# Patient Record
Sex: MALE | Race: WHITE | NOT HISPANIC OR LATINO
[De-identification: names, ages, dates, MRNs, and addresses within clinical notes are randomized per-mention and may not be internally consistent; named-entity substitution may affect disease eponyms.]

---

## 2020-04-06 ENCOUNTER — TRANSCRIPTION ENCOUNTER (OUTPATIENT)
Age: 75
End: 2020-04-06

## 2020-04-10 ENCOUNTER — INPATIENT (INPATIENT)
Facility: HOSPITAL | Age: 75
LOS: 10 days | Discharge: HOME | DRG: 177 | End: 2020-04-21
Attending: INTERNAL MEDICINE | Admitting: INTERNAL MEDICINE
Payer: MEDICARE

## 2020-04-10 VITALS
HEART RATE: 100 BPM | DIASTOLIC BLOOD PRESSURE: 66 MMHG | SYSTOLIC BLOOD PRESSURE: 95 MMHG | WEIGHT: 199.96 LBS | OXYGEN SATURATION: 96 % | RESPIRATION RATE: 20 BRPM

## 2020-04-10 DIAGNOSIS — R68.89 OTHER GENERAL SYMPTOMS AND SIGNS: ICD-10-CM

## 2020-04-10 LAB
ALBUMIN SERPL ELPH-MCNC: 3.6 G/DL — SIGNIFICANT CHANGE UP (ref 3.3–5)
ALP SERPL-CCNC: 40 U/L — SIGNIFICANT CHANGE UP (ref 40–120)
ALT FLD-CCNC: 22 U/L — SIGNIFICANT CHANGE UP (ref 10–45)
ANION GAP SERPL CALC-SCNC: 17 MMOL/L — SIGNIFICANT CHANGE UP (ref 5–17)
APTT BLD: 26.7 SEC — LOW (ref 27.5–36.3)
AST SERPL-CCNC: 46 U/L — HIGH (ref 10–40)
BASE EXCESS BLDV CALC-SCNC: 0.3 MMOL/L — SIGNIFICANT CHANGE UP (ref -2–2)
BASOPHILS # BLD AUTO: 0 K/UL — SIGNIFICANT CHANGE UP (ref 0–0.2)
BASOPHILS NFR BLD AUTO: 0 % — SIGNIFICANT CHANGE UP (ref 0–2)
BILIRUB SERPL-MCNC: 0.9 MG/DL — SIGNIFICANT CHANGE UP (ref 0.2–1.2)
BUN SERPL-MCNC: 26 MG/DL — HIGH (ref 7–23)
CA-I SERPL-SCNC: 0.92 MMOL/L — LOW (ref 1.12–1.3)
CALCIUM SERPL-MCNC: 9 MG/DL — SIGNIFICANT CHANGE UP (ref 8.4–10.5)
CHLORIDE BLDV-SCNC: 97 MMOL/L — SIGNIFICANT CHANGE UP (ref 96–108)
CHLORIDE SERPL-SCNC: 93 MMOL/L — LOW (ref 96–108)
CO2 BLDV-SCNC: 25 MMOL/L — SIGNIFICANT CHANGE UP (ref 22–30)
CO2 SERPL-SCNC: 21 MMOL/L — LOW (ref 22–31)
CREAT SERPL-MCNC: 1.29 MG/DL — SIGNIFICANT CHANGE UP (ref 0.5–1.3)
CRP SERPL-MCNC: 3.95 MG/DL — HIGH (ref 0–0.4)
D DIMER BLD IA.RAPID-MCNC: 500 NG/ML DDU — HIGH
EOSINOPHIL # BLD AUTO: 0 K/UL — SIGNIFICANT CHANGE UP (ref 0–0.5)
EOSINOPHIL NFR BLD AUTO: 0 % — SIGNIFICANT CHANGE UP (ref 0–6)
FERRITIN SERPL-MCNC: 1888 NG/ML — HIGH (ref 30–400)
FIBRINOGEN PPP-MCNC: 750 MG/DL — HIGH (ref 350–510)
GAS PNL BLDV: 123 MMOL/L — LOW (ref 135–145)
GAS PNL BLDV: SIGNIFICANT CHANGE UP
GAS PNL BLDV: SIGNIFICANT CHANGE UP
GLUCOSE BLDV-MCNC: 163 MG/DL — HIGH (ref 70–99)
GLUCOSE SERPL-MCNC: 163 MG/DL — HIGH (ref 70–99)
HCO3 BLDV-SCNC: 24 MMOL/L — SIGNIFICANT CHANGE UP (ref 21–29)
HCT VFR BLD CALC: 48 % — SIGNIFICANT CHANGE UP (ref 39–50)
HCT VFR BLDA CALC: 51 % — HIGH (ref 39–50)
HGB BLD CALC-MCNC: 16.6 G/DL — SIGNIFICANT CHANGE UP (ref 13–17)
HGB BLD-MCNC: 15.8 G/DL — SIGNIFICANT CHANGE UP (ref 13–17)
HOROWITZ INDEX BLDV+IHG-RTO: SIGNIFICANT CHANGE UP
INR BLD: 1.23 RATIO — HIGH (ref 0.88–1.16)
LACTATE BLDV-MCNC: 3.1 MMOL/L — HIGH (ref 0.7–2)
LDH SERPL L TO P-CCNC: 492 U/L — HIGH (ref 50–242)
LYMPHOCYTES # BLD AUTO: 0.36 K/UL — LOW (ref 1–3.3)
LYMPHOCYTES # BLD AUTO: 6.9 % — LOW (ref 13–44)
MANUAL SMEAR VERIFICATION: SIGNIFICANT CHANGE UP
MCHC RBC-ENTMCNC: 28.8 PG — SIGNIFICANT CHANGE UP (ref 27–34)
MCHC RBC-ENTMCNC: 32.9 GM/DL — SIGNIFICANT CHANGE UP (ref 32–36)
MCV RBC AUTO: 87.4 FL — SIGNIFICANT CHANGE UP (ref 80–100)
METAMYELOCYTES # FLD: 0.9 % — HIGH (ref 0–0)
MONOCYTES # BLD AUTO: 0.27 K/UL — SIGNIFICANT CHANGE UP (ref 0–0.9)
MONOCYTES NFR BLD AUTO: 5.2 % — SIGNIFICANT CHANGE UP (ref 2–14)
NEUTROPHILS # BLD AUTO: 4.52 K/UL — SIGNIFICANT CHANGE UP (ref 1.8–7.4)
NEUTROPHILS NFR BLD AUTO: 83.6 % — HIGH (ref 43–77)
NEUTS BAND # BLD: 2.6 % — SIGNIFICANT CHANGE UP (ref 0–8)
PCO2 BLDV: 38 MMHG — SIGNIFICANT CHANGE UP (ref 35–50)
PH BLDV: 7.42 — SIGNIFICANT CHANGE UP (ref 7.35–7.45)
PLAT MORPH BLD: NORMAL — SIGNIFICANT CHANGE UP
PLATELET # BLD AUTO: 84 K/UL — LOW (ref 150–400)
PO2 BLDV: <20 MMHG — LOW (ref 25–45)
POTASSIUM BLDV-SCNC: 10.1 MMOL/L — CRITICAL HIGH (ref 3.5–5.3)
POTASSIUM SERPL-MCNC: 4.4 MMOL/L — SIGNIFICANT CHANGE UP (ref 3.5–5.3)
POTASSIUM SERPL-SCNC: 4.4 MMOL/L — SIGNIFICANT CHANGE UP (ref 3.5–5.3)
PROCALCITONIN SERPL-MCNC: 0.1 NG/ML — SIGNIFICANT CHANGE UP (ref 0.02–0.1)
PROT SERPL-MCNC: 7.2 G/DL — SIGNIFICANT CHANGE UP (ref 6–8.3)
PROTHROM AB SERPL-ACNC: 14.2 SEC — HIGH (ref 10–12.9)
RBC # BLD: 5.49 M/UL — SIGNIFICANT CHANGE UP (ref 4.2–5.8)
RBC # FLD: 14 % — SIGNIFICANT CHANGE UP (ref 10.3–14.5)
RBC BLD AUTO: SIGNIFICANT CHANGE UP
SAO2 % BLDV: 19 % — LOW (ref 67–88)
SARS-COV-2 RNA SPEC QL NAA+PROBE: DETECTED
SODIUM SERPL-SCNC: 131 MMOL/L — LOW (ref 135–145)
VARIANT LYMPHS # BLD: 0.8 % — SIGNIFICANT CHANGE UP (ref 0–6)
WBC # BLD: 5.24 K/UL — SIGNIFICANT CHANGE UP (ref 3.8–10.5)
WBC # FLD AUTO: 5.24 K/UL — SIGNIFICANT CHANGE UP (ref 3.8–10.5)

## 2020-04-10 PROCEDURE — 99221 1ST HOSP IP/OBS SF/LOW 40: CPT | Mod: CS,AI

## 2020-04-10 PROCEDURE — 71045 X-RAY EXAM CHEST 1 VIEW: CPT | Mod: 26

## 2020-04-10 PROCEDURE — 99285 EMERGENCY DEPT VISIT HI MDM: CPT

## 2020-04-10 PROCEDURE — 93010 ELECTROCARDIOGRAM REPORT: CPT

## 2020-04-10 RX ORDER — ACETAMINOPHEN 500 MG
650 TABLET ORAL ONCE
Refills: 0 | Status: COMPLETED | OUTPATIENT
Start: 2020-04-10 | End: 2020-04-10

## 2020-04-10 RX ORDER — SODIUM CHLORIDE 9 MG/ML
1000 INJECTION INTRAMUSCULAR; INTRAVENOUS; SUBCUTANEOUS
Refills: 0 | Status: DISCONTINUED | OUTPATIENT
Start: 2020-04-10 | End: 2020-04-11

## 2020-04-10 RX ORDER — ENOXAPARIN SODIUM 100 MG/ML
40 INJECTION SUBCUTANEOUS
Refills: 0 | Status: DISCONTINUED | OUTPATIENT
Start: 2020-04-10 | End: 2020-04-21

## 2020-04-10 RX ORDER — SIMVASTATIN 20 MG/1
20 TABLET, FILM COATED ORAL AT BEDTIME
Refills: 0 | Status: DISCONTINUED | OUTPATIENT
Start: 2020-04-10 | End: 2020-04-21

## 2020-04-10 RX ORDER — LISINOPRIL 2.5 MG/1
10 TABLET ORAL DAILY
Refills: 0 | Status: DISCONTINUED | OUTPATIENT
Start: 2020-04-10 | End: 2020-04-10

## 2020-04-10 RX ADMIN — SIMVASTATIN 20 MILLIGRAM(S): 20 TABLET, FILM COATED ORAL at 21:02

## 2020-04-10 RX ADMIN — ENOXAPARIN SODIUM 40 MILLIGRAM(S): 100 INJECTION SUBCUTANEOUS at 15:55

## 2020-04-10 RX ADMIN — Medication 40 MILLIGRAM(S): at 15:55

## 2020-04-10 RX ADMIN — Medication 650 MILLIGRAM(S): at 10:50

## 2020-04-10 RX ADMIN — SODIUM CHLORIDE 70 MILLILITER(S): 9 INJECTION INTRAMUSCULAR; INTRAVENOUS; SUBCUTANEOUS at 15:54

## 2020-04-10 NOTE — ED PROVIDER NOTE - PROGRESS NOTE DETAILS
Pt with +SOB at rest. +tachypnea. 02 sat 90% on room air, 96% on 3L 02.  Pt states he feels more comfortable with the oxygen. discussed with Dr. Juraes, will admit pt. -Lizz Vizcarra PA-C

## 2020-04-10 NOTE — H&P ADULT - NSHPPHYSICALEXAM_GEN_ALL_CORE
PHYSICAL EXAMINATION:  Vital Signs Last 24 Hrs  T(C): 37.3 (10 Apr 2020 10:49), Max: 37.3 (10 Apr 2020 10:49)  T(F): 99.1 (10 Apr 2020 10:49), Max: 99.1 (10 Apr 2020 10:49)  HR: 100 (10 Apr 2020 10:49) (100 - 100)  BP: 125/81 (10 Apr 2020 10:49) (95/66 - 125/81)  BP(mean): --  RR: 34 (10 Apr 2020 10:49) (20 - 34)  SpO2: 90% (10 Apr 2020 10:49) (90% - 96%)  CAPILLARY BLOOD GLUCOSE            GENERAL: NAD, well-groomed,  HEAD:  atraumatic, normocephalic  EYES: sclera anicteric  ENMT: mucous membranes moist  NECK: supple, No JVD  CHEST/LUNG: clear to auscultation bilaterally;    no      rales   ,   no rhonchi,   HEART: normal S1, S2  ABDOMEN: BS+, soft, ND, NT   EXTREMITIES:    no    edema    b/l LEs  NEURO: awake, ,     moves all extremities  SKIN: no     rash

## 2020-04-10 NOTE — H&P ADULT - NSHPREVIEWOFSYSTEMS_GEN_ALL_CORE
REVIEW OF SYSTEMS:  GEN: no fever,    no chills  RESP:   SOB,   no cough  CVS: no chest pain,   no palpitations  GI: no abdominal pain,   no nausea,   no vomiting,   no constipation,   no diarrhea  : no dysuria,   no frequency  NEURO: no headache,   no dizziness  PSYCH: no depression,   not anxious  Derm : no rash

## 2020-04-10 NOTE — ED PROVIDER NOTE - ATTENDING CONTRIBUTION TO CARE
73 yo male with a pmh of HLD, HTN, c/o sob worse with exertion, fevers, chills, for the past 4-5 days or now with sats 91% on RA, tachypneic, rales at bases, cxr, labs, ambulatory sat likely admitted. covid order set used. supportive o2 started.

## 2020-04-10 NOTE — H&P ADULT - HISTORY OF PRESENT ILLNESS
73 yo male      with a pmh of   HLD,  HTN,        c/o sob worse with exertion, fevers, chills for 5 days.     denies chest pain.     Pt states he is unable to do minimal activity without getting sob. denies any sick contacts.     quit smoking 15 years ago.

## 2020-04-10 NOTE — ED ADULT NURSE NOTE - OBJECTIVE STATEMENT
Pt is an ambulatory 74 yr old male a/o X 3 c/o shortness of breath for 4-5 days.  Pt lives alone and EMS states as per family he has been disoriented and SOB with fevers.  Pt is A/oX2, perrl wnl, almazan with equal strength.  LUNGS CTA no chest pain or sob.  NSR on cm at 99 bpm.  No chest pain or sob at rest.  No fevers, c/o chills and N/V.  Abdomen NT ND.  No urinary symptoms.  Peripheral pulses +2bl no edema

## 2020-04-10 NOTE — H&P ADULT - ASSESSMENT
75 yo male      with a pmh of   HLD,  HTN,  ex   smoker,  quit  15  yrs          c/o sob worse with exertion, fevers, chills for 5 days. /  IN  eR ,mild  tachycardia.  hypoxia  to 90, on oxygen    no  cp     Pt states he is unable to do minimal activity without getting sob. denies any sick contacts.   pt with normal  wbc/ mild  hyponatremia.  suspect pt has  COVID  Covid, pending   pt  has  elevated  D dimer/ PTT/  LDH/ fibrinogen  ID eval   cxr/ with opacities.  b/l   on lovenox , bid  keep  O2  sat above  93       < from: Xray Chest 1 View-PORTABLE IMMEDIATE (04.10.20 @ 11:17) >  Impression: Vague patchy airspace opacities in the peripheral aspects of the bilateral lungs, concerning for viral pneumonia such as COVID.   < end of copied text > 73 yo male      with a pmh of   HLD,  HTN,  ex   smoker,  quit  15  yrs          c/o sob worse with exertion, fevers, chills for 5 days. /  IN  eR ,mild  tachycardia.  hypoxia  to 90, on oxygen    no  cp     Pt states he is unable to do minimal activity without getting sob. denies any sick contacts.   pt with normal  wbc/ mild  hyponatremia.  suspect pt has  COVID  Covid, pending   pt  has  elevated  D dimer/ PTT/  LDH/ fibrinogen  ID eval   cxr/ with opacities.  b/l   on lovenox , bid/  Wt is  90 kg  keep  O2  sat above  93       < from: Xray Chest 1 View-PORTABLE IMMEDIATE (04.10.20 @ 11:17) >  Impression: Vague patchy airspace opacities in the peripheral aspects of the bilateral lungs, concerning for viral pneumonia such as COVID.   < end of copied text > 73 yo male      with a pmh of   HLD,  HTN,  ex   smoker,  quit  15  yrs          c/o sob worse with exertion, fevers, chills for 5 days. /  IN  eR ,mild  tachycardia.,   hypoxia  to 90, on oxygen   /  sb[p   of 120 no  cp     Pt states he is unable to do minimal activity without getting sob. denies any sick contacts.   pt with normal  wbc/ mild  hyponatremia.  suspect pt has  COVID  Covid, pending   pt  has  elevated  D dimer/ PTT/  LDH/ fibrinogen  ID eval dr Casey   cxr/ with opacities.  b/l   on lovenox , bid/  Wt is  90 kg  keep  O2  sat above  93      sbp  now  in 88  to  90/  stop  lisinopril/  ov fluids       < from: Xray Chest 1 View-PORTABLE IMMEDIATE (04.10.20 @ 11:17) >  Impression: Vague patchy airspace opacities in the peripheral aspects of the bilateral lungs, concerning for viral pneumonia such as COVID.   < end of copied text >

## 2020-04-10 NOTE — H&P ADULT - NSHPLABSRESULTS_GEN_ALL_CORE
LABS:                        15.8   5.24  )-----------( 84       ( 10 Apr 2020 10:57 )             48.0     04-10    131<L>  |  93<L>  |  26<H>  ----------------------------<  163<H>  4.4   |  21<L>  |  1.29    Ca    9.0      10 Apr 2020 10:57    TPro  7.2  /  Alb  3.6  /  TBili  0.9  /  DBili  x   /  AST  46<H>  /  ALT  22  /  AlkPhos  40  04-10    PT/INR - ( 10 Apr 2020 10:57 )   PT: 14.2 sec;   INR: 1.23 ratio         PTT - ( 10 Apr 2020 10:57 )  PTT:26.7 sec            04-10 @ 10:57  10.1  <20

## 2020-04-10 NOTE — ED PROVIDER NOTE - OBJECTIVE STATEMENT
75 yo male with a pmh of HLD, HTN, c/o sob worse with exertion, fevers, chills,     quit smoking 15 years ago 75 yo male with a pmh of HLD, HTN, c/o sob worse with exertion, fevers, chills for 5 days.  denies chest pain. Pt states he is unable to do minimal activity without getting sob. denies any sick contacts.  quit smoking 15 years ago. 73 yo male with a pmh of HLD, HTN, c/o sob worse with exertion, fevers, chills for 5 days.  denies chest pain. Pt states he is unable to do minimal activity without getting sob. denies any sick contacts.  quit smoking 15 years ago.    PMD: Gonzalo Martin 73 yo male with a pmh of HLD, HTN, c/o sob worse with exertion, fevers, chills for 5 days.  denies chest pain. Pt states he is unable to do minimal activity without getting sob. denies any sick contacts.  quit smoking 15 years ago.     PMD: Gonzalo Martin

## 2020-04-10 NOTE — ED PROVIDER NOTE - DISPOSITION TYPE
NSVT   keep electrolytes in normal range  no evidence of MI  obtain echo  agree with BB    meningitis   anbx  fu with ID    HTN  cont current meds ADMIT

## 2020-04-10 NOTE — CONSULT NOTE ADULT - SUBJECTIVE AND OBJECTIVE BOX
HPI:   Patient is a 74y male who has been very weak, lost sense of taste and smell, unable to get out of bed, poor po intake and cough starting about 1 week ago. He does not know if he has fever. He has been very shakey on his feet. No n,v,d,ha,abdo pain, visual change, sore throat. No recent travel, lives alone, does not know of sick contacts. He has pain in the second left toe    REVIEW OF SYSTEMS:  All other review of systems negative (Comprehensive ROS)    PAST MEDICAL & SURGICAL HISTORY:  Hyperlipidemia  Hypertension  No significant past surgical history      Allergies    No Known Allergies    Intolerances        Antimicrobials Day #  :    Other Medications:  enoxaparin Injectable 40 milliGRAM(s) SubCutaneous two times a day  methylPREDNISolone sodium succinate Injectable 40 milliGRAM(s) IV Push two times a day  simvastatin 20 milliGRAM(s) Oral at bedtime  sodium chloride 0.9%. 1000 milliLiter(s) IV Continuous <Continuous>      FAMILY HISTORY:  No pertinent family history in first degree relatives      SOCIAL HISTORY:  Smoking: [ ]Yes [ x]No  ETOH: [ ]Yes x[ ]No  Drug Use: [ ]Yes [ x]No   [ ] Single[ ]     x    T(F): 98.1 (04-10-20 @ 15:40), Max: 99.1 (04-10-20 @ 10:49)  HR: 72 (04-10-20 @ 15:40)  BP: 109/72 (04-10-20 @ 15:40)  RR: 20 (04-10-20 @ 15:40)  SpO2: 93% (04-10-20 @ 15:40)  Wt(kg): --    PHYSICAL EXAM:  General: alert, no acute distress, looks weak  Eyes:  anicteric, no conjunctival injection, no discharge  Oropharynx: no lesions or injection 	  Neck: supple, without adenopathy  Lungs: rales  to auscultation  Heart: regular rate and rhythm; no murmur, rubs or gallops  Abdomen: soft, nondistended, nontender, without mass or organomegaly  Skin: no lesions  Extremities: no clubbing, cyanosis, or edema. left 2nd toe with fungal nail  Neurologic: alert, oriented, moves all extremities, stutters    LAB RESULTS:                        15.8   5.24  )-----------( 84       ( 10 Apr 2020 10:57 )             48.0     04-10    131<L>  |  93<L>  |  26<H>  ----------------------------<  163<H>  4.4   |  21<L>  |  1.29    Ca    9.0      10 Apr 2020 10:57    TPro  7.2  /  Alb  3.6  /  TBili  0.9  /  DBili  x   /  AST  46<H>  /  ALT  22  /  AlkPhos  40  04-10    LIVER FUNCTIONS - ( 10 Apr 2020 10:57 )  Alb: 3.6 g/dL / Pro: 7.2 g/dL / ALK PHOS: 40 U/L / ALT: 22 U/L / AST: 46 U/L / GGT: x           Ferritin, Serum: 1888 ng/mL (04.10.20 @ 13:12)  C-Reactive Protein, Serum: 3.95 mg/dL (04.10.20 @ 13:12)  Procalcitonin, Serum: 0.10:   Lactate Dehydrogenase, Serum: 492 U/L (04.10.20 @ 10:57)  D-Dimer Assay, Quantitative: 500:         MICROBIOLOGY:  RECENT CULTURES:        RADIOLOGY REVIEWED:    < from: Xray Chest 1 View-PORTABLE IMMEDIATE (04.10.20 @ 11:17) >  EXAM:  XR CHEST PORTABLE IMMED 1V                            PROCEDURE DATE:  04/10/2020            INTERPRETATION:  Indication: Shortness of breath, cough, fever.    Technique: Single portable view of the chest.    Comparison: None.    Findings: The cardiac silhouette is normal in size. There are vague patchy airspace opacities in the peripheral aspects of the bilateral lungs, more pronounced in the left lung. There are no pleural effusions. The hilar mediastinal structures appear unremarkable.    Impression: Vague patchy airspace opacities in the peripheral aspects of the bilateral lungs, concerning for viral pneumonia such as COVID.     < end of copied text >        Impression:  patient with sob, hypoxic, elevated inflammatory parameters, cough, infiltrates, rales, very weak and shakey , suspect he has covid but await results of the pcr    Recommendations:  await pcr for covid, if positive which I suspect it will be, start plaquenil 800mg once today then 400mg daily for 4 days. His qtc is less than .5 and will need to be watched  if covid is negative, would repeat and check rvp also and will add antibiotics ctx and doxy but I am very doubtful he has anything other than covid so will not order yet  defer use of steroids to dr. Duvall  dvt prophylaxis, monitor oxygenation

## 2020-04-11 LAB
ANION GAP SERPL CALC-SCNC: 14 MMOL/L — SIGNIFICANT CHANGE UP (ref 5–17)
BUN SERPL-MCNC: 30 MG/DL — HIGH (ref 7–23)
CALCIUM SERPL-MCNC: 8.8 MG/DL — SIGNIFICANT CHANGE UP (ref 8.4–10.5)
CHLORIDE SERPL-SCNC: 99 MMOL/L — SIGNIFICANT CHANGE UP (ref 96–108)
CO2 SERPL-SCNC: 20 MMOL/L — LOW (ref 22–31)
CREAT SERPL-MCNC: 0.99 MG/DL — SIGNIFICANT CHANGE UP (ref 0.5–1.3)
GLUCOSE SERPL-MCNC: 175 MG/DL — HIGH (ref 70–99)
HCT VFR BLD CALC: 44.7 % — SIGNIFICANT CHANGE UP (ref 39–50)
HGB BLD-MCNC: 14.6 G/DL — SIGNIFICANT CHANGE UP (ref 13–17)
MCHC RBC-ENTMCNC: 28.5 PG — SIGNIFICANT CHANGE UP (ref 27–34)
MCHC RBC-ENTMCNC: 32.7 GM/DL — SIGNIFICANT CHANGE UP (ref 32–36)
MCV RBC AUTO: 87.3 FL — SIGNIFICANT CHANGE UP (ref 80–100)
NRBC # BLD: 0 /100 WBCS — SIGNIFICANT CHANGE UP (ref 0–0)
PLATELET # BLD AUTO: 100 K/UL — LOW (ref 150–400)
POTASSIUM SERPL-MCNC: 4.4 MMOL/L — SIGNIFICANT CHANGE UP (ref 3.5–5.3)
POTASSIUM SERPL-SCNC: 4.4 MMOL/L — SIGNIFICANT CHANGE UP (ref 3.5–5.3)
RBC # BLD: 5.12 M/UL — SIGNIFICANT CHANGE UP (ref 4.2–5.8)
RBC # FLD: 14.1 % — SIGNIFICANT CHANGE UP (ref 10.3–14.5)
SODIUM SERPL-SCNC: 133 MMOL/L — LOW (ref 135–145)
WBC # BLD: 5.45 K/UL — SIGNIFICANT CHANGE UP (ref 3.8–10.5)
WBC # FLD AUTO: 5.45 K/UL — SIGNIFICANT CHANGE UP (ref 3.8–10.5)

## 2020-04-11 PROCEDURE — 99232 SBSQ HOSP IP/OBS MODERATE 35: CPT | Mod: CS

## 2020-04-11 RX ORDER — HYDROXYCHLOROQUINE SULFATE 200 MG
800 TABLET ORAL EVERY 24 HOURS
Refills: 0 | Status: COMPLETED | OUTPATIENT
Start: 2020-04-11 | End: 2020-04-11

## 2020-04-11 RX ORDER — ACETAMINOPHEN 500 MG
975 TABLET ORAL ONCE
Refills: 0 | Status: COMPLETED | OUTPATIENT
Start: 2020-04-11 | End: 2020-04-11

## 2020-04-11 RX ORDER — HYDROXYCHLOROQUINE SULFATE 200 MG
400 TABLET ORAL EVERY 24 HOURS
Refills: 0 | Status: COMPLETED | OUTPATIENT
Start: 2020-04-12 | End: 2020-04-15

## 2020-04-11 RX ORDER — HYDROXYCHLOROQUINE SULFATE 200 MG
TABLET ORAL
Refills: 0 | Status: COMPLETED | OUTPATIENT
Start: 2020-04-11 | End: 2020-04-15

## 2020-04-11 RX ADMIN — SIMVASTATIN 20 MILLIGRAM(S): 20 TABLET, FILM COATED ORAL at 22:35

## 2020-04-11 RX ADMIN — ENOXAPARIN SODIUM 40 MILLIGRAM(S): 100 INJECTION SUBCUTANEOUS at 04:29

## 2020-04-11 RX ADMIN — Medication 40 MILLIGRAM(S): at 17:10

## 2020-04-11 RX ADMIN — Medication 40 MILLIGRAM(S): at 04:29

## 2020-04-11 RX ADMIN — Medication 800 MILLIGRAM(S): at 12:00

## 2020-04-11 RX ADMIN — Medication 100 MILLIGRAM(S): at 22:35

## 2020-04-11 RX ADMIN — ENOXAPARIN SODIUM 40 MILLIGRAM(S): 100 INJECTION SUBCUTANEOUS at 17:11

## 2020-04-11 RX ADMIN — Medication 975 MILLIGRAM(S): at 22:35

## 2020-04-11 NOTE — CONSULT NOTE ADULT - SUBJECTIVE AND OBJECTIVE BOX
CHIEF COMPLAINT:Patient is a 74y old  Male who presents with a chief complaint of sob (11 Apr 2020 08:48)      HPI:  75 yo male with a pmh of HLD, HTN, c/o sob worse with exertion, fevers, chills for 5 days.  denies chest pain. Pt states he is unable to do minimal activity without getting sob. denies any sick contacts.  quit smoking 15 years ago.   pt also with hx of increase cholesterol , pt was dx with COVID and started on steroid and Hydroxychloroquine  no chest pain, +NEGRON.    PAST MEDICAL & SURGICAL HISTORY:  Hyperlipidemia  Hypertension  No significant past surgical history      MEDICATIONS  (STANDING):  enoxaparin Injectable 40 milliGRAM(s) SubCutaneous two times a day  hydroxychloroquine   Oral   methylPREDNISolone sodium succinate Injectable 40 milliGRAM(s) IV Push two times a day  simvastatin 20 milliGRAM(s) Oral at bedtime  sodium chloride 0.9%. 1000 milliLiter(s) (70 mL/Hr) IV Continuous <Continuous>    MEDICATIONS  (PRN):  benzonatate 100 milliGRAM(s) Oral three times a day PRN Cough      FAMILY HISTORY:  No pertinent family history in first degree relatives      SOCIAL HISTORY:    [ ] Non-smoker  [ ] Smoker  [ ] Alcohol    Allergies    No Known Allergies    Intolerances    	    REVIEW OF SYSTEMS:  CONSTITUTIONAL: No fever, weight loss, or fatigue  EYES: No eye pain, visual disturbances, or discharge  ENT:  No difficulty hearing, tinnitus, vertigo; No sinus or throat pain  NECK: No pain or stiffness  RESPIRATORY: No cough, wheezing, chills or hemoptysis; + Shortness of Breath  CARDIOVASCULAR: No chest pain, palpitations, passing out, dizziness, or leg swelling  GASTROINTESTINAL: No abdominal or epigastric pain. No nausea, vomiting, or hematemesis; No diarrhea or constipation. No melena or hematochezia.  GENITOURINARY: No dysuria, frequency, hematuria, or incontinence  NEUROLOGICAL: No headaches, memory loss, loss of strength, numbness, or tremors  SKIN: No itching, burning, rashes, or lesions   LYMPH Nodes: No enlarged glands  ENDOCRINE: No heat or cold intolerance; No hair loss  MUSCULOSKELETAL: No joint pain or swelling; No muscle, back, or extremity pain  PSYCHIATRIC: No depression, anxiety, mood swings, or difficulty sleeping  HEME/LYMPH: No easy bruising, or bleeding gums  ALLERGY AND IMMUNOLOGIC: No hives or eczema	    [ ] All others negative	  [ ] Unable to obtain    PHYSICAL EXAM:  T(C): 36.8 (04-11-20 @ 09:16), Max: 37.3 (04-11-20 @ 01:11)  HR: 81 (04-11-20 @ 09:16) (72 - 94)  BP: 117/71 (04-11-20 @ 09:16) (100/71 - 127/77)  RR: 20 (04-11-20 @ 09:16) (20 - 20)  SpO2: 90% (04-11-20 @ 09:16) (90% - 98%)  Wt(kg): --  I&O's Summary    10 Apr 2020 07:01  -  11 Apr 2020 07:00  --------------------------------------------------------  IN: 750 mL / OUT: 0 mL / NET: 750 mL        Appearance: Normal	  HEENT:   Normal oral mucosa, PERRL, EOMI	  Lymphatic: No lymphadenopathy  Cardiovascular: Normal S1 S2, No JVD, + murmurs, No edema  Respiratory: Lungs clear to auscultation	  Psychiatry: A & O x 3, Mood & affect appropriate  Gastrointestinal:  Soft, Non-tender, + BS	  Skin: No rashes, No ecchymoses, No cyanosis	  Neurologic: Non-focal  Extremities: Normal range of motion, No clubbing, cyanosis or edema  Vascular: Peripheral pulses palpable 2+ bilaterally    TELEMETRY: 	    ECG:  	  RADIOLOGY:  OTHER: 	  	  LABS:	 	    CARDIAC MARKERS:                              14.6   5.45  )-----------( 100      ( 11 Apr 2020 07:08 )             44.7     04-11    133<L>  |  99  |  30<H>  ----------------------------<  175<H>  4.4   |  20<L>  |  0.99    Ca    8.8      11 Apr 2020 07:08    TPro  7.2  /  Alb  3.6  /  TBili  0.9  /  DBili  x   /  AST  46<H>  /  ALT  22  /  AlkPhos  40  04-10    proBNP:   Lipid Profile:   HgA1c:   TSH:   PT/INR - ( 10 Apr 2020 10:57 )   PT: 14.2 sec;   INR: 1.23 ratio         PTT - ( 10 Apr 2020 10:57 )  PTT:26.7 sec    PREVIOUS DIAGNOSTIC TESTING:    < from: 12 Lead ECG (04.10.20 @ 11:58) >  Ventricular Rate 93 BPM    Atrial Rate 93 BPM    P-R Interval 164 ms    QRS Duration 80 ms    Q-T Interval 386 ms    QTC Calculation(Bezet) 479 ms    P Axis 59 degrees    R Axis -62 degrees    T Axis 55 degrees    Diagnosis Line SINUS RHYTHM WITH PREMATURE ATRIAL COMPLEXES  POSSIBLE LEFT ATRIAL ENLARGEMENT  LEFT AXIS DEVIATION  ABNORMAL ECG    < from: Xray Chest 1 View-PORTABLE IMMEDIATE (04.10.20 @ 11:17) >  Findings: The cardiac silhouette is normal in size. There are vague patchy airspace opacities in the peripheral aspects of the bilateral lungs, more pronounced in the left lung. There are no pleural effusions. The hilar mediastinal structures appear unremarkable.    Impression: Vague patchy airspace opacities in the peripheral aspects of the bilateral lungs, concerning for viral pneumonia such as COVID.         await pcr for covid, if positive which I suspect it will be, start plaquenil 800mg once today then 400mg daily for 4 days. His qtc is less than .5 and will need to be watched  if covid is negative, would repeat and check rvp also and will add antibiotics ctx and doxy but I am very doubtful he has anything other than covid so will not order yet  defer use of steroids to dr. Duvall  dvt prophylaxis, monitor oxygenation

## 2020-04-11 NOTE — PROGRESS NOTE ADULT - ASSESSMENT
73 yo male      with a pmh of   HLD,  HTN,  ex   smoker,  quit  15  yrs          c/o sob worse with exertion, fevers, chills for 5 days. /  IN  eR ,mild  tachycardia.,   hypoxia  to 90, on oxygen   /  sb[p   of 120 no  cp     Pt states he is unable to do minimal activity without getting sob. denies any sick contacts.   pt with normal  wbc/ mild  hyponatremia.  suspect pt has  COVID  Covid, Positive, on PLAQUENIL,  IV STEROID   pt  has  elevated  D dimer/ PTT/  LDH/ fibrinogen  ID eval dr Casey   cxr/ with opacities.  b/l   on lovenox , bid/  Wt is  90 kg  keep  O2  sat above  93  bp  meds  stopped/  sbp  has improved       < from: Xray Chest 1 View-PORTABLE IMMEDIATE (04.10.20 @ 11:17) >  Impression: Vague patchy airspace opacities in the peripheral aspects of the bilateral lungs, concerning for viral pneumonia such as COVID.   < end of copied text >

## 2020-04-11 NOTE — CONSULT NOTE ADULT - ASSESSMENT
75 yo male with a pmh of HLD, HTN, c/o sob worse with exertion, fevers, chills for 5 days.  denies chest pain. Pt states he is unable to do minimal activity without getting sob. denies any sick contacts.  quit smoking 15 years ago.   pt also with hx of increase cholesterol , pt was dx with COVID and started on steroid and Hydroxychloroquine  no chest pain, +NEGRON.  pt with covid + agree with steroid and Hydroxychloroquine  will repeat ecg today qtc 479 pruior to initiation  hold all bp meds  dc ivf  agree with change Lovenox to BID  will decide on anakinra if worsened  continue Lovenox 40 mg bid  check and fu markers

## 2020-04-11 NOTE — PROGRESS NOTE ADULT - SUBJECTIVE AND OBJECTIVE BOX
weak/  mils  tachypnea    REVIEW OF SYSTEMS:  GEN: no fever,    no chills  RESP: no SOB,   no cough  CVS: no chest pain,   no palpitations  GI: no abdominal pain,   no nausea,   no vomiting,   no constipation,   no diarrhea  : no dysuria,   no frequency  NEURO: no headache,   no dizziness  PSYCH: no depression,   not anxious  Derm : no rash    MEDICATIONS  (STANDING):  enoxaparin Injectable 40 milliGRAM(s) SubCutaneous two times a day  hydroxychloroquine 800 milliGRAM(s) Oral every 24 hours  hydroxychloroquine   Oral   methylPREDNISolone sodium succinate Injectable 40 milliGRAM(s) IV Push two times a day  simvastatin 20 milliGRAM(s) Oral at bedtime  sodium chloride 0.9%. 1000 milliLiter(s) (70 mL/Hr) IV Continuous <Continuous>    MEDICATIONS  (PRN):      Vital Signs Last 24 Hrs  T(C): 36.7 (11 Apr 2020 04:32), Max: 37.3 (10 Apr 2020 10:49)  T(F): 98 (11 Apr 2020 04:32), Max: 99.2 (11 Apr 2020 01:11)  HR: 80 (11 Apr 2020 04:32) (72 - 100)  BP: 120/76 (11 Apr 2020 04:32) (95/66 - 127/77)  BP(mean): --  RR: 20 (11 Apr 2020 04:32) (20 - 34)  SpO2: 92% (11 Apr 2020 04:32) (90% - 98%)  CAPILLARY BLOOD GLUCOSE        I&O's Summary    10 Apr 2020 07:01  -  11 Apr 2020 07:00  --------------------------------------------------------  IN: 750 mL / OUT: 0 mL / NET: 750 mL        PHYSICAL EXAM:  HEAD:  Atraumatic, Normocephalic  NECK: Supple, No   JVD  CHEST/LUNG:   no     rales,     no,    rhonchi  HEART: Regular rate and rhythm;         murmur  ABDOMEN: Soft, Nontender, ;   EXTREMITIES:    no    edema  NEUROLOGY:  alert    LABS:                        14.6   5.45  )-----------( 100      ( 11 Apr 2020 07:08 )             44.7     04-11    133<L>  |  99  |  30<H>  ----------------------------<  175<H>  4.4   |  20<L>  |  0.99    Ca    8.8      11 Apr 2020 07:08    TPro  7.2  /  Alb  3.6  /  TBili  0.9  /  DBili  x   /  AST  46<H>  /  ALT  22  /  AlkPhos  40  04-10    PT/INR - ( 10 Apr 2020 10:57 )   PT: 14.2 sec;   INR: 1.23 ratio         PTT - ( 10 Apr 2020 10:57 )  PTT:26.7 sec            04-10 @ 10:57  10.1  <20              Consultant(s) Notes Reviewed:      Care Discussed with Consultants/Other Providers:

## 2020-04-12 PROCEDURE — 99232 SBSQ HOSP IP/OBS MODERATE 35: CPT | Mod: CS

## 2020-04-12 RX ORDER — LANOLIN ALCOHOL/MO/W.PET/CERES
3 CREAM (GRAM) TOPICAL ONCE
Refills: 0 | Status: COMPLETED | OUTPATIENT
Start: 2020-04-12 | End: 2020-04-12

## 2020-04-12 RX ADMIN — Medication 40 MILLIGRAM(S): at 17:49

## 2020-04-12 RX ADMIN — Medication 400 MILLIGRAM(S): at 09:02

## 2020-04-12 RX ADMIN — Medication 100 MILLIGRAM(S): at 05:16

## 2020-04-12 RX ADMIN — ENOXAPARIN SODIUM 40 MILLIGRAM(S): 100 INJECTION SUBCUTANEOUS at 17:49

## 2020-04-12 RX ADMIN — ENOXAPARIN SODIUM 40 MILLIGRAM(S): 100 INJECTION SUBCUTANEOUS at 05:16

## 2020-04-12 RX ADMIN — SIMVASTATIN 20 MILLIGRAM(S): 20 TABLET, FILM COATED ORAL at 21:36

## 2020-04-12 RX ADMIN — Medication 100 MILLIGRAM(S): at 21:36

## 2020-04-12 RX ADMIN — Medication 3 MILLIGRAM(S): at 21:36

## 2020-04-12 RX ADMIN — Medication 40 MILLIGRAM(S): at 05:16

## 2020-04-12 NOTE — PROGRESS NOTE ADULT - SUBJECTIVE AND OBJECTIVE BOX
CC: f/u for covid    Patient reports feels better    REVIEW OF SYSTEMS:  All other review of systems negative (Comprehensive ROS)    Antimicrobials Day #  :3/5  hydroxychloroquine   Oral   hydroxychloroquine 400 milliGRAM(s) Oral every 24 hours    Other Medications Reviewed    T(F): 97.6 (04-12-20 @ 21:05), Max: 98 (04-12-20 @ 04:40)  HR: 76 (04-12-20 @ 21:05)  BP: 137/86 (04-12-20 @ 21:05)  RR: 20 (04-12-20 @ 21:05)  SpO2: 89% (04-12-20 @ 21:05)  Wt(kg): --    PHYSICAL EXAM:  General: alert, no acute distress  Eyes:  anicteric, no conjunctival injection, no discharge  Oropharynx: no lesions or injection 	  Neck: supple, without adenopathy  Lungs: rales to auscultation  Heart: regular rate and rhythm; no murmur, rubs or gallops  Abdomen: soft, nondistended, nontender, without mass or organomegaly  Skin: no lesions  Extremities: no clubbing, cyanosis, or edema  Neurologic: alert, stronger  moves all extremities    LAB RESULTS:                        14.6   5.45  )-----------( 100      ( 11 Apr 2020 07:08 )             44.7     04-11    133<L>  |  99  |  30<H>  ----------------------------<  175<H>  4.4   |  20<L>  |  0.99    Ca    8.8      11 Apr 2020 07:08      C-Reactive Protein, Serum: 3.95 mg/dL (04.10.20 @ 13:12)  Ferritin, Serum: 1888 ng/mL (04.10.20 @ 13:12)  Lactate Dehydrogenase, Serum: 492 U/L (04.10.20 @ 10:57)  D-Dimer Assay, Quantitative: 500: D-Dimer result less than 230 ng/mL DDU correlates with the absence  of thrombosis in a patient with a low and moderate       pre-test probability of thrombosis.  1 DDU is approximately equal to  2 ng/mL FEU (previous units). ng/mL DDU (04.10.20 @ 10:57)        MICROBIOLOGY:  RECENT CULTURES:      RADIOLOGY REVIEWED:        < from: Xray Chest 1 View-PORTABLE IMMEDIATE (04.10.20 @ 11:17) >    EXAM:  XR CHEST PORTABLE IMMED 1V                            PROCEDURE DATE:  04/10/2020            INTERPRETATION:  Indication: Shortness of breath, cough, fever.    Technique: Single portable view of the chest.    Comparison: None.    Findings: The cardiac silhouette is normal in size. There are vague patchy airspace opacities in the peripheral aspects of the bilateral lungs, more pronounced in the left lung. There are no pleural effusions. The hilar mediastinal structures appear unremarkable.    Impression: Vague patchy airspace opacities in the peripheral aspects of the bilateral lungs, concerning for viral pneumonia such as COVID.     < end of copied text >        Assessment:  patient t with sob, hypoxic, elevated inflammatory parameters, cough, infiltrates, rales, very weak and shakey , found to have covid, on plaquenil , feels better, requires nasal cannula  Plan:  2 more days of plaquenil  monitor inflammatory parameters  dvt prophylaxis CC: f/u for covid    Patient reports feels better    REVIEW OF SYSTEMS:  All other review of systems negative (Comprehensive ROS)    Antimicrobials Day #  :2/5  hydroxychloroquine   Oral   hydroxychloroquine 400 milliGRAM(s) Oral every 24 hours    Other Medications Reviewed    T(F): 97.6 (04-12-20 @ 21:05), Max: 98 (04-12-20 @ 04:40)  HR: 76 (04-12-20 @ 21:05)  BP: 137/86 (04-12-20 @ 21:05)  RR: 20 (04-12-20 @ 21:05)  SpO2: 89% (04-12-20 @ 21:05)  Wt(kg): --    PHYSICAL EXAM:  General: alert, no acute distress  Eyes:  anicteric, no conjunctival injection, no discharge  Oropharynx: no lesions or injection 	  Neck: supple, without adenopathy  Lungs: rales to auscultation  Heart: regular rate and rhythm; no murmur, rubs or gallops  Abdomen: soft, nondistended, nontender, without mass or organomegaly  Skin: no lesions  Extremities: no clubbing, cyanosis, or edema  Neurologic: alert, stronger  moves all extremities    LAB RESULTS:                        14.6   5.45  )-----------( 100      ( 11 Apr 2020 07:08 )             44.7     04-11    133<L>  |  99  |  30<H>  ----------------------------<  175<H>  4.4   |  20<L>  |  0.99    Ca    8.8      11 Apr 2020 07:08      C-Reactive Protein, Serum: 3.95 mg/dL (04.10.20 @ 13:12)  Ferritin, Serum: 1888 ng/mL (04.10.20 @ 13:12)  Lactate Dehydrogenase, Serum: 492 U/L (04.10.20 @ 10:57)  D-Dimer Assay, Quantitative: 500: D-Dimer result less than 230 ng/mL DDU correlates with the absence  of thrombosis in a patient with a low and moderate       pre-test probability of thrombosis.  1 DDU is approximately equal to  2 ng/mL FEU (previous units). ng/mL DDU (04.10.20 @ 10:57)        MICROBIOLOGY:  RECENT CULTURES:      RADIOLOGY REVIEWED:        < from: Xray Chest 1 View-PORTABLE IMMEDIATE (04.10.20 @ 11:17) >    EXAM:  XR CHEST PORTABLE IMMED 1V                            PROCEDURE DATE:  04/10/2020            INTERPRETATION:  Indication: Shortness of breath, cough, fever.    Technique: Single portable view of the chest.    Comparison: None.    Findings: The cardiac silhouette is normal in size. There are vague patchy airspace opacities in the peripheral aspects of the bilateral lungs, more pronounced in the left lung. There are no pleural effusions. The hilar mediastinal structures appear unremarkable.    Impression: Vague patchy airspace opacities in the peripheral aspects of the bilateral lungs, concerning for viral pneumonia such as COVID.     < end of copied text >        Assessment:  patient t with sob, hypoxic, elevated inflammatory parameters, cough, infiltrates, rales, very weak and shakey , found to have covid, on plaquenil , feels better, requires nasal cannula  Plan:  3 more days of plaquenil  monitor inflammatory parameters  dvt prophylaxis

## 2020-04-12 NOTE — PROGRESS NOTE ADULT - SUBJECTIVE AND OBJECTIVE BOX
still  sob    REVIEW OF SYSTEMS:  GEN: no fever,    no chills  RESP: SOB,   no cough  CVS: no chest pain,   no palpitations  GI: no abdominal pain,   no nausea,   no vomiting,   no constipation,   no diarrhea  : no dysuria,   no frequency  NEURO: no headache,   no dizziness  PSYCH: no depression,   not anxious  Derm : no rash    MEDICATIONS  (STANDING):  enoxaparin Injectable 40 milliGRAM(s) SubCutaneous two times a day  hydroxychloroquine   Oral   hydroxychloroquine 400 milliGRAM(s) Oral every 24 hours  methylPREDNISolone sodium succinate Injectable 40 milliGRAM(s) IV Push two times a day  simvastatin 20 milliGRAM(s) Oral at bedtime    MEDICATIONS  (PRN):  benzonatate 100 milliGRAM(s) Oral three times a day PRN Cough      Vital Signs Last 24 Hrs  T(C): 36.7 (12 Apr 2020 04:40), Max: 36.8 (11 Apr 2020 09:16)  T(F): 98 (12 Apr 2020 04:40), Max: 98.2 (11 Apr 2020 09:16)  HR: 67 (12 Apr 2020 04:40) (67 - 81)  BP: 131/80 (12 Apr 2020 04:40) (109/67 - 137/83)  BP(mean): --  RR: 20 (12 Apr 2020 04:40) (20 - 20)  SpO2: 90% (12 Apr 2020 04:40) (88% - 90%)  CAPILLARY BLOOD GLUCOSE        I&O's Summary    11 Apr 2020 07:01  -  12 Apr 2020 07:00  --------------------------------------------------------  IN: 900 mL / OUT: 2050 mL / NET: -1150 mL        PHYSICAL EXAM:  HEAD:  Atraumatic, Normocephalic  NECK: Supple, No   JVD  CHEST/LUNG:   no     rales,     no,    rhonchi  HEART: Regular rate and rhythm;         murmur  ABDOMEN: Soft, Nontender, ;   EXTREMITIES:        edema  NEUROLOGY:  alert    LABS:                        14.6   5.45  )-----------( 100      ( 11 Apr 2020 07:08 )             44.7     04-11    133<L>  |  99  |  30<H>  ----------------------------<  175<H>  4.4   |  20<L>  |  0.99    Ca    8.8      11 Apr 2020 07:08    TPro  7.2  /  Alb  3.6  /  TBili  0.9  /  DBili  x   /  AST  46<H>  /  ALT  22  /  AlkPhos  40  04-10    PT/INR - ( 10 Apr 2020 10:57 )   PT: 14.2 sec;   INR: 1.23 ratio         PTT - ( 10 Apr 2020 10:57 )  PTT:26.7 sec            04-10 @ 10:57  10.1  <20              Consultant(s) Notes Reviewed:      Care Discussed with Consultants/Other Providers:

## 2020-04-12 NOTE — PROGRESS NOTE ADULT - SUBJECTIVE AND OBJECTIVE BOX
CARDIOLOGY     PROGRESS  NOTE   ________________________________________________    CHIEF COMPLAINT:Patient is a 74y old  Male who presents with a chief complaint of sob (12 Apr 2020 07:02)  no complain.  	  REVIEW OF SYSTEMS:  CONSTITUTIONAL: No fever, weight loss, or fatigue  EYES: No eye pain, visual disturbances, or discharge  ENT:  No difficulty hearing, tinnitus, vertigo; No sinus or throat pain  NECK: No pain or stiffness  RESPIRATORY: No cough, wheezing, chills or hemoptysis; No Shortness of Breath  CARDIOVASCULAR: No chest pain, palpitations, passing out, dizziness, or leg swelling  GASTROINTESTINAL: No abdominal or epigastric pain. No nausea, vomiting, or hematemesis; No diarrhea or constipation. No melena or hematochezia.  GENITOURINARY: No dysuria, frequency, hematuria, or incontinence  NEUROLOGICAL: No headaches, memory loss, loss of strength, numbness, or tremors  SKIN: No itching, burning, rashes, or lesions   LYMPH Nodes: No enlarged glands  ENDOCRINE: No heat or cold intolerance; No hair loss  MUSCULOSKELETAL: No joint pain or swelling; No muscle, back, or extremity pain  PSYCHIATRIC: No depression, anxiety, mood swings, or difficulty sleeping  HEME/LYMPH: No easy bruising, or bleeding gums  ALLERGY AND IMMUNOLOGIC: No hives or eczema	    [ ] All others negative	  [ ] Unable to obtain    PHYSICAL EXAM:  T(C): 36 (04-12-20 @ 08:15), Max: 36.7 (04-12-20 @ 04:40)  HR: 72 (04-12-20 @ 08:15) (67 - 79)  BP: 129/78 (04-12-20 @ 08:15) (109/67 - 137/83)  RR: 22 (04-12-20 @ 08:15) (20 - 22)  SpO2: 94% (04-12-20 @ 08:15) (88% - 94%)  Wt(kg): --  I&O's Summary    11 Apr 2020 07:01  -  12 Apr 2020 07:00  --------------------------------------------------------  IN: 900 mL / OUT: 2050 mL / NET: -1150 mL    12 Apr 2020 07:01  -  12 Apr 2020 11:04  --------------------------------------------------------  IN: 360 mL / OUT: 0 mL / NET: 360 mL        Appearance: Normal	  HEENT:   Normal oral mucosa, PERRL, EOMI	  Lymphatic: No lymphadenopathy  Cardiovascular: Normal S1 S2, No JVD, + murmurs, No edema  Respiratory: clear  Psychiatry: A & O x 3, Mood & affect appropriate  Gastrointestinal:  Soft, Non-tender, + BS	  Skin: No rashes, No ecchymoses, No cyanosis	  Neurologic: Non-focal  Extremities: Normal range of motion, No clubbing, cyanosis or edema  Vascular: Peripheral pulses palpable 2+ bilaterally    MEDICATIONS  (STANDING):  enoxaparin Injectable 40 milliGRAM(s) SubCutaneous two times a day  hydroxychloroquine   Oral   hydroxychloroquine 400 milliGRAM(s) Oral every 24 hours  methylPREDNISolone sodium succinate Injectable 40 milliGRAM(s) IV Push two times a day  simvastatin 20 milliGRAM(s) Oral at bedtime      TELEMETRY: 	    ECG:  	  RADIOLOGY:  OTHER: 	  	  LABS:	 	    CARDIAC MARKERS:                                14.6   5.45  )-----------( 100      ( 11 Apr 2020 07:08 )             44.7     04-11    133<L>  |  99  |  30<H>  ----------------------------<  175<H>  4.4   |  20<L>  |  0.99    Ca    8.8      11 Apr 2020 07:08      proBNP:   Lipid Profile:   HgA1c:   TSH:         Assessment and plan  ---------------------------  73 yo male with a pmh of HLD, HTN, c/o sob worse with exertion, fevers, chills for 5 days.  denies chest pain. Pt states he is unable to do minimal activity without getting sob. denies any sick contacts.  quit smoking 15 years ago.   pt also with hx of increase cholesterol , pt was dx with COVID and started on steroid and Hydroxychloroquine  no chest pain, +NEGRON.  pt with covid + agree with steroid and Hydroxychloroquine, doing better on o2   will add Anakinra if worsened  check markers  will repeat ecg today qtc 479 pruior to initiation  hold all bp meds  dc ivf  agree with change Lovenox to BID  will decide on anakinra if worsened  continue Lovenox 40 mg bid  check and fu markers

## 2020-04-12 NOTE — PROGRESS NOTE ADULT - ASSESSMENT
73 yo male      with a pmh of   HLD,  HTN,  ex   smoker,  quit  15  yrs          c/o sob worse with exertion, fevers, chills for 5 days. /  IN  eR ,mild  tachycardia.,   hypoxia  to 90, on oxygen   /  sb[p   of 120 no  cp     Pt states he is unable to do minimal activity without getting sob. denies any sick contacts.   pt with normal  wbc/ mild  hyponatremia.    Covid, Positive, on PLAQUENIL,  IV STEROID   pt  has  elevated  D dimer/ PTT/  LDH/ fibrinogen  ID eval dr Casey   cxr/ with opacities.  b/l   on lovenox , bid/  Wt is  90 kg  keep  O2  sat above  93  bp  meds  stopped/  sbp  has improved  continue  supportive care       < from: Xray Chest 1 View-PORTABLE IMMEDIATE (04.10.20 @ 11:17) >  Impression: Vague patchy airspace opacities in the peripheral aspects of the bilateral lungs, concerning for viral pneumonia such as COVID.   < end of copied text >

## 2020-04-13 LAB
ALBUMIN SERPL ELPH-MCNC: 3.7 G/DL — SIGNIFICANT CHANGE UP (ref 3.3–5)
ALP SERPL-CCNC: 48 U/L — SIGNIFICANT CHANGE UP (ref 40–120)
ALT FLD-CCNC: 23 U/L — SIGNIFICANT CHANGE UP (ref 10–45)
ANION GAP SERPL CALC-SCNC: 17 MMOL/L — SIGNIFICANT CHANGE UP (ref 5–17)
AST SERPL-CCNC: 41 U/L — HIGH (ref 10–40)
BILIRUB SERPL-MCNC: 0.7 MG/DL — SIGNIFICANT CHANGE UP (ref 0.2–1.2)
BUN SERPL-MCNC: 35 MG/DL — HIGH (ref 7–23)
CALCIUM SERPL-MCNC: 9.3 MG/DL — SIGNIFICANT CHANGE UP (ref 8.4–10.5)
CHLORIDE SERPL-SCNC: 98 MMOL/L — SIGNIFICANT CHANGE UP (ref 96–108)
CO2 SERPL-SCNC: 21 MMOL/L — LOW (ref 22–31)
CREAT SERPL-MCNC: 1.06 MG/DL — SIGNIFICANT CHANGE UP (ref 0.5–1.3)
CRP SERPL-MCNC: 0.79 MG/DL — HIGH (ref 0–0.4)
D DIMER BLD IA.RAPID-MCNC: 255 NG/ML DDU — HIGH
FERRITIN SERPL-MCNC: 1268 NG/ML — HIGH (ref 30–400)
GLUCOSE SERPL-MCNC: 154 MG/DL — HIGH (ref 70–99)
HCT VFR BLD CALC: 48.5 % — SIGNIFICANT CHANGE UP (ref 39–50)
HGB BLD-MCNC: 15.3 G/DL — SIGNIFICANT CHANGE UP (ref 13–17)
LDH SERPL L TO P-CCNC: 498 U/L — HIGH (ref 50–242)
MCHC RBC-ENTMCNC: 28.1 PG — SIGNIFICANT CHANGE UP (ref 27–34)
MCHC RBC-ENTMCNC: 31.5 GM/DL — LOW (ref 32–36)
MCV RBC AUTO: 89.2 FL — SIGNIFICANT CHANGE UP (ref 80–100)
NRBC # BLD: 0 /100 WBCS — SIGNIFICANT CHANGE UP (ref 0–0)
PLATELET # BLD AUTO: 163 K/UL — SIGNIFICANT CHANGE UP (ref 150–400)
POTASSIUM SERPL-MCNC: 4.5 MMOL/L — SIGNIFICANT CHANGE UP (ref 3.5–5.3)
POTASSIUM SERPL-SCNC: 4.5 MMOL/L — SIGNIFICANT CHANGE UP (ref 3.5–5.3)
PROCALCITONIN SERPL-MCNC: 0.05 NG/ML — SIGNIFICANT CHANGE UP (ref 0.02–0.1)
PROT SERPL-MCNC: 7.4 G/DL — SIGNIFICANT CHANGE UP (ref 6–8.3)
RBC # BLD: 5.44 M/UL — SIGNIFICANT CHANGE UP (ref 4.2–5.8)
RBC # FLD: 14.1 % — SIGNIFICANT CHANGE UP (ref 10.3–14.5)
SODIUM SERPL-SCNC: 136 MMOL/L — SIGNIFICANT CHANGE UP (ref 135–145)
WBC # BLD: 8.76 K/UL — SIGNIFICANT CHANGE UP (ref 3.8–10.5)
WBC # FLD AUTO: 8.76 K/UL — SIGNIFICANT CHANGE UP (ref 3.8–10.5)

## 2020-04-13 PROCEDURE — 99232 SBSQ HOSP IP/OBS MODERATE 35: CPT | Mod: CS

## 2020-04-13 RX ADMIN — Medication 100 MILLIGRAM(S): at 04:03

## 2020-04-13 RX ADMIN — SIMVASTATIN 20 MILLIGRAM(S): 20 TABLET, FILM COATED ORAL at 22:52

## 2020-04-13 RX ADMIN — ENOXAPARIN SODIUM 40 MILLIGRAM(S): 100 INJECTION SUBCUTANEOUS at 16:57

## 2020-04-13 RX ADMIN — Medication 40 MILLIGRAM(S): at 04:03

## 2020-04-13 RX ADMIN — Medication 400 MILLIGRAM(S): at 08:05

## 2020-04-13 RX ADMIN — ENOXAPARIN SODIUM 40 MILLIGRAM(S): 100 INJECTION SUBCUTANEOUS at 04:02

## 2020-04-13 NOTE — PROGRESS NOTE ADULT - SUBJECTIVE AND OBJECTIVE BOX
CC: f/u for COVID-19     Patient reports that he continues to have a continues cough     REVIEW OF SYSTEMS:  All other review of systems negative (Comprehensive ROS)    Antimicrobials Day #      Other Medications Reviewed    Vital Signs Last 24 Hrs  T(C): 36.8 (13 Apr 2020 08:46), Max: 36.8 (13 Apr 2020 08:46)  T(F): 98.2 (13 Apr 2020 08:46), Max: 98.2 (13 Apr 2020 08:46)  HR: 79 (13 Apr 2020 08:46) (71 - 79)  BP: 148/89 (13 Apr 2020 08:46) (129/77 - 148/89)  BP(mean): --  RR: 20 (13 Apr 2020 08:46) (20 - 20)  SpO2: 90% (13 Apr 2020 08:46) (89% - 96%)    PHYSICAL EXAM:  General: alert, no acute distress  Eyes:  anicteric, no conjunctival injection, no discharge  Oropharynx: no lesions or injection 	  Lungs: rales to auscultation  Heart: regular rate and rhythm; no murmur, rubs or gallops  Abdomen: soft, nondistended, nontender  Skin: no lesions  Extremities: no clubbing, cyanosis, or edema  Neurologic: alert,moves all extremities    LAB RESULTS:                        15.3   8.76  )-----------( 163      ( 13 Apr 2020 06:20 )             48.5                           14.6   5.45  )-----------( 100      ( 11 Apr 2020 07:08 )             44.7     04-11    133<L>  |  99  |  30<H>  ----------------------------<  175<H>  4.4   |  20<L>  |  0.99    Ca    8.8      11 Apr 2020 07:08      C-Reactive Protein, Serum: 3.95 mg/dL (04.10.20 @ 13:12)  Ferritin, Serum: 1888 ng/mL (04.10.20 @ 13:12)  Lactate Dehydrogenase, Serum: 492 U/L (04.10.20 @ 10:57)  D-Dimer Assay, Quantitative: 500: D-Dimer result less than 230 ng/mL DDU correlates with the absence  of thrombosis in a patient with a low and moderate       pre-test probability of thrombosis.  1 DDU is approximately equal to  2 ng/mL FEU (previous units). ng/mL DDU (04.10.20 @ 10:57)        MICROBIOLOGY:  RECENT CULTURES:      RADIOLOGY REVIEWED:        < from: Xray Chest 1 View-PORTABLE IMMEDIATE (04.10.20 @ 11:17) >    EXAM:  XR CHEST PORTABLE IMMED 1V                            PROCEDURE DATE:  04/10/2020            INTERPRETATION:  Indication: Shortness of breath, cough, fever.    Technique: Single portable view of the chest.    Comparison: None.    Findings: The cardiac silhouette is normal in size. There are vague patchy airspace opacities in the peripheral aspects of the bilateral lungs, more pronounced in the left lung. There are no pleural effusions. The hilar mediastinal structures appear unremarkable.    Impression: Vague patchy airspace opacities in the peripheral aspects of the bilateral lungs, concerning for viral pneumonia such as COVID.     < end of copied text >

## 2020-04-13 NOTE — PROGRESS NOTE ADULT - ASSESSMENT
75 yo male      with a pmh of   HLD,  HTN,  ex   smoker,  quit  15  yrs          c/o sob worse with exertion, fevers, chills for 5 days. /  IN  eR ,mild  tachycardia.,   hypoxia  to 90, on oxygen   /  sb[p   of 120 no  cp     Pt states he is unable to do minimal activity without getting sob. denies any sick contacts.   pt with normal  wbc/ mild  hyponatremia.    Covid, Positive, on PLAQUENIL,   s/p   IV STEROID   pt  has  elevated  D dimer/ PTT/  LDH/ fibrinogen  ID eval dr Casey   cxr/ with opacities.  b/l   on lovenox , bid/  Wt is  90 kg  keep  O2  sat above  93  bp  meds  stopped/  sbp  has improved  continue  supportive care/  hypoxia. on oxygen       < from: Xray Chest 1 View-PORTABLE IMMEDIATE (04.10.20 @ 11:17) >  Impression: Vague patchy airspace opacities in the peripheral aspects of the bilateral lungs, concerning for viral pneumonia such as COVID.   < end of copied text >

## 2020-04-13 NOTE — PROGRESS NOTE ADULT - SUBJECTIVE AND OBJECTIVE BOX
hypoxic    REVIEW OF SYSTEMS:  GEN: no fever,    no chills  RESP: no SOB,   no cough  CVS: no chest pain,   no palpitations  GI: no abdominal pain,   no nausea,   no vomiting,   no constipation,   no diarrhea  : no dysuria,   no frequency  NEURO: no headache,   no dizziness  PSYCH: no depression,   not anxious  Derm : no rash    MEDICATIONS  (STANDING):  enoxaparin Injectable 40 milliGRAM(s) SubCutaneous two times a day  hydroxychloroquine   Oral   hydroxychloroquine 400 milliGRAM(s) Oral every 24 hours  simvastatin 20 milliGRAM(s) Oral at bedtime    MEDICATIONS  (PRN):  benzonatate 100 milliGRAM(s) Oral three times a day PRN Cough      Vital Signs Last 24 Hrs  T(C): 36.8 (13 Apr 2020 08:46), Max: 36.8 (13 Apr 2020 08:46)  T(F): 98.2 (13 Apr 2020 08:46), Max: 98.2 (13 Apr 2020 08:46)  HR: 79 (13 Apr 2020 08:46) (71 - 79)  BP: 148/89 (13 Apr 2020 08:46) (129/77 - 148/89)  BP(mean): --  RR: 20 (13 Apr 2020 08:46) (20 - 20)  SpO2: 90% (13 Apr 2020 08:46) (89% - 96%)  CAPILLARY BLOOD GLUCOSE        I&O's Summary    12 Apr 2020 07:01  -  13 Apr 2020 07:00  --------------------------------------------------------  IN: 1120 mL / OUT: 1100 mL / NET: 20 mL        PHYSICAL EXAM:  HEAD:  Atraumatic, Normocephalic  NECK: Supple, No   JVD  CHEST/LUNG:   no     rales,     no,    rhonchi  HEART: Regular rate and rhythm;         murmur  ABDOMEN: Soft, Nontender, ;   EXTREMITIES:    no    edema  NEUROLOGY:  alert    LABS:                        15.3   8.76  )-----------( 163      ( 13 Apr 2020 06:20 )             48.5     04-13    136  |  98  |  35<H>  ----------------------------<  154<H>  4.5   |  21<L>  |  1.06    Ca    9.3      13 Apr 2020 06:20    TPro  7.4  /  Alb  3.7  /  TBili  0.7  /  DBili  x   /  AST  41<H>  /  ALT  23  /  AlkPhos  48  04-13                            Consultant(s) Notes Reviewed:      Care Discussed with Consultants/Other Providers:

## 2020-04-13 NOTE — PROGRESS NOTE ADULT - SUBJECTIVE AND OBJECTIVE BOX
CARDIOLOGY     PROGRESS  NOTE   ________________________________________________    CHIEF COMPLAINT:Patient is a 74y old  Male who presents with a chief complaint of sob (13 Apr 2020 10:46)  no complain.  	  REVIEW OF SYSTEMS:  CONSTITUTIONAL: No fever, weight loss, or fatigue  EYES: No eye pain, visual disturbances, or discharge  ENT:  No difficulty hearing, tinnitus, vertigo; No sinus or throat pain  NECK: No pain or stiffness  RESPIRATORY: No cough, wheezing, chills or hemoptysis; No Shortness of Breath  CARDIOVASCULAR: No chest pain, palpitations, passing out, dizziness, or leg swelling  GASTROINTESTINAL: No abdominal or epigastric pain. No nausea, vomiting, or hematemesis; No diarrhea or constipation. No melena or hematochezia.  GENITOURINARY: No dysuria, frequency, hematuria, or incontinence  NEUROLOGICAL: No headaches, memory loss, loss of strength, numbness, or tremors  SKIN: No itching, burning, rashes, or lesions   LYMPH Nodes: No enlarged glands  ENDOCRINE: No heat or cold intolerance; No hair loss  MUSCULOSKELETAL: No joint pain or swelling; No muscle, back, or extremity pain  PSYCHIATRIC: No depression, anxiety, mood swings, or difficulty sleeping  HEME/LYMPH: No easy bruising, or bleeding gums  ALLERGY AND IMMUNOLOGIC: No hives or eczema	    [ ] All others negative	  [ ] Unable to obtain    PHYSICAL EXAM:  T(C): 36.8 (04-13-20 @ 08:46), Max: 36.8 (04-13-20 @ 08:46)  HR: 79 (04-13-20 @ 08:46) (71 - 79)  BP: 148/89 (04-13-20 @ 08:46) (129/77 - 148/89)  RR: 20 (04-13-20 @ 08:46) (20 - 20)  SpO2: 90% (04-13-20 @ 08:46) (89% - 96%)  Wt(kg): --  I&O's Summary    12 Apr 2020 07:01  -  13 Apr 2020 07:00  --------------------------------------------------------  IN: 1120 mL / OUT: 1100 mL / NET: 20 mL    13 Apr 2020 07:01  -  13 Apr 2020 12:03  --------------------------------------------------------  IN: 240 mL / OUT: 0 mL / NET: 240 mL        Appearance: Normal	  HEENT:   Normal oral mucosa, PERRL, EOMI	  Lymphatic: No lymphadenopathy  Cardiovascular: Normal S1 S2, No JVD, + murmurs, No edema  Respiratory: Lungs clear to auscultation	  Psychiatry: A & O x 3, Mood & affect appropriate  Gastrointestinal:  Soft, Non-tender, + BS	  Skin: No rashes, No ecchymoses, No cyanosis	  Neurologic: Non-focal  Extremities: Normal range of motion, No clubbing, cyanosis or edema  Vascular: Peripheral pulses palpable 2+ bilaterally    MEDICATIONS  (STANDING):  enoxaparin Injectable 40 milliGRAM(s) SubCutaneous two times a day  hydroxychloroquine   Oral   hydroxychloroquine 400 milliGRAM(s) Oral every 24 hours  simvastatin 20 milliGRAM(s) Oral at bedtime      TELEMETRY: 	    ECG:  	  RADIOLOGY:  OTHER: 	  	  LABS:	 	    CARDIAC MARKERS:                                15.3   8.76  )-----------( 163      ( 13 Apr 2020 06:20 )             48.5     04-13    136  |  98  |  35<H>  ----------------------------<  154<H>  4.5   |  21<L>  |  1.06    Ca    9.3      13 Apr 2020 06:20    TPro  7.4  /  Alb  3.7  /  TBili  0.7  /  DBili  x   /  AST  41<H>  /  ALT  23  /  AlkPhos  48  04-13    proBNP:   Lipid Profile:   HgA1c:   TSH:   Ferritin, Serum in AM (04.13.20 @ 08:22)    Ferritin, Serum: 1268 ng/mL  < from: 12 Lead ECG (04.10.20 @ 11:58) >  Ventricular Rate 93 BPM    Atrial Rate 93 BPM    P-R Interval 164 ms    QRS Duration 80 ms    Q-T Interval 386 ms    QTC Calculation(Bezet) 479 ms    P Axis 59 degrees    R Axis -62 degrees    T Axis 55 degrees    Diagnosis Line SINUS RHYTHM WITH PREMATURE ATRIAL COMPLEXES  POSSIBLE LEFT ATRIAL ENLARGEMENT  LEFT AXIS DEVIATION  ABNORMAL ECG    < end of copied text >          Assessment and plan  ---------------------------  75 yo male with a pmh of HLD, HTN, c/o sob worse with exertion, fevers, chills for 5 days.  denies chest pain. Pt states he is unable to do minimal activity without getting sob. denies any sick contacts.  quit smoking 15 years ago.   pt also with hx of increase cholesterol , pt was dx with COVID and started on steroid and Hydroxychloroquine  no chest pain, +NEGRON.  pt with covid + agree with steroid and Hydroxychloroquine, doing better on o2   will add Anakinra if worsened  check markers  will repeat ecg today qtc 479 pruior to initiation  hold all bp meds  dc ivf  agree with change Lovenox to BID  will decide on anakinra if worsened  continue Lovenox 40 mg bid  check and fu markers  o2 sat 90 on RA, may add steroid

## 2020-04-13 NOTE — PROGRESS NOTE ADULT - ASSESSMENT
Assessment:  74 year old male who was diagnosed with COVID-19 viral pneumonia   He presents with cough, shortness of breath and hypoxia   Flow sheets reviewed and he is afebrile   wbc are wnl   He continues to require oxgen as per flow sheets he was on 1L of oxygen and he was 93%     Plan:  Continue Plaquenil as planned he is scheduled to complete medication on 4/15  Plaquenil is not an FDA approved drug to directly treat  COVID-19, but is being used  in the [place] and other countries to treat COVID-19 based on in-vitro and anecdotal data. Studies for are ongoing for its use.   Continue supportive care as per primary care team and consultants ie, prn oxygen, prn fluids and prn meds for fever

## 2020-04-14 LAB
ALBUMIN SERPL ELPH-MCNC: 3 G/DL — LOW (ref 3.3–5)
ALP SERPL-CCNC: 46 U/L — SIGNIFICANT CHANGE UP (ref 40–120)
ALT FLD-CCNC: 57 U/L — HIGH (ref 10–45)
ANION GAP SERPL CALC-SCNC: 15 MMOL/L — SIGNIFICANT CHANGE UP (ref 5–17)
AST SERPL-CCNC: 86 U/L — HIGH (ref 10–40)
BILIRUB SERPL-MCNC: 0.7 MG/DL — SIGNIFICANT CHANGE UP (ref 0.2–1.2)
BUN SERPL-MCNC: 26 MG/DL — HIGH (ref 7–23)
CALCIUM SERPL-MCNC: 8.5 MG/DL — SIGNIFICANT CHANGE UP (ref 8.4–10.5)
CHLORIDE SERPL-SCNC: 103 MMOL/L — SIGNIFICANT CHANGE UP (ref 96–108)
CO2 SERPL-SCNC: 18 MMOL/L — LOW (ref 22–31)
CREAT SERPL-MCNC: 1 MG/DL — SIGNIFICANT CHANGE UP (ref 0.5–1.3)
CRP SERPL-MCNC: 2.45 MG/DL — HIGH (ref 0–0.4)
D DIMER BLD IA.RAPID-MCNC: 303 NG/ML DDU — HIGH
GLUCOSE SERPL-MCNC: 132 MG/DL — HIGH (ref 70–99)
POTASSIUM SERPL-MCNC: 4.4 MMOL/L — SIGNIFICANT CHANGE UP (ref 3.5–5.3)
POTASSIUM SERPL-SCNC: 4.4 MMOL/L — SIGNIFICANT CHANGE UP (ref 3.5–5.3)
PROCALCITONIN SERPL-MCNC: 0.07 NG/ML — SIGNIFICANT CHANGE UP (ref 0.02–0.1)
PROT SERPL-MCNC: 6.3 G/DL — SIGNIFICANT CHANGE UP (ref 6–8.3)
SODIUM SERPL-SCNC: 136 MMOL/L — SIGNIFICANT CHANGE UP (ref 135–145)

## 2020-04-14 PROCEDURE — 99232 SBSQ HOSP IP/OBS MODERATE 35: CPT | Mod: CS

## 2020-04-14 RX ORDER — ACETAMINOPHEN 500 MG
650 TABLET ORAL EVERY 6 HOURS
Refills: 0 | Status: DISCONTINUED | OUTPATIENT
Start: 2020-04-14 | End: 2020-04-21

## 2020-04-14 RX ADMIN — Medication 650 MILLIGRAM(S): at 08:40

## 2020-04-14 RX ADMIN — ENOXAPARIN SODIUM 40 MILLIGRAM(S): 100 INJECTION SUBCUTANEOUS at 05:42

## 2020-04-14 RX ADMIN — Medication 40 MILLIGRAM(S): at 16:55

## 2020-04-14 RX ADMIN — ENOXAPARIN SODIUM 40 MILLIGRAM(S): 100 INJECTION SUBCUTANEOUS at 16:55

## 2020-04-14 RX ADMIN — Medication 400 MILLIGRAM(S): at 08:39

## 2020-04-14 RX ADMIN — SIMVASTATIN 20 MILLIGRAM(S): 20 TABLET, FILM COATED ORAL at 20:11

## 2020-04-14 NOTE — PROGRESS NOTE ADULT - SUBJECTIVE AND OBJECTIVE BOX
CARDIOLOGY     PROGRESS  NOTE   ________________________________________________    CHIEF COMPLAINT:Patient is a 74y old  Male who presents with a chief complaint of sob (14 Apr 2020 06:32)  no complain.  	  REVIEW OF SYSTEMS:  CONSTITUTIONAL: No fever, weight loss, or fatigue  EYES: No eye pain, visual disturbances, or discharge  ENT:  No difficulty hearing, tinnitus, vertigo; No sinus or throat pain  NECK: No pain or stiffness  RESPIRATORY: No cough, wheezing, chills or hemoptysis; + Shortness of Breath  CARDIOVASCULAR: No chest pain, palpitations, passing out, dizziness, or leg swelling  GASTROINTESTINAL: No abdominal or epigastric pain. No nausea, vomiting, or hematemesis; No diarrhea or constipation. No melena or hematochezia.  GENITOURINARY: No dysuria, frequency, hematuria, or incontinence  NEUROLOGICAL: No headaches, memory loss, loss of strength, numbness, or tremors  SKIN: No itching, burning, rashes, or lesions   LYMPH Nodes: No enlarged glands  ENDOCRINE: No heat or cold intolerance; No hair loss  MUSCULOSKELETAL: No joint pain or swelling; No muscle, back, or extremity pain  PSYCHIATRIC: No depression, anxiety, mood swings, or difficulty sleeping  HEME/LYMPH: No easy bruising, or bleeding gums  ALLERGY AND IMMUNOLOGIC: No hives or eczema	    [ ] All others negative	  [ ] Unable to obtain    PHYSICAL EXAM:  T(C): 36.8 (04-14-20 @ 10:53), Max: 37.2 (04-14-20 @ 05:39)  HR: 51 (04-14-20 @ 10:53) (51 - 87)  BP: 138/67 (04-14-20 @ 10:53) (138/67 - 156/92)  RR: 20 (04-14-20 @ 10:53) (20 - 28)  SpO2: 95% (04-14-20 @ 10:53) (89% - 95%)  Wt(kg): --  I&O's Summary    13 Apr 2020 07:01  -  14 Apr 2020 07:00  --------------------------------------------------------  IN: 360 mL / OUT: 0 mL / NET: 360 mL    14 Apr 2020 07:01  -  14 Apr 2020 11:40  --------------------------------------------------------  IN: 180 mL / OUT: 200 mL / NET: -20 mL        Appearance: Normal	  HEENT:   Normal oral mucosa, PERRL, EOMI	  Lymphatic: No lymphadenopathy  Cardiovascular: Normal S1 S2, No JVD, + murmurs, No edema  Respiratory: Lungs clear to auscultation	  Psychiatry: A & O x 3, Mood & affect appropriate  Gastrointestinal:  Soft, Non-tender, + BS	  Skin: No rashes, No ecchymoses, No cyanosis	  Neurologic: Non-focal  Extremities: Normal range of motion, No clubbing, cyanosis or edema  Vascular: Peripheral pulses palpable 2+ bilaterally    MEDICATIONS  (STANDING):  enoxaparin Injectable 40 milliGRAM(s) SubCutaneous two times a day  hydroxychloroquine   Oral   hydroxychloroquine 400 milliGRAM(s) Oral every 24 hours  simvastatin 20 milliGRAM(s) Oral at bedtime      TELEMETRY: 	    ECG:  	  RADIOLOGY:  OTHER: 	  	  LABS:	 	    CARDIAC MARKERS:                                15.3   8.76  )-----------( 163      ( 13 Apr 2020 06:20 )             48.5     04-14    136  |  103  |  26<H>  ----------------------------<  132<H>  4.4   |  18<L>  |  1.00    Ca    8.5      14 Apr 2020 07:07    TPro  6.3  /  Alb  3.0<L>  /  TBili  0.7  /  DBili  x   /  AST  86<H>  /  ALT  57<H>  /  AlkPhos  46  04-14    proBNP:   Lipid Profile:   HgA1c:   TSH:   Continue Plaquenil as planned he is scheduled to complete medication on 4/15  Plaquenil is not an FDA approved drug to directly treat  COVID-19, but is being used  in the [place] and other countries to treat COVID-19 based on in-vitro and anecdotal data. Studies for are ongoing for its use.   Continue supportive care as per primary care team and consultants ie, prn oxygen, prn fluids and prn meds for fever       Assessment and plan  ---------------------------  75 yo male with a pmh of HLD, HTN, c/o sob worse with exertion, fevers, chills for 5 days.  denies chest pain. Pt states he is unable to do minimal activity without getting sob. denies any sick contacts.  quit smoking 15 years ago.   pt also with hx of increase cholesterol , pt was dx with COVID and started on steroid and Hydroxychloroquine  no chest pain, +NEGRON.  pt with covid + agree with steroid and Hydroxychloroquine, doing better on o2   will add Anakinra if worsened  check markers  will repeat ecg today qtc 479 pruior to initiation  hold all bp meds  dc ivf  agree with change Lovenox to BID  will decide on anakinra if worsened  continue Lovenox 40 mg bid  check and fu markers  o2 sat 90 on RA, may add steroid for 3 days

## 2020-04-14 NOTE — PROGRESS NOTE ADULT - SUBJECTIVE AND OBJECTIVE BOX
CC: f/u for COVID-19     Patient is resting in bed, he continues to require oxygen support on nasal cannula 8L    REVIEW OF SYSTEMS:  All other review of systems negative (Comprehensive ROS)    Antimicrobials Day #      Other Medications Reviewed    Vital Signs Last 24 Hrs  T(C): 36.3 (14 Apr 2020 14:21), Max: 37.2 (14 Apr 2020 05:39)  T(F): 97.3 (14 Apr 2020 14:21), Max: 98.9 (14 Apr 2020 05:39)  HR: 72 (14 Apr 2020 14:21) (51 - 87)  BP: 148/86 (14 Apr 2020 14:21) (138/67 - 156/92)  BP(mean): --  RR: 20 (14 Apr 2020 14:21) (20 - 28)  SpO2: 93% (14 Apr 2020 14:21) (91% - 95%)    PHYSICAL EXAM:  General: alert, no acute distress  Eyes:  anicteric, no conjunctival injection, no discharge  Oropharynx: no lesions or injection 	  Lungs: rales to auscultation  Heart: regular rate and rhythm; no murmur, rubs or gallops  Abdomen: soft, nondistended, nontender  Skin: no lesions  Extremities: no clubbing, cyanosis, or edema  Neurologic: alert,moves all extremities    LAB RESULTS:                        15.3   8.76  )-----------( 163      ( 13 Apr 2020 06:20 )             48.5              04-14    136  |  103  |  26<H>  ----------------------------<  132<H>  4.4   |  18<L>  |  1.00    Ca    8.5      14 Apr 2020 07:07    TPro  6.3  /  Alb  3.0<L>  /  TBili  0.7  /  DBili  x   /  AST  86<H>  /  ALT  57<H>  /  AlkPhos  46  04-14      C-Reactive Protein, Serum: 3.95 mg/dL (04.10.20 @ 13:12)  Ferritin, Serum: 1888 ng/mL (04.10.20 @ 13:12)  Lactate Dehydrogenase, Serum: 492 U/L (04.10.20 @ 10:57)  D-Dimer Assay, Quantitative: 500: D-Dimer result less than 230 ng/mL DDU correlates with the absence  of thrombosis in a patient with a low and moderate       pre-test probability of thrombosis.  1 DDU is approximately equal to  2 ng/mL FEU (previous units). ng/mL DDU (04.10.20 @ 10:57)        MICROBIOLOGY:  RECENT CULTURES:      RADIOLOGY REVIEWED:        < from: Xray Chest 1 View-PORTABLE IMMEDIATE (04.10.20 @ 11:17) >    EXAM:  XR CHEST PORTABLE IMMED 1V                            PROCEDURE DATE:  04/10/2020            INTERPRETATION:  Indication: Shortness of breath, cough, fever.    Technique: Single portable view of the chest.    Comparison: None.    Findings: The cardiac silhouette is normal in size. There are vague patchy airspace opacities in the peripheral aspects of the bilateral lungs, more pronounced in the left lung. There are no pleural effusions. The hilar mediastinal structures appear unremarkable.    Impression: Vague patchy airspace opacities in the peripheral aspects of the bilateral lungs, concerning for viral pneumonia such as COVID.     < end of copied text >

## 2020-04-14 NOTE — PROGRESS NOTE ADULT - SUBJECTIVE AND OBJECTIVE BOX
hypoxia/  less  sob    REVIEW OF SYSTEMS:  GEN: no fever,    no chills  RESP: no SOB,   no cough  CVS: no chest pain,   no palpitations  GI: no abdominal pain,   no nausea,   no vomiting,   no constipation,   no diarrhea  : no dysuria,   no frequency  NEURO: no headache,   no dizziness  PSYCH: no depression,   not anxious  Derm : no rash    MEDICATIONS  (STANDING):  enoxaparin Injectable 40 milliGRAM(s) SubCutaneous two times a day  hydroxychloroquine   Oral   hydroxychloroquine 400 milliGRAM(s) Oral every 24 hours  simvastatin 20 milliGRAM(s) Oral at bedtime    MEDICATIONS  (PRN):  benzonatate 100 milliGRAM(s) Oral three times a day PRN Cough      Vital Signs Last 24 Hrs  T(C): 37.2 (14 Apr 2020 05:39), Max: 37.2 (14 Apr 2020 05:39)  T(F): 98.9 (14 Apr 2020 05:39), Max: 98.9 (14 Apr 2020 05:39)  HR: 87 (14 Apr 2020 05:39) (71 - 87)  BP: 156/92 (14 Apr 2020 05:39) (138/81 - 156/92)  BP(mean): --  RR: 20 (14 Apr 2020 06:10) (20 - 28)  SpO2: 94% (14 Apr 2020 06:10) (89% - 94%)  CAPILLARY BLOOD GLUCOSE        I&O's Summary    12 Apr 2020 07:01  -  13 Apr 2020 07:00  --------------------------------------------------------  IN: 1120 mL / OUT: 1100 mL / NET: 20 mL    13 Apr 2020 07:01  -  14 Apr 2020 06:33  --------------------------------------------------------  IN: 360 mL / OUT: 0 mL / NET: 360 mL        PHYSICAL EXAM:  HEAD:  Atraumatic, Normocephalic  NECK: Supple, No   JVD  CHEST/LUNG:   no     rales,     no,    rhonchi  HEART: Regular rate and rhythm;         murmur  ABDOMEN: Soft, Nontender, ;   EXTREMITIES:   no     edema  NEUROLOGY:  alert    LABS:                        15.3   8.76  )-----------( 163      ( 13 Apr 2020 06:20 )             48.5     04-13    136  |  98  |  35<H>  ----------------------------<  154<H>  4.5   |  21<L>  |  1.06    Ca    9.3      13 Apr 2020 06:20    TPro  7.4  /  Alb  3.7  /  TBili  0.7  /  DBili  x   /  AST  41<H>  /  ALT  23  /  AlkPhos  48  04-13                            Consultant(s) Notes Reviewed:      Care Discussed with Consultants/Other Providers:

## 2020-04-14 NOTE — CHART NOTE - NSCHARTNOTEFT_GEN_A_CORE
Nutrition Initial Assessment    Nutrition Consult Received: Yes [   ]  No [x]    Reason for Initial Nutrition Assessment: length of stay assessment    Source of Information: Unable to conduct a face to face interview due to limited contact restrictions related to pt's medical condition and isolation precautions. Unable to reach patient via phone x multiple attempts. No emergency contact phone number in chart. Information obtained from RN and from the EMR.    Admitting Diagnosis: 74y Male admitted for Suspected 2019 novel coronavirus infection  COVID-19+.     PAST MEDICAL & SURGICAL HISTORY:  Hyperlipidemia  Hypertension  No significant past surgical history    Subjective Information: RN reports pt with poor appetite and intake. Denies pt with GI distress. No difficulties chewing or swallowing reported. NKFA. Unable to obtain nutrition history PTA at this time. RD remains available.     Current Nutrition Order: Regular    Skin Integrity: no pressure injuries per flowsheets   Edema: no edema per flowsheets     Labs: 04-14 Na136 mmol/L Glu 132 mg/dL<H> K+ 4.4 mmol/L Cr  1.00 mg/dL BUN 26 mg/dL<H> Phos n/a   Alb 3.0 g/dL<L> PAB n/a       Medications:  MEDICATIONS  (STANDING):  enoxaparin Injectable 40 milliGRAM(s) SubCutaneous two times a day  hydroxychloroquine   Oral   hydroxychloroquine 400 milliGRAM(s) Oral every 24 hours  methylPREDNISolone sodium succinate Injectable 40 milliGRAM(s) IV Push two times a day  simvastatin 20 milliGRAM(s) Oral at bedtime    MEDICATIONS  (PRN):  acetaminophen   Tablet .. 650 milliGRAM(s) Oral every 6 hours PRN Mild Pain (1 - 3), Moderate Pain (4 - 6)  benzonatate 100 milliGRAM(s) Oral three times a day PRN Cough    Ht: 73 inches (185.4 cm) Wt: 199.5 pounds (90.7 kg)  BMI: 26.4 kg/m2  IBW: 184 pounds +/-10% %IBW: 108%    Nutrition Focused Physical Exam: Unable to complete due to limited isolation contact precautions at this time.     Estimated Energy Needs (25 kcal/kg- 30 kcal/kg): 7664-3928 kcal/day  Estimated Protein Needs (1.0 g/kg- 1.2 g/kg):  g/day  Based on weight of: 90.7 kg    [x] Nutrition Diagnosis: Inadequate protein-energy intake related to decreased appetite in setting of acute illness (COVID-19) as evidenced by report of pt with suboptimal intake.   [  ] No active nutrition diagnosis at this time  [  ] Current medical condition precludes nutrition intervention    Goal: Pt to meet >75% of estimated nutritional needs during hospital stay.     Nutrition Interventions:   Recommendations:  1) Continue current diet: Regular - monitor need for DASH/TLC restrictions in setting of hx of HLD, HTN, adjust if PO intake improves  2) RD to provide Mighty Shake 2x/day (200 kcal, 6 gm protein in each) to optimize intake   3) Suggest multivitamin supplementation as medically feasible to optimize nutrient intake   4) RD to continue to obtain/honor food preferences as feasible, RD remains available     Monitor PO intake, weight, labs, skin, GI status, diet   RD to follow-up per protocol.  aYkelin Redd, MS, RD, CDN Pager #[phone]

## 2020-04-14 NOTE — PROGRESS NOTE ADULT - ASSESSMENT
Assessment:  74 year old male who was diagnosed with COVID-19 viral pneumonia   He presents with cough, shortness of breath and hypoxia   Flow sheets reviewed and he is afebrile   wbc are wnl   He continues to require oxgen as per flow sheets he was on 8L of oxygen    Plan:  Continue Plaquenil as planned he is scheduled to complete medication on 4/15  Continue supportive care as per primary care team and consultants ie, prn oxygen, prn fluids and prn meds for fever

## 2020-04-14 NOTE — PROGRESS NOTE ADULT - ASSESSMENT
75 yo male      with a pmh of   HLD,  HTN,  ex   smoker,  quit  15  yrs          c/o sob worse with exertion, fevers, chills for 5 days. /  IN  eR ,mild  tachycardia.,   hypoxia  to 90, on oxygen   /  sb[p   of 120 no  cp     Pt states he is unable to do minimal activity without getting sob. denies any sick contacts.   pt with normal  wbc/ mild  hyponatremia.    Covid, Positive, on PLAQUENIL,   s/p   IV STEROID   pt  has  elevated  D dimer/ PTT/  LDH/ fibrinogen  ID eval dr Casey   cxr/ with opacities.  b/l   on lovenox , bid/  Wt is  90 kg  keep  O2  sat above  93  bp  meds  stopped/  sbp  has improved  continue  supportive care/  hypoxia. on oxygen/ on  6 liters/  laquenil  to  be  completed  on 4/ 15       < from: Xray Chest 1 View-PORTABLE IMMEDIATE (04.10.20 @ 11:17) >  Impression: Vague patchy airspace opacities in the peripheral aspects of the bilateral lungs, concerning for viral pneumonia such as COVID.   < end of copied text >

## 2020-04-15 LAB
ALBUMIN SERPL ELPH-MCNC: 3.1 G/DL — LOW (ref 3.3–5)
ALP SERPL-CCNC: 44 U/L — SIGNIFICANT CHANGE UP (ref 40–120)
ALT FLD-CCNC: 58 U/L — HIGH (ref 10–45)
ANION GAP SERPL CALC-SCNC: 15 MMOL/L — SIGNIFICANT CHANGE UP (ref 5–17)
AST SERPL-CCNC: 53 U/L — HIGH (ref 10–40)
BILIRUB SERPL-MCNC: 0.9 MG/DL — SIGNIFICANT CHANGE UP (ref 0.2–1.2)
BUN SERPL-MCNC: 22 MG/DL — SIGNIFICANT CHANGE UP (ref 7–23)
CALCIUM SERPL-MCNC: 9 MG/DL — SIGNIFICANT CHANGE UP (ref 8.4–10.5)
CHLORIDE SERPL-SCNC: 102 MMOL/L — SIGNIFICANT CHANGE UP (ref 96–108)
CO2 SERPL-SCNC: 19 MMOL/L — LOW (ref 22–31)
CREAT SERPL-MCNC: 0.87 MG/DL — SIGNIFICANT CHANGE UP (ref 0.5–1.3)
GLUCOSE BLDC GLUCOMTR-MCNC: 198 MG/DL — HIGH (ref 70–99)
GLUCOSE SERPL-MCNC: 148 MG/DL — HIGH (ref 70–99)
HCT VFR BLD CALC: 42.6 % — SIGNIFICANT CHANGE UP (ref 39–50)
HGB BLD-MCNC: 13.6 G/DL — SIGNIFICANT CHANGE UP (ref 13–17)
MCHC RBC-ENTMCNC: 28.2 PG — SIGNIFICANT CHANGE UP (ref 27–34)
MCHC RBC-ENTMCNC: 31.9 GM/DL — LOW (ref 32–36)
MCV RBC AUTO: 88.2 FL — SIGNIFICANT CHANGE UP (ref 80–100)
NRBC # BLD: 0 /100 WBCS — SIGNIFICANT CHANGE UP (ref 0–0)
PLATELET # BLD AUTO: 163 K/UL — SIGNIFICANT CHANGE UP (ref 150–400)
POTASSIUM SERPL-MCNC: 4.7 MMOL/L — SIGNIFICANT CHANGE UP (ref 3.5–5.3)
POTASSIUM SERPL-SCNC: 4.7 MMOL/L — SIGNIFICANT CHANGE UP (ref 3.5–5.3)
PROT SERPL-MCNC: 6.6 G/DL — SIGNIFICANT CHANGE UP (ref 6–8.3)
RBC # BLD: 4.83 M/UL — SIGNIFICANT CHANGE UP (ref 4.2–5.8)
RBC # FLD: 14.4 % — SIGNIFICANT CHANGE UP (ref 10.3–14.5)
SODIUM SERPL-SCNC: 136 MMOL/L — SIGNIFICANT CHANGE UP (ref 135–145)
WBC # BLD: 5.59 K/UL — SIGNIFICANT CHANGE UP (ref 3.8–10.5)
WBC # FLD AUTO: 5.59 K/UL — SIGNIFICANT CHANGE UP (ref 3.8–10.5)

## 2020-04-15 PROCEDURE — 99232 SBSQ HOSP IP/OBS MODERATE 35: CPT | Mod: CS

## 2020-04-15 RX ORDER — LISINOPRIL 2.5 MG/1
5 TABLET ORAL DAILY
Refills: 0 | Status: DISCONTINUED | OUTPATIENT
Start: 2020-04-15 | End: 2020-04-16

## 2020-04-15 RX ADMIN — ENOXAPARIN SODIUM 40 MILLIGRAM(S): 100 INJECTION SUBCUTANEOUS at 16:51

## 2020-04-15 RX ADMIN — Medication 40 MILLIGRAM(S): at 16:50

## 2020-04-15 RX ADMIN — Medication 40 MILLIGRAM(S): at 04:55

## 2020-04-15 RX ADMIN — Medication 400 MILLIGRAM(S): at 07:56

## 2020-04-15 RX ADMIN — LISINOPRIL 5 MILLIGRAM(S): 2.5 TABLET ORAL at 11:05

## 2020-04-15 RX ADMIN — SIMVASTATIN 20 MILLIGRAM(S): 20 TABLET, FILM COATED ORAL at 22:39

## 2020-04-15 RX ADMIN — ENOXAPARIN SODIUM 40 MILLIGRAM(S): 100 INJECTION SUBCUTANEOUS at 04:55

## 2020-04-15 NOTE — PROGRESS NOTE ADULT - SUBJECTIVE AND OBJECTIVE BOX
hypoxic    REVIEW OF SYSTEMS:  GEN: no fever,    no chills  RESP: no SOB,   no cough  CVS: no chest pain,   no palpitations  GI: no abdominal pain,   no nausea,   no vomiting,   no constipation,   no diarrhea  : no dysuria,   no frequency  NEURO: no headache,   no dizziness  PSYCH: no depression,   not anxious  Derm : no rash    MEDICATIONS  (STANDING):  enoxaparin Injectable 40 milliGRAM(s) SubCutaneous two times a day  hydroxychloroquine   Oral   hydroxychloroquine 400 milliGRAM(s) Oral every 24 hours  methylPREDNISolone sodium succinate Injectable 40 milliGRAM(s) IV Push two times a day  simvastatin 20 milliGRAM(s) Oral at bedtime    MEDICATIONS  (PRN):  acetaminophen   Tablet .. 650 milliGRAM(s) Oral every 6 hours PRN Mild Pain (1 - 3), Moderate Pain (4 - 6)  benzonatate 100 milliGRAM(s) Oral three times a day PRN Cough      Vital Signs Last 24 Hrs  T(C): 36.5 (15 Apr 2020 04:58), Max: 37.1 (14 Apr 2020 17:34)  T(F): 97.7 (15 Apr 2020 04:58), Max: 98.8 (14 Apr 2020 17:34)  HR: 70 (15 Apr 2020 04:58) (51 - 85)  BP: 147/87 (15 Apr 2020 04:58) (138/67 - 160/70)  BP(mean): --  RR: 20 (15 Apr 2020 04:58) (20 - 22)  SpO2: 97% (15 Apr 2020 04:58) (93% - 97%)  CAPILLARY BLOOD GLUCOSE        I&O's Summary    14 Apr 2020 07:01  -  15 Apr 2020 07:00  --------------------------------------------------------  IN: 550 mL / OUT: 1300 mL / NET: -750 mL        PHYSICAL EXAM:  HEAD:  Atraumatic, Normocephalic  NECK: Supple, No   JVD  CHEST/LUNG:   no     rales,     no,    rhonchi  HEART: Regular rate and rhythm;         murmur  ABDOMEN: Soft, Nontender, ;   EXTREMITIES:    no    edema  NEUROLOGY:  alert    LABS:    04-14    136  |  103  |  26<H>  ----------------------------<  132<H>  4.4   |  18<L>  |  1.00    Ca    8.5      14 Apr 2020 07:07    TPro  6.3  /  Alb  3.0<L>  /  TBili  0.7  /  DBili  x   /  AST  86<H>  /  ALT  57<H>  /  AlkPhos  46  04-14                            Consultant(s) Notes Reviewed:      Care Discussed with Consultants/Other Providers:

## 2020-04-15 NOTE — PROGRESS NOTE ADULT - SUBJECTIVE AND OBJECTIVE BOX
CC: f/u for  covid  Patient reports feels better    REVIEW OF SYSTEMS:  All other review of systems negative (Comprehensive ROS)    Antimicrobials Day #  :    Other Medications Reviewed    T(F): 98.5 (04-15-20 @ 12:40), Max: 98.8 (04-14-20 @ 17:34)  HR: 75 (04-15-20 @ 12:40)  BP: 122/75 (04-15-20 @ 12:40)  RR: 20 (04-15-20 @ 04:58)  SpO2: 94% (04-15-20 @ 12:40)  Wt(kg): --    PHYSICAL EXAM:  General: alert, no acute distress  Eyes:  anicteric, no conjunctival injection, no discharge  Oropharynx: no lesions or injection 	  Neck: supple, without adenopathy  Lungs: clear to auscultation  Heart: regular rate and rhythm; no murmur, rubs or gallops  Abdomen: soft, nondistended, nontender, without mass or organomegaly  Skin: no lesions  Extremities: no clubbing, cyanosis, or edema  Neurologic: alert, oriented, moves all extremities    LAB RESULTS:                        13.6   5.59  )-----------( 163      ( 15 Apr 2020 07:29 )             42.6     04-15    136  |  102  |  22  ----------------------------<  148<H>  4.7   |  19<L>  |  0.87    Ca    9.0      15 Apr 2020 07:16    TPro  6.6  /  Alb  3.1<L>  /  TBili  0.9  /  DBili  x   /  AST  53<H>  /  ALT  58<H>  /  AlkPhos  44  04-15    LIVER FUNCTIONS - ( 15 Apr 2020 07:16 )  Alb: 3.1 g/dL / Pro: 6.6 g/dL / ALK PHOS: 44 U/L / ALT: 58 U/L / AST: 53 U/L / GGT: x         Ferritin, Serum: 1268 ng/mL (04.13.20 @ 08:22)  D-Dimer Assay, Quantitative (04.14.20 @ 07:07)    D-Dimer Assay, Quantitative: 303: D-Dimer result less than 230 ng/mL DDU correlates with the absence  of thrombosis in a patient with a low and moderate       pre-test probability of thrombosis.  1 DDU is approximately equal to  2 ng/mL FEU (previous units). ng/mL DDU  C-Reactive Protein, Serum: 2.45 mg/dL (04.14.20 @ 08:11)            MICROBIOLOGY:  RECENT CULTURES:      RADIOLOGY REVIEWED:    < from: Xray Chest 1 View-PORTABLE IMMEDIATE (04.10.20 @ 11:17) >    EXAM:  XR CHEST PORTABLE IMMED 1V                            PROCEDURE DATE:  04/10/2020            INTERPRETATION:  Indication: Shortness of breath, cough, fever.    Technique: Single portable view of the chest.    Comparison: None.    Findings: The cardiac silhouette is normal in size. There are vague patchy airspace opacities in the peripheral aspects of the bilateral lungs, more pronounced in the left lung. There are no pleural effusions. The hilar mediastinal structures appear unremarkable.    Impression: Vague patchy airspace opacities in the peripheral aspects of the bilateral lungs, concerning for viral pneumonia such as COVID.     < end of copied text >            Assessment:  patient admitted feeling very weak, shakey, cough, found to have covid. He now finished 5th day of plaquenil and is feeling  much better. Inflammatory markers somewhat elevated but not too bad. He is oxygentating well on nasal cannula but fairly high level. will monitor for cytokine storm  Plan:  supportive care  monitor off further antimicrobics,   will monitor for need of immunomodulator

## 2020-04-15 NOTE — PROGRESS NOTE ADULT - SUBJECTIVE AND OBJECTIVE BOX
CARDIOLOGY     PROGRESS  NOTE   ________________________________________________    CHIEF COMPLAINT:Patient is a 74y old  Male who presents with a chief complaint of sob (15 Apr 2020 07:26)  no complain.  	  REVIEW OF SYSTEMS:  CONSTITUTIONAL: No fever, weight loss, or fatigue  EYES: No eye pain, visual disturbances, or discharge  ENT:  No difficulty hearing, tinnitus, vertigo; No sinus or throat pain  NECK: No pain or stiffness  RESPIRATORY: No cough, wheezing, chills or hemoptysis; +Shortness of Breath  CARDIOVASCULAR: No chest pain, palpitations, passing out, dizziness, or leg swelling  GASTROINTESTINAL: No abdominal or epigastric pain. No nausea, vomiting, or hematemesis; No diarrhea or constipation. No melena or hematochezia.  GENITOURINARY: No dysuria, frequency, hematuria, or incontinence  NEUROLOGICAL: No headaches, memory loss, loss of strength, numbness, or tremors  SKIN: No itching, burning, rashes, or lesions   LYMPH Nodes: No enlarged glands  ENDOCRINE: No heat or cold intolerance; No hair loss  MUSCULOSKELETAL: No joint pain or swelling; No muscle, back, or extremity pain  PSYCHIATRIC: No depression, anxiety, mood swings, or difficulty sleeping  HEME/LYMPH: No easy bruising, or bleeding gums  ALLERGY AND IMMUNOLOGIC: No hives or eczema	    [ ] All others negative	  [ ] Unable to obtain    PHYSICAL EXAM:  T(C): 36.5 (04-15-20 @ 04:58), Max: 37.1 (04-14-20 @ 17:34)  HR: 70 (04-15-20 @ 04:58) (70 - 74)  BP: 147/87 (04-15-20 @ 04:58) (147/87 - 160/70)  RR: 20 (04-15-20 @ 04:58) (20 - 22)  SpO2: 97% (04-15-20 @ 04:58) (93% - 97%)  Wt(kg): --  I&O's Summary    14 Apr 2020 07:01  -  15 Apr 2020 07:00  --------------------------------------------------------  IN: 550 mL / OUT: 1300 mL / NET: -750 mL    15 Apr 2020 07:01  -  15 Apr 2020 10:59  --------------------------------------------------------  IN: 240 mL / OUT: 0 mL / NET: 240 mL        Appearance: Normal	  HEENT:   Normal oral mucosa, PERRL, EOMI	  Lymphatic: No lymphadenopathy  Cardiovascular: Normal S1 S2, No JVD, + murmurs, No edema  Respiratory:  clear   Gastrointestinal:  Soft, Non-tender, + BS	  Skin: No rashes, No ecchymoses, No cyanosis	  Neurologic: Non-focal  Extremities: Normal range of motion, No clubbing, cyanosis or edema  Vascular: Peripheral pulses palpable 2+ bilaterally    MEDICATIONS  (STANDING):  enoxaparin Injectable 40 milliGRAM(s) SubCutaneous two times a day  lisinopril 5 milliGRAM(s) Oral daily  methylPREDNISolone sodium succinate Injectable 40 milliGRAM(s) IV Push two times a day  simvastatin 20 milliGRAM(s) Oral at bedtime      TELEMETRY: 	    ECG:  	  RADIOLOGY:  OTHER: 	  	  LABS:	 	    CARDIAC MARKERS:                                13.6   5.59  )-----------( 163      ( 15 Apr 2020 07:29 )             42.6     04-15    136  |  102  |  22  ----------------------------<  148<H>  4.7   |  19<L>  |  0.87    Ca    9.0      15 Apr 2020 07:16    TPro  6.6  /  Alb  3.1<L>  /  TBili  0.9  /  DBili  x   /  AST  53<H>  /  ALT  58<H>  /  AlkPhos  44  04-15    proBNP:   Lipid Profile:   HgA1c:   TSH:     < from: 12 Lead ECG (04.10.20 @ 11:58) >  Ventricular Rate 93 BPM    Atrial Rate 93 BPM    P-R Interval 164 ms    QRS Duration 80 ms    Q-T Interval 386 ms    QTC Calculation([place]ry) 479 ms    P Axis 59 degrees    R Axis -62 degrees    T Axis 55 degrees    Diagnosis Line SINUS RHYTHM WITH PREMATURE ATRIAL COMPLEXES  POSSIBLE LEFT ATRIAL ENLARGEMENT  LEFT AXIS DEVIATION  ABNORMAL ECG    Continue Plaquenil as planned he is scheduled to complete medication on 4/15  Continue supportive care as per primary care team and consultants ie, prn oxygen, prn fluids and prn meds for fever     Assessment and plan  ---------------------------  75 yo male with a pmh of HLD, HTN, c/o sob worse with exertion, fevers, chills for 5 days.  denies chest pain. Pt states he is unable to do minimal activity without getting sob. denies any sick contacts.  quit smoking 15 years ago.   pt also with hx of increase cholesterol , pt was dx with COVID and started on steroid and Hydroxychloroquine  no chest pain, +NEGRON.  pt with covid + ON IV SOLUMEDRAL DAY 2/ O2 REQUIREMENT  DECREASE TO 10 L NC  agree with change Lovenox to BID  will decide on anakinra if worsened  continue Lovenox 40 mg bid  check and fu markers

## 2020-04-15 NOTE — PROGRESS NOTE ADULT - ASSESSMENT
73 yo male      with a pmh of   HLD,  HTN,  ex   smoker,  quit  15  yrs          c/o sob worse with exertion, fevers, chills for 5 days. /  IN  eR ,mild  tachycardia.,   hypoxia  to 90, on oxygen   /  sb[p   of 120 no  cp     Pt states he is unable to do minimal activity without getting sob. denies any sick contacts.   pt with normal  wbc/ mild  hyponatremia.    Covid, Positive, on PLAQUENIL,   s/p   IV STEROID   pt  has  elevated  D dimer/ PTT/  LDH/ fibrinogen  ID eval dr Casey   cxr/ with opacities.  b/l   on lovenox , bid/  Wt is  90 kg  keep  O2  sat above  93  bp  meds  stopped/  sbp  has improved  continue  supportive care/  hypoxia. on oxygen/ on  10  liters/  laquenil  to  be  completed  on 4/ 15  on iv steroid/   not  improving       < from: Xray Chest 1 View-PORTABLE IMMEDIATE (04.10.20 @ 11:17) >  Impression: Vague patchy airspace opacities in the peripheral aspects of the bilateral lungs, concerning for viral pneumonia such as COVID.   < end of copied text >

## 2020-04-16 LAB
ALBUMIN SERPL ELPH-MCNC: 2.8 G/DL — LOW (ref 3.3–5)
ALP SERPL-CCNC: 41 U/L — SIGNIFICANT CHANGE UP (ref 40–120)
ALT FLD-CCNC: 49 U/L — HIGH (ref 10–45)
ANION GAP SERPL CALC-SCNC: 14 MMOL/L — SIGNIFICANT CHANGE UP (ref 5–17)
ANION GAP SERPL CALC-SCNC: 14 MMOL/L — SIGNIFICANT CHANGE UP (ref 5–17)
AST SERPL-CCNC: 39 U/L — SIGNIFICANT CHANGE UP (ref 10–40)
BILIRUB SERPL-MCNC: 0.6 MG/DL — SIGNIFICANT CHANGE UP (ref 0.2–1.2)
BUN SERPL-MCNC: 26 MG/DL — HIGH (ref 7–23)
BUN SERPL-MCNC: 28 MG/DL — HIGH (ref 7–23)
CALCIUM SERPL-MCNC: 8.8 MG/DL — SIGNIFICANT CHANGE UP (ref 8.4–10.5)
CALCIUM SERPL-MCNC: 8.9 MG/DL — SIGNIFICANT CHANGE UP (ref 8.4–10.5)
CHLORIDE SERPL-SCNC: 103 MMOL/L — SIGNIFICANT CHANGE UP (ref 96–108)
CHLORIDE SERPL-SCNC: 103 MMOL/L — SIGNIFICANT CHANGE UP (ref 96–108)
CO2 SERPL-SCNC: 17 MMOL/L — LOW (ref 22–31)
CO2 SERPL-SCNC: 18 MMOL/L — LOW (ref 22–31)
CREAT SERPL-MCNC: 0.8 MG/DL — SIGNIFICANT CHANGE UP (ref 0.5–1.3)
CREAT SERPL-MCNC: 0.84 MG/DL — SIGNIFICANT CHANGE UP (ref 0.5–1.3)
D DIMER BLD IA.RAPID-MCNC: 417 NG/ML DDU — HIGH
FERRITIN SERPL-MCNC: 2552 NG/ML — HIGH (ref 30–400)
GLUCOSE SERPL-MCNC: 202 MG/DL — HIGH (ref 70–99)
GLUCOSE SERPL-MCNC: 358 MG/DL — HIGH (ref 70–99)
HCT VFR BLD CALC: 43.7 % — SIGNIFICANT CHANGE UP (ref 39–50)
HGB BLD-MCNC: 13.7 G/DL — SIGNIFICANT CHANGE UP (ref 13–17)
LDH SERPL L TO P-CCNC: 502 U/L — HIGH (ref 50–242)
MCHC RBC-ENTMCNC: 28.3 PG — SIGNIFICANT CHANGE UP (ref 27–34)
MCHC RBC-ENTMCNC: 31.4 GM/DL — LOW (ref 32–36)
MCV RBC AUTO: 90.3 FL — SIGNIFICANT CHANGE UP (ref 80–100)
NRBC # BLD: 0 /100 WBCS — SIGNIFICANT CHANGE UP (ref 0–0)
PLATELET # BLD AUTO: 155 K/UL — SIGNIFICANT CHANGE UP (ref 150–400)
POTASSIUM SERPL-MCNC: 5 MMOL/L — SIGNIFICANT CHANGE UP (ref 3.5–5.3)
POTASSIUM SERPL-MCNC: 5.7 MMOL/L — HIGH (ref 3.5–5.3)
POTASSIUM SERPL-SCNC: 5 MMOL/L — SIGNIFICANT CHANGE UP (ref 3.5–5.3)
POTASSIUM SERPL-SCNC: 5.7 MMOL/L — HIGH (ref 3.5–5.3)
PROCALCITONIN SERPL-MCNC: 0.12 NG/ML — HIGH (ref 0.02–0.1)
PROT SERPL-MCNC: 6.4 G/DL — SIGNIFICANT CHANGE UP (ref 6–8.3)
RBC # BLD: 4.84 M/UL — SIGNIFICANT CHANGE UP (ref 4.2–5.8)
RBC # FLD: 14.5 % — SIGNIFICANT CHANGE UP (ref 10.3–14.5)
SODIUM SERPL-SCNC: 134 MMOL/L — LOW (ref 135–145)
SODIUM SERPL-SCNC: 135 MMOL/L — SIGNIFICANT CHANGE UP (ref 135–145)
WBC # BLD: 8 K/UL — SIGNIFICANT CHANGE UP (ref 3.8–10.5)
WBC # FLD AUTO: 8 K/UL — SIGNIFICANT CHANGE UP (ref 3.8–10.5)

## 2020-04-16 PROCEDURE — 99232 SBSQ HOSP IP/OBS MODERATE 35: CPT | Mod: CS

## 2020-04-16 RX ORDER — AMLODIPINE BESYLATE 2.5 MG/1
2.5 TABLET ORAL DAILY
Refills: 0 | Status: DISCONTINUED | OUTPATIENT
Start: 2020-04-16 | End: 2020-04-21

## 2020-04-16 RX ADMIN — Medication 100 MILLIGRAM(S): at 12:30

## 2020-04-16 RX ADMIN — Medication 40 MILLIGRAM(S): at 05:43

## 2020-04-16 RX ADMIN — ENOXAPARIN SODIUM 40 MILLIGRAM(S): 100 INJECTION SUBCUTANEOUS at 05:43

## 2020-04-16 RX ADMIN — AMLODIPINE BESYLATE 2.5 MILLIGRAM(S): 2.5 TABLET ORAL at 12:29

## 2020-04-16 RX ADMIN — LISINOPRIL 5 MILLIGRAM(S): 2.5 TABLET ORAL at 05:43

## 2020-04-16 RX ADMIN — SIMVASTATIN 20 MILLIGRAM(S): 20 TABLET, FILM COATED ORAL at 20:55

## 2020-04-16 RX ADMIN — ENOXAPARIN SODIUM 40 MILLIGRAM(S): 100 INJECTION SUBCUTANEOUS at 16:22

## 2020-04-16 RX ADMIN — Medication 40 MILLIGRAM(S): at 16:22

## 2020-04-16 NOTE — PROGRESS NOTE ADULT - SUBJECTIVE AND OBJECTIVE BOX
CARDIOLOGY     PROGRESS  NOTE   ________________________________________________    CHIEF COMPLAINT:Patient is a 74y old  Male who presents with a chief complaint of sob (16 Apr 2020 11:26)  no complain.  	  REVIEW OF SYSTEMS:  CONSTITUTIONAL: No fever, weight loss, or fatigue  EYES: No eye pain, visual disturbances, or discharge  ENT:  No difficulty hearing, tinnitus, vertigo; No sinus or throat pain  NECK: No pain or stiffness  RESPIRATORY: No cough, wheezing, chills or hemoptysis; + Shortness of Breath  CARDIOVASCULAR: No chest pain, palpitations, passing out, dizziness, or leg swelling  GASTROINTESTINAL: No abdominal or epigastric pain. No nausea, vomiting, or hematemesis; No diarrhea or constipation. No melena or hematochezia.  GENITOURINARY: No dysuria, frequency, hematuria, or incontinence  NEUROLOGICAL: No headaches, memory loss, loss of strength, numbness, or tremors  SKIN: No itching, burning, rashes, or lesions   LYMPH Nodes: No enlarged glands  ENDOCRINE: No heat or cold intolerance; No hair loss  MUSCULOSKELETAL: No joint pain or swelling; No muscle, back, or extremity pain  PSYCHIATRIC: No depression, anxiety, mood swings, or difficulty sleeping  HEME/LYMPH: No easy bruising, or bleeding gums  ALLERGY AND IMMUNOLOGIC: No hives or eczema	    [ ] All others negative	  [ ] Unable to obtain    PHYSICAL EXAM:  T(C): 36.1 (04-16-20 @ 08:25), Max: 37.2 (04-15-20 @ 18:22)  HR: 70 (04-16-20 @ 08:25) (66 - 75)  BP: 148/92 (04-16-20 @ 08:25) (118/73 - 148/92)  RR: 22 (04-16-20 @ 08:25) (22 - 22)  SpO2: 93% (04-16-20 @ 08:25) (93% - 94%)  Wt(kg): --  I&O's Summary    15 Apr 2020 07:01  -  16 Apr 2020 07:00  --------------------------------------------------------  IN: 480 mL / OUT: 900 mL / NET: -420 mL        Appearance: Normal	  HEENT:   Normal oral mucosa, PERRL, EOMI	  Lymphatic: No lymphadenopathy  Cardiovascular: Normal S1 S2, No JVD, + murmurs, No edema  Respiratory: Lungs clear to auscultation	  Psychiatry: A & O x 3, Mood & affect appropriate  Gastrointestinal:  Soft, Non-tender, + BS	  Skin: No rashes, No ecchymoses, No cyanosis	  Neurologic: Non-focal  Extremities: Normal range of motion, No clubbing, cyanosis or edema  Vascular: Peripheral pulses palpable 2+ bilaterally    MEDICATIONS  (STANDING):  enoxaparin Injectable 40 milliGRAM(s) SubCutaneous two times a day  lisinopril 5 milliGRAM(s) Oral daily  methylPREDNISolone sodium succinate Injectable 40 milliGRAM(s) IV Push two times a day  simvastatin 20 milliGRAM(s) Oral at bedtime      TELEMETRY: 	    ECG:  	  RADIOLOGY:  OTHER: 	  	  LABS:	 	    CARDIAC MARKERS:                                13.7   8.00  )-----------( 155      ( 16 Apr 2020 06:55 )             43.7     04-16    135  |  103  |  26<H>  ----------------------------<  202<H>  5.7<H>   |  18<L>  |  0.80    Ca    8.9      16 Apr 2020 06:54    TPro  6.4  /  Alb  2.8<L>  /  TBili  0.6  /  DBili  x   /  AST  39  /  ALT  49<H>  /  AlkPhos  41  04-16    proBNP:   Lipid Profile:   HgA1c:   TSH:         Assessment and plan  ---------------------------  73 yo male with a pmh of HLD, HTN, c/o sob worse with exertion, fevers, chills for 5 days.  denies chest pain. Pt states he is unable to do minimal activity without getting sob. denies any sick contacts.  quit smoking 15 years ago.   pt also with hx of increase cholesterol , pt was dx with COVID and started on steroid and Hydroxychloroquine  no chest pain, +NEGRON.  pt with covid + ON IV SOLUMEDRAL DAY 2/ O2 REQUIREMENT  DECREASE TO 10 L NC  agree with change Lovenox to BID  will decide on anakinra if worsened  check and fu markers  HTN dc lisinopril, will add calcium channel blocker  o2 sat 92 % on 10 L NC, continue steroid day 3  will consider ANAKINRA  fu lytes

## 2020-04-16 NOTE — PROGRESS NOTE ADULT - ASSESSMENT
74 year old male who was diagnosed with COVID-19 viral pneumonia   He presents with cough, shortness of breath and hypoxia   He continues to require oxgen as per flow sheets he was on 10L of oxygen  He has completed his full course of Plaquenil as planned   Flow sheets reviewed and he remains afebrile   his wbc are wnl     Plan:  Continue supportive care as per primary care team and consultants ie, prn oxygen, prn fluids and prn meds for fever

## 2020-04-16 NOTE — PROGRESS NOTE ADULT - SUBJECTIVE AND OBJECTIVE BOX
CC: f/u for COVID-19     Patient is awake and alert he reports that when he moves he starts with cough    REVIEW OF SYSTEMS:  All other review of systems negative (Comprehensive ROS)    Antimicrobials Day #      Other Medications Reviewed    Vital Signs Last 24 Hrs  T(C): 36.1 (16 Apr 2020 08:25), Max: 37.2 (15 Apr 2020 18:22)  T(F): 96.9 (16 Apr 2020 08:25), Max: 98.9 (15 Apr 2020 18:22)  HR: 70 (16 Apr 2020 08:25) (66 - 75)  BP: 148/92 (16 Apr 2020 08:25) (118/73 - 148/92)  BP(mean): --  RR: 22 (16 Apr 2020 08:25) (22 - 22)  SpO2: 93% (16 Apr 2020 08:25) (93% - 94%)  PHYSICAL EXAM:  General: alert, no acute distress  Eyes:  anicteric, no conjunctival injection, no discharge  Oropharynx: no lesions or injection 	  Lungs: rales to auscultation  Heart: regular rate and rhythm; no murmur, rubs or gallops  Abdomen: soft, nondistended, nontender  Skin: no lesions  Extremities: no clubbing, cyanosis, or edema  Neurologic: alert,moves all extremities    LAB RESULTS:                        13.7   8.00  )-----------( 155      ( 16 Apr 2020 06:55 )             43.7                           15.3   8.76  )-----------( 163      ( 13 Apr 2020 06:20 )             48.5             04-16    135  |  103  |  26<H>  ----------------------------<  202<H>  5.7<H>   |  18<L>  |  0.80    Ca    8.9      16 Apr 2020 06:54    TPro  6.4  /  Alb  2.8<L>  /  TBili  0.6  /  DBili  x   /  AST  39  /  ALT  49<H>  /  AlkPhos  41  04-16    Ferritin, Serum: 1888 ng/mL (04.10.20 @ 13:12)  Lactate Dehydrogenase, Serum: 492 U/L (04.10.20 @ 10:57)  D-Dimer Assay, Quantitative: 500: D-Dimer result less than 230 ng/mL DDU correlates with the absence  of thrombosis in a patient with a low and moderate       pre-test probability of thrombosis.  1 DDU is approximately equal to  2 ng/mL FEU (previous units). ng/mL DDU (04.10.20 @ 10:57)        MICROBIOLOGY:  RECENT CULTURES:      RADIOLOGY REVIEWED:        < from: Xray Chest 1 View-PORTABLE IMMEDIATE (04.10.20 @ 11:17) >    EXAM:  XR CHEST PORTABLE IMMED 1V                            PROCEDURE DATE:  04/10/2020            INTERPRETATION:  Indication: Shortness of breath, cough, fever.    Technique: Single portable view of the chest.    Comparison: None.    Findings: The cardiac silhouette is normal in size. There are vague patchy airspace opacities in the peripheral aspects of the bilateral lungs, more pronounced in the left lung. There are no pleural effusions. The hilar mediastinal structures appear unremarkable.    Impression: Vague patchy airspace opacities in the peripheral aspects of the bilateral lungs, concerning for viral pneumonia such as COVID.     < end of copied text >

## 2020-04-16 NOTE — PROGRESS NOTE ADULT - SUBJECTIVE AND OBJECTIVE BOX
on 10  liters    REVIEW OF SYSTEMS:  GEN: no fever,    no chills  RESP:  SOB,   no cough  CVS: no chest pain,   no palpitations  GI: no abdominal pain,   no nausea,   no vomiting,   no constipation,   no diarrhea  : no dysuria,   no frequency  NEURO: no headache,   no dizziness  PSYCH: no depression,   not anxious  Derm : no rash    MEDICATIONS  (STANDING):  enoxaparin Injectable 40 milliGRAM(s) SubCutaneous two times a day  lisinopril 5 milliGRAM(s) Oral daily  methylPREDNISolone sodium succinate Injectable 40 milliGRAM(s) IV Push two times a day  simvastatin 20 milliGRAM(s) Oral at bedtime    MEDICATIONS  (PRN):  acetaminophen   Tablet .. 650 milliGRAM(s) Oral every 6 hours PRN Mild Pain (1 - 3), Moderate Pain (4 - 6)  benzonatate 100 milliGRAM(s) Oral three times a day PRN Cough      Vital Signs Last 24 Hrs  T(C): 36.1 (16 Apr 2020 08:25), Max: 37.2 (15 Apr 2020 18:22)  T(F): 96.9 (16 Apr 2020 08:25), Max: 98.9 (15 Apr 2020 18:22)  HR: 70 (16 Apr 2020 08:25) (66 - 75)  BP: 148/92 (16 Apr 2020 08:25) (118/73 - 148/92)  BP(mean): --  RR: 22 (16 Apr 2020 08:25) (22 - 22)  SpO2: 93% (16 Apr 2020 08:25) (93% - 94%)  CAPILLARY BLOOD GLUCOSE      POCT Blood Glucose.: 198 mg/dL (15 Apr 2020 21:53)    I&O's Summary    15 Apr 2020 07:01  -  16 Apr 2020 07:00  --------------------------------------------------------  IN: 480 mL / OUT: 900 mL / NET: -420 mL        PHYSICAL EXAM:  HEAD:  Atraumatic, Normocephalic  NECK: Supple, No   JVD  CHEST/LUNG:   no     rales,     no,    rhonchi  HEART: Regular rate and rhythm;         murmur  ABDOMEN: Soft, Nontender, ;   EXTREMITIES:   no     edema  NEUROLOGY:  alert    LABS:                        13.7   8.00  )-----------( 155      ( 16 Apr 2020 06:55 )             43.7     04-16    135  |  103  |  26<H>  ----------------------------<  202<H>  5.7<H>   |  18<L>  |  0.80    Ca    8.9      16 Apr 2020 06:54    TPro  6.4  /  Alb  2.8<L>  /  TBili  0.6  /  DBili  x   /  AST  39  /  ALT  49<H>  /  AlkPhos  41  04-16                            Consultant(s) Notes Reviewed:      Care Discussed with Consultants/Other Providers:

## 2020-04-16 NOTE — PROGRESS NOTE ADULT - ASSESSMENT
75 yo male      with a pmh of   HLD,  HTN,  ex   smoker,  quit  15  yrs          c/o sob worse with exertion, fevers, chills for 5 days. /  IN  eR ,mild  tachycardia.,   hypoxia  to 90, on oxygen   /  sb[p   of 120 no  cp     Pt states he is unable to do minimal activity without getting sob. denies any sick contacts.   pt with normal  wbc/ mild  hyponatremia.    Covid, Positive, on PLAQUENIL,   s/p   IV STEROID   pt  has  elevated  D dimer/ PTT/  LDH/ fibrinogen  ID eval dr Casey   cxr/ with opacities.  b/l   on lovenox , bid/  Wt is  90 kg  keep  O2  sat above  93  bp  meds  stopped/  sbp  has improved  continue  supportive care/  hypoxia. on oxygen/ on  10  liters/  laquenil  completed  on 4/ 15  on iv steroid/   not  improving/  continue  care       < from: Xray Chest 1 View-PORTABLE IMMEDIATE (04.10.20 @ 11:17) >  Impression: Vague patchy airspace opacities in the peripheral aspects of the bilateral lungs, concerning for viral pneumonia such as COVID.   < end of copied text >

## 2020-04-17 LAB
HCT VFR BLD CALC: 43.8 % — SIGNIFICANT CHANGE UP (ref 39–50)
HGB BLD-MCNC: 13.9 G/DL — SIGNIFICANT CHANGE UP (ref 13–17)
MCHC RBC-ENTMCNC: 28.2 PG — SIGNIFICANT CHANGE UP (ref 27–34)
MCHC RBC-ENTMCNC: 31.7 GM/DL — LOW (ref 32–36)
MCV RBC AUTO: 88.8 FL — SIGNIFICANT CHANGE UP (ref 80–100)
NRBC # BLD: 0 /100 WBCS — SIGNIFICANT CHANGE UP (ref 0–0)
PLATELET # BLD AUTO: 225 K/UL — SIGNIFICANT CHANGE UP (ref 150–400)
RBC # BLD: 4.93 M/UL — SIGNIFICANT CHANGE UP (ref 4.2–5.8)
RBC # FLD: 14.3 % — SIGNIFICANT CHANGE UP (ref 10.3–14.5)
WBC # BLD: 9.01 K/UL — SIGNIFICANT CHANGE UP (ref 3.8–10.5)
WBC # FLD AUTO: 9.01 K/UL — SIGNIFICANT CHANGE UP (ref 3.8–10.5)

## 2020-04-17 PROCEDURE — 99232 SBSQ HOSP IP/OBS MODERATE 35: CPT | Mod: CS

## 2020-04-17 RX ORDER — VANCOMYCIN HCL 1 G
1000 VIAL (EA) INTRAVENOUS EVERY 12 HOURS
Refills: 0 | Status: DISCONTINUED | OUTPATIENT
Start: 2020-04-18 | End: 2020-04-18

## 2020-04-17 RX ORDER — CEFEPIME 1 G/1
1000 INJECTION, POWDER, FOR SOLUTION INTRAMUSCULAR; INTRAVENOUS EVERY 12 HOURS
Refills: 0 | Status: DISCONTINUED | OUTPATIENT
Start: 2020-04-17 | End: 2020-04-19

## 2020-04-17 RX ORDER — VANCOMYCIN HCL 1 G
1000 VIAL (EA) INTRAVENOUS ONCE
Refills: 0 | Status: COMPLETED | OUTPATIENT
Start: 2020-04-17 | End: 2020-04-18

## 2020-04-17 RX ORDER — VANCOMYCIN HCL 1 G
VIAL (EA) INTRAVENOUS
Refills: 0 | Status: DISCONTINUED | OUTPATIENT
Start: 2020-04-18 | End: 2020-04-18

## 2020-04-17 RX ORDER — SODIUM CHLORIDE 0.65 %
1 AEROSOL, SPRAY (ML) NASAL
Refills: 0 | Status: DISCONTINUED | OUTPATIENT
Start: 2020-04-17 | End: 2020-04-20

## 2020-04-17 RX ADMIN — AMLODIPINE BESYLATE 2.5 MILLIGRAM(S): 2.5 TABLET ORAL at 05:32

## 2020-04-17 RX ADMIN — SIMVASTATIN 20 MILLIGRAM(S): 20 TABLET, FILM COATED ORAL at 20:19

## 2020-04-17 RX ADMIN — ENOXAPARIN SODIUM 40 MILLIGRAM(S): 100 INJECTION SUBCUTANEOUS at 17:31

## 2020-04-17 RX ADMIN — ENOXAPARIN SODIUM 40 MILLIGRAM(S): 100 INJECTION SUBCUTANEOUS at 05:32

## 2020-04-17 RX ADMIN — Medication 40 MILLIGRAM(S): at 05:32

## 2020-04-17 NOTE — PROGRESS NOTE ADULT - SUBJECTIVE AND OBJECTIVE BOX
CARDIOLOGY     PROGRESS  NOTE   ________________________________________________    CHIEF COMPLAINT:Patient is a 74y old  Male who presents with a chief complaint of sob (17 Apr 2020 07:09)  no complain.  	  REVIEW OF SYSTEMS:  CONSTITUTIONAL: No fever, weight loss, or fatigue  EYES: No eye pain, visual disturbances, or discharge  ENT:  No difficulty hearing, tinnitus, vertigo; No sinus or throat pain  NECK: No pain or stiffness  RESPIRATORY: No cough, wheezing, chills or hemoptysis; No Shortness of Breath  CARDIOVASCULAR: No chest pain, palpitations, passing out, dizziness, or leg swelling  GASTROINTESTINAL: No abdominal or epigastric pain. No nausea, vomiting, or hematemesis; No diarrhea or constipation. No melena or hematochezia.  GENITOURINARY: No dysuria, frequency, hematuria, or incontinence  NEUROLOGICAL: No headaches, memory loss, loss of strength, numbness, or tremors  SKIN: No itching, burning, rashes, or lesions   LYMPH Nodes: No enlarged glands  ENDOCRINE: No heat or cold intolerance; No hair loss  MUSCULOSKELETAL: No joint pain or swelling; No muscle, back, or extremity pain  PSYCHIATRIC: No depression, anxiety, mood swings, or difficulty sleeping  HEME/LYMPH: No easy bruising, or bleeding gums  ALLERGY AND IMMUNOLOGIC: No hives or eczema	    [ ] All others negative	  [ ] Unable to obtain    PHYSICAL EXAM:  T(C): 36.6 (04-17-20 @ 09:27), Max: 36.9 (04-16-20 @ 17:28)  HR: 65 (04-17-20 @ 09:27) (65 - 79)  BP: 134/80 (04-17-20 @ 09:27) (127/76 - 140/84)  RR: 22 (04-17-20 @ 09:27) (20 - 22)  SpO2: 94% (04-17-20 @ 09:27) (93% - 94%)  Wt(kg): --  I&O's Summary    16 Apr 2020 07:01  -  17 Apr 2020 07:00  --------------------------------------------------------  IN: 750 mL / OUT: 900 mL / NET: -150 mL        Appearance: Normal	  HEENT:   Normal oral mucosa, PERRL, EOMI	  Lymphatic: No lymphadenopathy  Cardiovascular: Normal S1 S2, No JVD, + murmurs, No edema  Respiratory: Lungs clear to auscultation	  Psychiatry: A & O x 3, Mood & affect appropriate  Gastrointestinal:  Soft, Non-tender, + BS	  Skin: No rashes, No ecchymoses, No cyanosis	  Neurologic: Non-focal  Extremities: Normal range of motion, No clubbing, cyanosis or edema  Vascular: Peripheral pulses palpable 2+ bilaterally    MEDICATIONS  (STANDING):  amLODIPine   Tablet 2.5 milliGRAM(s) Oral daily  enoxaparin Injectable 40 milliGRAM(s) SubCutaneous two times a day  simvastatin 20 milliGRAM(s) Oral at bedtime      TELEMETRY: 	    ECG:  	  RADIOLOGY:  OTHER: 	  	  LABS:	 	    CARDIAC MARKERS:                                13.7   8.00  )-----------( 155      ( 16 Apr 2020 06:55 )             43.7     04-16    134<L>  |  103  |  28<H>  ----------------------------<  358<H>  5.0   |  17<L>  |  0.84    Ca    8.8      16 Apr 2020 11:34    TPro  6.4  /  Alb  2.8<L>  /  TBili  0.6  /  DBili  x   /  AST  39  /  ALT  49<H>  /  AlkPhos  41  04-16    proBNP:   Lipid Profile:   HgA1c:   TSH:         Assessment and plan  ---------------------------  75 yo male with a pmh of HLD, HTN, c/o sob worse with exertion, fevers, chills for 5 days.  denies chest pain. Pt states he is unable to do minimal activity without getting sob. denies any sick contacts.  quit smoking 15 years ago.   pt also with hx of increase cholesterol , pt was dx with COVID and started on steroid and Hydroxychloroquine  no chest pain, +NEGRON.  pt with covid + ON IV SOLUMEDRAL DAY 2/ O2 REQUIREMENT  DECREASE TO 10 L NC  agree with change Lovenox to BID  will decide on anakinra if worsened  check and fu markers  HTN dc lisinopril, will add calcium channel blocker  o2 sat 92 % on 8 L NC improving steroid dcd  will consider ANAKINRA  fu lytes

## 2020-04-17 NOTE — CHART NOTE - NSCHARTNOTEFT_GEN_A_CORE
Called by Nursing patient with new unset of rigors. Vital signs stable at present time  Spoke with ID  recommends labs blood culture and cbc then start IV antibiotic will  see patient in am. Continue close observation    Pawan Barrera PA-C  Orthopaedic Surgery  Team pager [phone]  [phone]

## 2020-04-17 NOTE — PROGRESS NOTE ADULT - SUBJECTIVE AND OBJECTIVE BOX
CC: f/u for COVID-19     Patient continues to require oxygen support on nasal cannula     REVIEW OF SYSTEMS:  All other review of systems negative (Comprehensive ROS)    Antimicrobials Day #      Other Medications Reviewed    Vital Signs Last 24 Hrs  T(C): 36.6 (17 Apr 2020 09:27), Max: 36.9 (16 Apr 2020 17:28)  T(F): 97.9 (17 Apr 2020 09:27), Max: 98.4 (16 Apr 2020 17:28)  HR: 65 (17 Apr 2020 09:27) (65 - 79)  BP: 134/80 (17 Apr 2020 09:27) (127/76 - 140/84)  BP(mean): --  RR: 22 (17 Apr 2020 09:27) (20 - 22)  SpO2: 94% (17 Apr 2020 09:27) (93% - 94%)    Defer exam the primary medical team     given pandemic and to minimize exposure to healthcare workers did not go in room    LAB RESULTS:             no new cbc today                        13.7   8.00  )-----------( 155      ( 16 Apr 2020 06:55 )             43.7                04-16    134<L>  |  103  |  28<H>  ----------------------------<  358<H>  5.0   |  17<L>  |  0.84    Ca    8.8      16 Apr 2020 11:34    TPro  6.4  /  Alb  2.8<L>  /  TBili  0.6  /  DBili  x   /  AST  39  /  ALT  49<H>  /  AlkPhos  41  04-16      135  |  103  |  26<H>  ----------------------------<  202<H>  5.7<H>   |  18<L>  |  0.80    Ca    8.9      16 Apr 2020 06:54    TPro  6.4  /  Alb  2.8<L>  /  TBili  0.6  /  DBili  x   /  AST  39  /  ALT  49<H>  /  AlkPhos  41  04-16    Ferritin, Serum: 1888 ng/mL (04.10.20 @ 13:12)  Lactate Dehydrogenase, Serum: 492 U/L (04.10.20 @ 10:57)  D-Dimer Assay, Quantitative: 500: D-Dimer result less than 230 ng/mL DDU correlates with the absence  of thrombosis in a patient with a low and moderate       pre-test probability of thrombosis.  1 DDU is approximately equal to  2 ng/mL FEU (previous units). ng/mL DDU (04.10.20 @ 10:57)        MICROBIOLOGY:  RECENT CULTURES:      RADIOLOGY REVIEWED:        < from: Xray Chest 1 View-PORTABLE IMMEDIATE (04.10.20 @ 11:17) >    EXAM:  XR CHEST PORTABLE IMMED 1V                            PROCEDURE DATE:  04/10/2020            INTERPRETATION:  Indication: Shortness of breath, cough, fever.    Technique: Single portable view of the chest.    Comparison: None.    Findings: The cardiac silhouette is normal in size. There are vague patchy airspace opacities in the peripheral aspects of the bilateral lungs, more pronounced in the left lung. There are no pleural effusions. The hilar mediastinal structures appear unremarkable.    Impression: Vague patchy airspace opacities in the peripheral aspects of the bilateral lungs, concerning for viral pneumonia such as COVID.     < end of copied text >

## 2020-04-17 NOTE — PROGRESS NOTE ADULT - ASSESSMENT
74 year old male who was diagnosed with COVID-19 viral pneumonia   He presents with cough, shortness of breath and hypoxia   He continues to require oxgen reviewed flow sheets and it appears that the oxygen was titrated down 10-->8  He has completed his full course of Plaquenil as planned   Flow sheets reviewed and he remains afebrile   his wbc are wnl     Plan:  Continue supportive care as per primary care team and consultants ie, prn oxygen, prn fluids and prn meds for fever

## 2020-04-17 NOTE — PROGRESS NOTE ADULT - ASSESSMENT
73 yo male      with a pmh of   HLD,  HTN,  ex   smoker,  quit  15  yrs          c/o sob worse with exertion, fevers, chills for 5 days. /  IN  eR ,mild  tachycardia.,   hypoxia  to 90, on oxygen   /  sb[p   of 120 no  cp     Pt states he is unable to do minimal activity without getting sob. denies any sick contacts.   pt with normal  wbc/ mild  hyponatremia.    Covid, Positive, on PLAQUENIL,   s/p   IV STEROID   pt  has  elevated  D dimer/ PTT/  LDH/ fibrinogen  ID eval dr Casey   cxr/ with opacities.  b/l   on lovenox , bid/  Wt is  90 kg  keep  O2  sat above  93  bp  meds  titrated  and   sbp  has improved  continue  supportive care/  hypoxia. on oxygen/  laquenil  completed  on 4/ 15  and   s/p    iv steroid/    still requiring    high  O2  requirements,   continue   current  care       < from: Xray Chest 1 View-PORTABLE IMMEDIATE (04.10.20 @ 11:17) >  Impression: Vague patchy airspace opacities in the peripheral aspects of the bilateral lungs, concerning for viral pneumonia such as COVID.   < end of copied text >

## 2020-04-17 NOTE — PROGRESS NOTE ADULT - SUBJECTIVE AND OBJECTIVE BOX
sob  on ecertion    REVIEW OF SYSTEMS:  GEN: no fever,    no chills  RESP: SOB,   no cough  CVS: no chest pain,   no palpitations  GI: no abdominal pain,   no nausea,   no vomiting,   no constipation,   no diarrhea  : no dysuria,   no frequency  NEURO: no headache,   no dizziness  PSYCH: no depression,   not anxious  Derm : no rash    MEDICATIONS  (STANDING):  amLODIPine   Tablet 2.5 milliGRAM(s) Oral daily  enoxaparin Injectable 40 milliGRAM(s) SubCutaneous two times a day  simvastatin 20 milliGRAM(s) Oral at bedtime    MEDICATIONS  (PRN):  acetaminophen   Tablet .. 650 milliGRAM(s) Oral every 6 hours PRN Mild Pain (1 - 3), Moderate Pain (4 - 6)  benzonatate 100 milliGRAM(s) Oral three times a day PRN Cough      Vital Signs Last 24 Hrs  T(C): 36.5 (17 Apr 2020 04:45), Max: 36.9 (16 Apr 2020 17:28)  T(F): 97.7 (17 Apr 2020 04:45), Max: 98.4 (16 Apr 2020 17:28)  HR: 75 (17 Apr 2020 04:45) (70 - 79)  BP: 140/84 (17 Apr 2020 04:45) (127/76 - 148/92)  BP(mean): --  RR: 20 (17 Apr 2020 04:45) (20 - 22)  SpO2: 93% (17 Apr 2020 04:45) (93% - 93%)  CAPILLARY BLOOD GLUCOSE        I&O's Summary    16 Apr 2020 07:01  -  17 Apr 2020 07:00  --------------------------------------------------------  IN: 750 mL / OUT: 900 mL / NET: -150 mL        PHYSICAL EXAM:  HEAD:  Atraumatic, Normocephalic  NECK: Supple, No   JVD  CHEST/LUNG:   no     rales,     no,    rhonchi  HEART: Regular rate and rhythm;         murmur  ABDOMEN: Soft, Nontender, ;   EXTREMITIES:  no      edema  NEUROLOGY:  alert    LABS:                        13.7   8.00  )-----------( 155      ( 16 Apr 2020 06:55 )             43.7     04-16    134<L>  |  103  |  28<H>  ----------------------------<  358<H>  5.0   |  17<L>  |  0.84    Ca    8.8      16 Apr 2020 11:34    TPro  6.4  /  Alb  2.8<L>  /  TBili  0.6  /  DBili  x   /  AST  39  /  ALT  49<H>  /  AlkPhos  41  04-16                            Consultant(s) Notes Reviewed:      Care Discussed with Consultants/Other Providers:

## 2020-04-18 PROCEDURE — 99232 SBSQ HOSP IP/OBS MODERATE 35: CPT | Mod: CS

## 2020-04-18 RX ADMIN — ENOXAPARIN SODIUM 40 MILLIGRAM(S): 100 INJECTION SUBCUTANEOUS at 17:25

## 2020-04-18 RX ADMIN — Medication 250 MILLIGRAM(S): at 11:55

## 2020-04-18 RX ADMIN — AMLODIPINE BESYLATE 2.5 MILLIGRAM(S): 2.5 TABLET ORAL at 06:52

## 2020-04-18 RX ADMIN — ENOXAPARIN SODIUM 40 MILLIGRAM(S): 100 INJECTION SUBCUTANEOUS at 06:52

## 2020-04-18 RX ADMIN — CEFEPIME 100 MILLIGRAM(S): 1 INJECTION, POWDER, FOR SOLUTION INTRAMUSCULAR; INTRAVENOUS at 11:55

## 2020-04-18 RX ADMIN — CEFEPIME 100 MILLIGRAM(S): 1 INJECTION, POWDER, FOR SOLUTION INTRAMUSCULAR; INTRAVENOUS at 00:06

## 2020-04-18 RX ADMIN — CEFEPIME 100 MILLIGRAM(S): 1 INJECTION, POWDER, FOR SOLUTION INTRAMUSCULAR; INTRAVENOUS at 23:40

## 2020-04-18 RX ADMIN — Medication 250 MILLIGRAM(S): at 00:07

## 2020-04-18 RX ADMIN — SIMVASTATIN 20 MILLIGRAM(S): 20 TABLET, FILM COATED ORAL at 21:02

## 2020-04-18 NOTE — PROGRESS NOTE ADULT - ASSESSMENT
73 yo male      with a pmh of   HLD,  HTN,  ex   smoker,  quit  15  yrs          c/o sob worse with exertion, fevers, chills for 5 days. /  IN  eR ,mild  tachycardia.,   hypoxia  to 90, on oxygen   /  sb[p   of 120 no  cp     Pt states he is unable to do minimal activity without getting sob. denies any sick contacts.   pt with normal  wbc/ mild  hyponatremia.    Covid, Positive, on PLAQUENIL,   s/p   IV STEROID   pt  has  elevated  D dimer/ PTT/  LDH/ fibrinogen  ID eval dr Casey,   cxr/ with opacities.  b/l   on lovenox , bid/  Wt is  90 kg    bp  meds  titrated  and   sbp  has improved  continue  supportive care/  hypoxia. on oxygen/  laquenil  completed  on 4/ 15  and   s/p    iv steroid/    still requiring    high  O2  requirements,   continue   current  care/  on  8  liters  now  does  not  need  daily labs       < from: Xray Chest 1 View-PORTABLE IMMEDIATE (04.10.20 @ 11:17) >  Impression: Vague patchy airspace opacities in the peripheral aspects of the bilateral lungs, concerning for viral pneumonia such as COVID.   < end of copied text > 73 yo male      with a pmh of   HLD,  HTN,  ex   smoker,  quit  15  yrs          c/o sob worse with exertion, fevers, chills for 5 days. /  IN  eR ,mild  tachycardia.,   hypoxia  to 90, on oxygen   /  sb[p   of 120 no  cp     Pt states he is unable to do minimal activity without getting sob. denies any sick contacts.   pt with normal  wbc/ mild  hyponatremia.    Covid, Positive, on PLAQUENIL,   s/p   IV STEROID   pt  has  elevated  D dimer/ PTT/  LDH/ fibrinogen  ID eval dr Casey,   cxr/ with opacities.  b/l   on lovenox , bid/  Wt is  90 kg    bp  meds  titrated  and   sbp  has improved  continue  supportive care/  hypoxia. on oxygen/  laquenil  completed  on 4/ 15  and   s/p    iv steroid/    still requiring    high  O2  requirements,   continue   current  care/  on  8  liters  now  does  not  need  daily labs   cefepime  and  vanco, started  by  dr fontanez/ ?  benefit/  pt  afebrile normal  wbc         < from: Xray Chest 1 View-PORTABLE IMMEDIATE (04.10.20 @ 11:17) >  Impression: Vague patchy airspace opacities in the peripheral aspects of the bilateral lungs, concerning for viral pneumonia such as COVID.   < end of copied text >

## 2020-04-18 NOTE — PROGRESS NOTE ADULT - ASSESSMENT
74 year old male who was diagnosed with COVID-19 viral pneumonia   He presents with cough, shortness of breath and hypoxia   He continues to require oxgen reviewed flow sheets and it appears that the oxygen was titrated down 10-->8  He has completed his full course of Plaquenil as planned   Events noted from yesterday PA on the floor called and informed that the patient was witnessed to have rigors   Blood cultures were ordered and he was started on empiric antibiotics Vancomycin and Cefepime   Flow sheets reviewed and i did not see a fever recorded, his white cell count remained wnl   reviewed inflammatory markers and they were noted to have elevated his ferritin level increase so did his D dimer  He continues to require oxygen support he is currently on 8L which is less prior he was on nonrebreather and then 10L now he is nasal cannula 8L  Not clear why the patient had rigors, perhaps ongoing illness that is related to COVID-19? vs a bacterial process   Blood cultures ordered they are in process     Plan:  ·	Continue with empiric antibiotics Vancomycin and Cefepime pending culture results day 2   ·	follow up blood culture results   ·	Continue supportive care as per primary care team and consultants ie, prn oxygen, prn fluids and prn meds for fever

## 2020-04-18 NOTE — PROGRESS NOTE ADULT - SUBJECTIVE AND OBJECTIVE BOX
CARDIOLOGY     PROGRESS  NOTE   ________________________________________________    CHIEF COMPLAINT:Patient is a 74y old  Male who presents with a chief complaint of sob (18 Apr 2020 10:57)  no complain.  	  REVIEW OF SYSTEMS:  CONSTITUTIONAL: No fever, weight loss, or fatigue  EYES: No eye pain, visual disturbances, or discharge  ENT:  No difficulty hearing, tinnitus, vertigo; No sinus or throat pain  NECK: No pain or stiffness  RESPIRATORY: No cough, wheezing, chills or hemoptysis; +Shortness of Breath  CARDIOVASCULAR: No chest pain, palpitations, passing out, dizziness, or leg swelling  GASTROINTESTINAL: No abdominal or epigastric pain. No nausea, vomiting, or hematemesis; No diarrhea or constipation. No melena or hematochezia.  GENITOURINARY: No dysuria, frequency, hematuria, or incontinence  NEUROLOGICAL: No headaches, memory loss, loss of strength, numbness, or tremors  SKIN: No itching, burning, rashes, or lesions   LYMPH Nodes: No enlarged glands  ENDOCRINE: No heat or cold intolerance; No hair loss  MUSCULOSKELETAL: No joint pain or swelling; No muscle, back, or extremity pain  PSYCHIATRIC: No depression, anxiety, mood swings, or difficulty sleeping  HEME/LYMPH: No easy bruising, or bleeding gums  ALLERGY AND IMMUNOLOGIC: No hives or eczema	    [ ] All others negative	  [ ] Unable to obtain    PHYSICAL EXAM:  T(C): 36.7 (04-18-20 @ 09:22), Max: 37 (04-18-20 @ 05:31)  HR: 82 (04-18-20 @ 09:22) (63 - 82)  BP: 131/90 (04-18-20 @ 09:22) (131/90 - 150/87)  RR: 20 (04-18-20 @ 09:22) (20 - 22)  SpO2: 93% (04-18-20 @ 09:22) (93% - 96%)  Wt(kg): --  I&O's Summary    17 Apr 2020 07:01  -  18 Apr 2020 07:00  --------------------------------------------------------  IN: 1280 mL / OUT: 1325 mL / NET: -45 mL    18 Apr 2020 07:01  -  18 Apr 2020 12:21  --------------------------------------------------------  IN: 420 mL / OUT: 700 mL / NET: -280 mL        Appearance: Normal	  HEENT:   Normal oral mucosa, PERRL, EOMI	  Lymphatic: No lymphadenopathy  Cardiovascular: Normal S1 S2, No JVD, + murmurs, No edema  Respiratory: Lungs clear to auscultation	  Psychiatry: A & O x 3, Mood & affect appropriate  Gastrointestinal:  Soft, Non-tender, + BS	  Skin: No rashes, No ecchymoses, No cyanosis	  Neurologic: Non-focal  Extremities: Normal range of motion, No clubbing, cyanosis or edema  Vascular: Peripheral pulses palpable 2+ bilaterally    MEDICATIONS  (STANDING):  amLODIPine   Tablet 2.5 milliGRAM(s) Oral daily  cefepime   IVPB 1000 milliGRAM(s) IV Intermittent every 12 hours  enoxaparin Injectable 40 milliGRAM(s) SubCutaneous two times a day  simvastatin 20 milliGRAM(s) Oral at bedtime  vancomycin  IVPB 1000 milliGRAM(s) IV Intermittent every 12 hours  vancomycin  IVPB          TELEMETRY: 	    ECG:  	  RADIOLOGY:  OTHER: 	  	  LABS:	 	    CARDIAC MARKERS:                                13.9   9.01  )-----------( 225      ( 17 Apr 2020 22:03 )             43.8           proBNP:   Lipid Profile:   HgA1c:   TSH:   ·	Continue with empiric antibiotics Vancomycin and Cefepime pending culture results day 2   ·	follow up blood culture results   ·	Continue supportive care as per primary care team and consultants ie, prn oxygen, prn fluids and prn meds for fever         Assessment and plan  ---------------------------  75 yo male with a pmh of HLD, HTN, c/o sob worse with exertion, fevers, chills for 5 days.  denies chest pain. Pt states he is unable to do minimal activity without getting sob. denies any sick contacts.  quit smoking 15 years ago.   pt also with hx of increase cholesterol , pt was dx with COVID and started on steroid and Hydroxychloroquine  no chest pain, +NEGRON.  pt with covid + ON IV SOLUMEDRAL DAY 2/ O2 REQUIREMENT  DECREASE TO 10 L NC  agree with change Lovenox to BID  will decide on anakinra if worsened  check and fu markers  HTN dc lisinopril, will add calcium channel blocker  o2 sat 92 % on 8 L NC improving steroid dcd  ID noted on ?abx  bp is better controlled  fu lytes  fu markers

## 2020-04-18 NOTE — PROGRESS NOTE ADULT - SUBJECTIVE AND OBJECTIVE BOX
CC: f/u for COVID-19     I received a call yesterday from the PA on the floor and reported to me that the patient was observed to have rigors     REVIEW OF SYSTEMS:  All other review of systems negative (Comprehensive ROS)    Antimicrobials Day #      Other Medications Reviewed    Vital Signs Last 24 Hrs  T(C): 36.7 (18 Apr 2020 09:22), Max: 37 (18 Apr 2020 05:31)  T(F): 98 (18 Apr 2020 09:22), Max: 98.6 (18 Apr 2020 05:31)  HR: 82 (18 Apr 2020 09:22) (63 - 82)  BP: 131/90 (18 Apr 2020 09:22) (131/90 - 150/87)  BP(mean): --  RR: 20 (18 Apr 2020 09:22) (20 - 22)  SpO2: 93% (18 Apr 2020 09:22) (93% - 96%)  PHYSICAL EXAM:  GEN: NAD   HEENT; he has nasal cannula in place   CHEST/LUNG:   no     rales,     no,    rhonchi  HEART: Regular rate and rhythm;         murmur  ABDOMEN: Soft, Nontender, ;   EXTREMITIES:   no     edema  NEUROLOGY:  alert        LAB RESULTS:                        13.9   9.01  )-----------( 225      ( 17 Apr 2020 22:03 )             43.8                04-16    134<L>  |  103  |  28<H>  ----------------------------<  358<H>  5.0   |  17<L>  |  0.84    Ca    8.8      16 Apr 2020 11:34        Ferritin, Serum: 1888 ng/mL (04.10.20 @ 13:12)  Lactate Dehydrogenase, Serum: 492 U/L (04.10.20 @ 10:57)  D-Dimer Assay, Quantitative: 500: D-Dimer result less than 230 ng/mL DDU correlates with the absence  of thrombosis in a patient with a low and moderate       pre-test probability of thrombosis.  1 DDU is approximately equal to  2 ng/mL FEU (previous units). ng/mL DDU (04.10.20 @ 10:57)        MICROBIOLOGY:  RECENT CULTURES:      RADIOLOGY REVIEWED:        < from: Xray Chest 1 View-PORTABLE IMMEDIATE (04.10.20 @ 11:17) >    EXAM:  XR CHEST PORTABLE IMMED 1V                            PROCEDURE DATE:  04/10/2020            INTERPRETATION:  Indication: Shortness of breath, cough, fever.    Technique: Single portable view of the chest.    Comparison: None.    Findings: The cardiac silhouette is normal in size. There are vague patchy airspace opacities in the peripheral aspects of the bilateral lungs, more pronounced in the left lung. There are no pleural effusions. The hilar mediastinal structures appear unremarkable.    Impression: Vague patchy airspace opacities in the peripheral aspects of the bilateral lungs, concerning for viral pneumonia such as COVID.     < end of copied text >

## 2020-04-18 NOTE — PROGRESS NOTE ADULT - SUBJECTIVE AND OBJECTIVE BOX
hypoxia.    REVIEW OF SYSTEMS:  GEN: no fever,    no chills  RESP: SOB,   no cough  CVS: no chest pain,   no palpitations  GI: no abdominal pain,   no nausea,   no vomiting,   no constipation,   no diarrhea  : no dysuria,   no frequency  NEURO: no headache,   no dizziness  PSYCH: no depression,   not anxious  Derm : no rash    MEDICATIONS  (STANDING):  amLODIPine   Tablet 2.5 milliGRAM(s) Oral daily  cefepime   IVPB 1000 milliGRAM(s) IV Intermittent every 12 hours  enoxaparin Injectable 40 milliGRAM(s) SubCutaneous two times a day  simvastatin 20 milliGRAM(s) Oral at bedtime  vancomycin  IVPB 1000 milliGRAM(s) IV Intermittent every 12 hours  vancomycin  IVPB        MEDICATIONS  (PRN):  acetaminophen   Tablet .. 650 milliGRAM(s) Oral every 6 hours PRN Mild Pain (1 - 3), Moderate Pain (4 - 6)  benzonatate 100 milliGRAM(s) Oral three times a day PRN Cough  sodium chloride 0.65% Nasal 1 Spray(s) Both Nostrils two times a day PRN Nasal Congestion      Vital Signs Last 24 Hrs  T(C): 36.7 (18 Apr 2020 09:22), Max: 37 (18 Apr 2020 05:31)  T(F): 98 (18 Apr 2020 09:22), Max: 98.6 (18 Apr 2020 05:31)  HR: 82 (18 Apr 2020 09:22) (63 - 82)  BP: 131/90 (18 Apr 2020 09:22) (131/90 - 150/87)  BP(mean): --  RR: 20 (18 Apr 2020 09:22) (20 - 22)  SpO2: 93% (18 Apr 2020 09:22) (93% - 96%)  CAPILLARY BLOOD GLUCOSE        I&O's Summary    17 Apr 2020 07:01  -  18 Apr 2020 07:00  --------------------------------------------------------  IN: 1280 mL / OUT: 1325 mL / NET: -45 mL    18 Apr 2020 07:01  -  18 Apr 2020 10:35  --------------------------------------------------------  IN: 0 mL / OUT: 300 mL / NET: -300 mL        PHYSICAL EXAM:  HEAD:  Atraumatic, Normocephalic  NECK: Supple, No   JVD  CHEST/LUNG:   no     rales,     no,    rhonchi  HEART: Regular rate and rhythm;         murmur  ABDOMEN: Soft, Nontender, ;   EXTREMITIES:   no     edema  NEUROLOGY:  alert    LABS:                        13.9   9.01  )-----------( 225      ( 17 Apr 2020 22:03 )             43.8     04-16    134<L>  |  103  |  28<H>  ----------------------------<  358<H>  5.0   |  17<L>  |  0.84    Ca    8.8      16 Apr 2020 11:34                              Consultant(s) Notes Reviewed:      Care Discussed with Consultants/Other Providers:

## 2020-04-19 LAB
ANION GAP SERPL CALC-SCNC: 15 MMOL/L — SIGNIFICANT CHANGE UP (ref 5–17)
BUN SERPL-MCNC: 19 MG/DL — SIGNIFICANT CHANGE UP (ref 7–23)
CALCIUM SERPL-MCNC: 9.1 MG/DL — SIGNIFICANT CHANGE UP (ref 8.4–10.5)
CHLORIDE SERPL-SCNC: 100 MMOL/L — SIGNIFICANT CHANGE UP (ref 96–108)
CO2 SERPL-SCNC: 21 MMOL/L — LOW (ref 22–31)
CREAT SERPL-MCNC: 0.9 MG/DL — SIGNIFICANT CHANGE UP (ref 0.5–1.3)
CRP SERPL-MCNC: 2.94 MG/DL — HIGH (ref 0–0.4)
D DIMER BLD IA.RAPID-MCNC: 332 NG/ML DDU — HIGH
FERRITIN SERPL-MCNC: 1233 NG/ML — HIGH (ref 30–400)
GLUCOSE SERPL-MCNC: 114 MG/DL — HIGH (ref 70–99)
HCT VFR BLD CALC: 45.4 % — SIGNIFICANT CHANGE UP (ref 39–50)
HGB BLD-MCNC: 14.8 G/DL — SIGNIFICANT CHANGE UP (ref 13–17)
LDH SERPL L TO P-CCNC: 280 U/L — HIGH (ref 50–242)
MCHC RBC-ENTMCNC: 28.9 PG — SIGNIFICANT CHANGE UP (ref 27–34)
MCHC RBC-ENTMCNC: 32.6 GM/DL — SIGNIFICANT CHANGE UP (ref 32–36)
MCV RBC AUTO: 88.7 FL — SIGNIFICANT CHANGE UP (ref 80–100)
NRBC # BLD: 0 /100 WBCS — SIGNIFICANT CHANGE UP (ref 0–0)
PLATELET # BLD AUTO: 186 K/UL — SIGNIFICANT CHANGE UP (ref 150–400)
POTASSIUM SERPL-MCNC: 4.4 MMOL/L — SIGNIFICANT CHANGE UP (ref 3.5–5.3)
POTASSIUM SERPL-SCNC: 4.4 MMOL/L — SIGNIFICANT CHANGE UP (ref 3.5–5.3)
RBC # BLD: 5.12 M/UL — SIGNIFICANT CHANGE UP (ref 4.2–5.8)
RBC # FLD: 14.5 % — SIGNIFICANT CHANGE UP (ref 10.3–14.5)
SODIUM SERPL-SCNC: 136 MMOL/L — SIGNIFICANT CHANGE UP (ref 135–145)
WBC # BLD: 7.43 K/UL — SIGNIFICANT CHANGE UP (ref 3.8–10.5)
WBC # FLD AUTO: 7.43 K/UL — SIGNIFICANT CHANGE UP (ref 3.8–10.5)

## 2020-04-19 PROCEDURE — 99232 SBSQ HOSP IP/OBS MODERATE 35: CPT | Mod: CS

## 2020-04-19 RX ADMIN — SIMVASTATIN 20 MILLIGRAM(S): 20 TABLET, FILM COATED ORAL at 21:19

## 2020-04-19 RX ADMIN — ENOXAPARIN SODIUM 40 MILLIGRAM(S): 100 INJECTION SUBCUTANEOUS at 05:41

## 2020-04-19 RX ADMIN — AMLODIPINE BESYLATE 2.5 MILLIGRAM(S): 2.5 TABLET ORAL at 05:41

## 2020-04-19 RX ADMIN — ENOXAPARIN SODIUM 40 MILLIGRAM(S): 100 INJECTION SUBCUTANEOUS at 16:36

## 2020-04-19 NOTE — PROGRESS NOTE ADULT - SUBJECTIVE AND OBJECTIVE BOX
still   hypoxic  REVIEW OF SYSTEMS:  GEN: no fever,    no chills  RESP: no SOB,   no cough  CVS: no chest pain,   no palpitations  GI: no abdominal pain,   no nausea,   no vomiting,   no constipation,   no diarrhea  : no dysuria,   no frequency  NEURO: no headache,   no dizziness  PSYCH: no depression,   not anxious  Derm : no rash    MEDICATIONS  (STANDING):  amLODIPine   Tablet 2.5 milliGRAM(s) Oral daily  enoxaparin Injectable 40 milliGRAM(s) SubCutaneous two times a day  simvastatin 20 milliGRAM(s) Oral at bedtime    MEDICATIONS  (PRN):  acetaminophen   Tablet .. 650 milliGRAM(s) Oral every 6 hours PRN Mild Pain (1 - 3), Moderate Pain (4 - 6)  benzonatate 100 milliGRAM(s) Oral three times a day PRN Cough  sodium chloride 0.65% Nasal 1 Spray(s) Both Nostrils two times a day PRN Nasal Congestion      Vital Signs Last 24 Hrs  T(C): 36.7 (19 Apr 2020 08:33), Max: 37.5 (18 Apr 2020 16:00)  T(F): 98 (19 Apr 2020 08:33), Max: 99.5 (18 Apr 2020 16:00)  HR: 87 (19 Apr 2020 08:33) (67 - 87)  BP: 116/76 (19 Apr 2020 08:33) (116/76 - 134/90)  BP(mean): --  RR: 20 (19 Apr 2020 08:33) (20 - 22)  SpO2: 94% (19 Apr 2020 08:33) (92% - 95%)  CAPILLARY BLOOD GLUCOSE        I&O's Summary    18 Apr 2020 07:01  -  19 Apr 2020 07:00  --------------------------------------------------------  IN: 1370 mL / OUT: 3050 mL / NET: -1680 mL    19 Apr 2020 07:01  -  19 Apr 2020 10:36  --------------------------------------------------------  IN: 0 mL / OUT: 150 mL / NET: -150 mL        PHYSICAL EXAM:  HEAD:  Atraumatic, Normocephalic  NECK: Supple, No   JVD  CHEST/LUNG:   no     rales,     no,    rhonchi  HEART: Regular rate and rhythm;         murmur  ABDOMEN: Soft, Nontender, ;   EXTREMITIES:     no   edema  NEUROLOGY:  alert    LABS:                        14.8   7.43  )-----------( 186      ( 19 Apr 2020 07:22 )             45.4     04-19    136  |  100  |  19  ----------------------------<  114<H>  4.4   |  21<L>  |  0.90    Ca    9.1      19 Apr 2020 07:23                              Consultant(s) Notes Reviewed:      Care Discussed with Consultants/Other Providers:

## 2020-04-19 NOTE — PROGRESS NOTE ADULT - ASSESSMENT
75 yo male      with a pmh of   HLD,  HTN,  ex   smoker,  quit  15  yrs          c/o sob worse with exertion, fevers, chills for 5 days. /  IN  eR ,mild  tachycardia.,   hypoxia  to 90, on oxygen   /  sb[p   of 120 no  cp     Pt states he is unable to do minimal activity without getting sob. denies any sick contacts.   pt with normal  wbc/ mild  hyponatremia.    Covid, Positive, on PLAQUENIL,   s/p   IV STEROID   pt  has  elevated  D dimer/ PTT/  LDH/ fibrinogen  ID eval dr Casey,   cxr/ with opacities.  b/l   on lovenox , bid/  Wt is  90 kg    bp  meds  titrated  and   sbp  has improved  continue  supportive care/  hypoxia. on oxygen/  laquenil  completed  on 4/ 15  and   s/p    iv steroid/    still requiring    high  O2  requirements,   continue   current  care/  on  8  liters  now  does  not  need  daily labs   cefepime   stopped/  bllood  c/s, negative   pt  afebrile normal  wbc     d/c  next  week  on home   oxygen       < from: Xray Chest 1 View-PORTABLE IMMEDIATE (04.10.20 @ 11:17) >  Impression: Vague patchy airspace opacities in the peripheral aspects of the bilateral lungs, concerning for viral pneumonia such as COVID.   < end of copied text >

## 2020-04-19 NOTE — PROGRESS NOTE ADULT - SUBJECTIVE AND OBJECTIVE BOX
CARDIOLOGY     PROGRESS  NOTE   ________________________________________________    CHIEF COMPLAINT:Patient is a 74y old  Male who presents with a chief complaint of sob (19 Apr 2020 11:11)  no complain.  	  REVIEW OF SYSTEMS:  CONSTITUTIONAL: No fever, weight loss, or fatigue  EYES: No eye pain, visual disturbances, or discharge  ENT:  No difficulty hearing, tinnitus, vertigo; No sinus or throat pain  NECK: No pain or stiffness  RESPIRATORY: No cough, wheezing, chills or hemoptysis; + Shortness of Breath  CARDIOVASCULAR: No chest pain, palpitations, passing out, dizziness, or leg swelling  GASTROINTESTINAL: No abdominal or epigastric pain. No nausea, vomiting, or hematemesis; No diarrhea or constipation. No melena or hematochezia.  GENITOURINARY: No dysuria, frequency, hematuria, or incontinence  NEUROLOGICAL: No headaches, memory loss, loss of strength, numbness, or tremors  SKIN: No itching, burning, rashes, or lesions   LYMPH Nodes: No enlarged glands  ENDOCRINE: No heat or cold intolerance; No hair loss  MUSCULOSKELETAL: No joint pain or swelling; No muscle, back, or extremity pain  PSYCHIATRIC: No depression, anxiety, mood swings, or difficulty sleeping  HEME/LYMPH: No easy bruising, or bleeding gums  ALLERGY AND IMMUNOLOGIC: No hives or eczema	    [ ] All others negative	  [ ] Unable to obtain    PHYSICAL EXAM:  T(C): 36.7 (04-19-20 @ 08:33), Max: 37.5 (04-18-20 @ 16:00)  HR: 87 (04-19-20 @ 08:33) (67 - 87)  BP: 116/76 (04-19-20 @ 08:33) (116/76 - 134/90)  RR: 20 (04-19-20 @ 08:33) (20 - 22)  SpO2: 94% (04-19-20 @ 08:33) (92% - 95%)  Wt(kg): --  I&O's Summary    18 Apr 2020 07:01  -  19 Apr 2020 07:00  --------------------------------------------------------  IN: 1370 mL / OUT: 3050 mL / NET: -1680 mL    19 Apr 2020 07:01  -  19 Apr 2020 11:34  --------------------------------------------------------  IN: 0 mL / OUT: 150 mL / NET: -150 mL        Appearance: Normal	  HEENT:   Normal oral mucosa, PERRL, EOMI	  Lymphatic: No lymphadenopathy  Cardiovascular: Normal S1 S2, No JVD,+ murmurs, No edema  Respiratory: Lungs clear to auscultation	  Psychiatry: A & O x 3, Mood & affect appropriate  Gastrointestinal:  Soft, Non-tender, + BS	  Skin: No rashes, No ecchymoses, No cyanosis	  Neurologic: Non-focal  Extremities: Normal range of motion, No clubbing, cyanosis or edema  Vascular: Peripheral pulses palpable 2+ bilaterally    MEDICATIONS  (STANDING):  amLODIPine   Tablet 2.5 milliGRAM(s) Oral daily  enoxaparin Injectable 40 milliGRAM(s) SubCutaneous two times a day  simvastatin 20 milliGRAM(s) Oral at bedtime      TELEMETRY: 	    ECG:  	  RADIOLOGY:  OTHER: 	  	  LABS:	 	    CARDIAC MARKERS:                                14.8   7.43  )-----------( 186      ( 19 Apr 2020 07:22 )             45.4     04-19    136  |  100  |  19  ----------------------------<  114<H>  4.4   |  21<L>  |  0.90    Ca    9.1      19 Apr 2020 07:23      proBNP:   Lipid Profile:   HgA1c:   TSH:     Culture - Blood (04.18.20 @ 01:47)    Specimen Source: .Blood Blood-Venous    Culture Results:   No growth to date.  < from: 12 Lead ECG (04.10.20 @ 11:58) >  Ventricular Rate 93 BPM    Atrial Rate 93 BPM    P-R Interval 164 ms    QRS Duration 80 ms    Q-T Interval 386 ms    QTC Calculation(Smileyet) 479 ms    P Axis 59 degrees    R Axis -62 degrees    T Axis 55 degrees    Diagnosis Line SINUS RHYTHM WITH PREMATURE ATRIAL COMPLEXES  POSSIBLE LEFT ATRIAL ENLARGEMENT  LEFT AXIS DEVIATION  ABNORMAL ECG    < end of copied text >        Assessment and plan  ---------------------------  73 yo male with a pmh of HLD, HTN, c/o sob worse with exertion, fevers, chills for 5 days.  denies chest pain. Pt states he is unable to do minimal activity without getting sob. denies any sick contacts.  quit smoking 15 years ago.   pt also with hx of increase cholesterol , pt was dx with COVID and started on steroid and Hydroxychloroquine  no chest pain, +NEGRON.  pt with covid + ON IV SOLUMEDRAL DAY 2/ O2 REQUIREMENT  DECREASE TO 10 L NC  agree with change Lovenox to BID  will decide on anakinra if worsened  check and fu markers  HTN dc lisinopril, will add calcium channel blocker  o2 sat 92 % on 8 L NC improving steroid dcd  ID noted on ?abx  bp is better controlled  fu lytes  fu markers order for tomorrow  decrease o2 requirement

## 2020-04-19 NOTE — PROGRESS NOTE ADULT - ASSESSMENT
74 year old male who was diagnosed with COVID-19 viral pneumonia   He presents with cough, shortness of breath and hypoxia   He continues to require oxgen reviewed flow sheets and it appears that the oxygen was titrated down 10-->8  He has completed his full course of Plaquenil as planned   Events noted from yesterday PA on the floor called and informed that the patient was witnessed to have rigors   Blood cultures were ordered and he was started on empiric antibiotics Vancomycin and Cefepime   Flow sheets reviewed and i did not see a fever recorded, his white cell count remained wnl   reviewed inflammatory markers and they were noted to have elevated his ferritin level increase so did his D dimer  Not clear why the patient had rigors, perhaps ongoing illness that is related to COVID-19? vs a bacterial process   Blood cultures reviewed and are reported as no growth to date   oxygen requirement has been tapered down he is now on 6L   CBC reviewed and his wbc are wnl   He had been started on empiric antibiotics Vanco and Cefepime because he had episode of rigors, he has no fevers, white count is in wnl blood cx are no growth to date     Plan:  ·	Reviewed orders, his Cefepime and Vancomycin have been discontinued since blood cultures are negative by medical team. Monitor off antibiotics   ·	Continue supportive care as per primary care team and consultants ie, prn oxygen, prn fluids and prn meds for fever

## 2020-04-19 NOTE — PROGRESS NOTE ADULT - SUBJECTIVE AND OBJECTIVE BOX
CC: f/u for COVID-19     He continues to require oxygen support on 6L of oxygen    REVIEW OF SYSTEMS:  All other review of systems negative (Comprehensive ROS)    Antimicrobials Day #      Other Medications Reviewed    Vital Signs Last 24 Hrs  T(C): 36.7 (19 Apr 2020 08:33), Max: 37.5 (18 Apr 2020 16:00)  T(F): 98 (19 Apr 2020 08:33), Max: 99.5 (18 Apr 2020 16:00)  HR: 87 (19 Apr 2020 08:33) (67 - 87)  BP: 116/76 (19 Apr 2020 08:33) (116/76 - 134/90)  BP(mean): --  RR: 20 (19 Apr 2020 08:33) (20 - 22)  SpO2: 94% (19 Apr 2020 08:33) (92% - 95%)  PHYSICAL EXAM:  GEN: NAD   HEENT; he has nasal cannula in place   CHEST/LUNG:   no     rales,     no,    rhonchi  HEART: Regular rate and rhythm;         murmur  ABDOMEN: Soft, Nontender, ;   EXTREMITIES:   no     edema  NEUROLOGY:  alert        LAB RESULTS:                        14.8   7.43  )-----------( 186      ( 19 Apr 2020 07:22 )             45.4                           13.9   9.01  )-----------( 225      ( 17 Apr 2020 22:03 )             43.8                04-19    136  |  100  |  19  ----------------------------<  114<H>  4.4   |  21<L>  |  0.90    Ca    9.1      19 Apr 2020 07:23          Ferritin, Serum: 1888 ng/mL (04.10.20 @ 13:12)  Lactate Dehydrogenase, Serum: 492 U/L (04.10.20 @ 10:57)  D-Dimer Assay, Quantitative: 500: D-Dimer result less than 230 ng/mL DDU correlates with the absence  of thrombosis in a patient with a low and moderate       pre-test probability of thrombosis.  1 DDU is approximately equal to  2 ng/mL FEU (previous units). ng/mL DDU (04.10.20 @ 10:57)        MICROBIOLOGY:  RECENT CULTURES:      RADIOLOGY REVIEWED:        < from: Xray Chest 1 View-PORTABLE IMMEDIATE (04.10.20 @ 11:17) >    EXAM:  XR CHEST PORTABLE IMMED 1V                            PROCEDURE DATE:  04/10/2020            INTERPRETATION:  Indication: Shortness of breath, cough, fever.    Technique: Single portable view of the chest.    Comparison: None.    Findings: The cardiac silhouette is normal in size. There are vague patchy airspace opacities in the peripheral aspects of the bilateral lungs, more pronounced in the left lung. There are no pleural effusions. The hilar mediastinal structures appear unremarkable.    Impression: Vague patchy airspace opacities in the peripheral aspects of the bilateral lungs, concerning for viral pneumonia such as COVID.     < end of copied text >

## 2020-04-20 ENCOUNTER — TRANSCRIPTION ENCOUNTER (OUTPATIENT)
Age: 75
End: 2020-04-20

## 2020-04-20 PROCEDURE — 99232 SBSQ HOSP IP/OBS MODERATE 35: CPT | Mod: CS

## 2020-04-20 RX ORDER — ACETAMINOPHEN 500 MG
2 TABLET ORAL
Qty: 0 | Refills: 0 | DISCHARGE
Start: 2020-04-20

## 2020-04-20 RX ADMIN — SIMVASTATIN 20 MILLIGRAM(S): 20 TABLET, FILM COATED ORAL at 22:46

## 2020-04-20 RX ADMIN — ENOXAPARIN SODIUM 40 MILLIGRAM(S): 100 INJECTION SUBCUTANEOUS at 18:37

## 2020-04-20 RX ADMIN — ENOXAPARIN SODIUM 40 MILLIGRAM(S): 100 INJECTION SUBCUTANEOUS at 04:49

## 2020-04-20 RX ADMIN — AMLODIPINE BESYLATE 2.5 MILLIGRAM(S): 2.5 TABLET ORAL at 04:49

## 2020-04-20 NOTE — PROGRESS NOTE ADULT - SUBJECTIVE AND OBJECTIVE BOX
CC: f/u for covid    Patient reports  he is too weak to go home  REVIEW OF SYSTEMS:  All other review of systems negative (Comprehensive ROS)    Antimicrobials Day #  :    Other Medications Reviewed    T(F): 98.9 (04-20-20 @ 17:40), Max: 98.9 (04-20-20 @ 17:40)  HR: 88 (04-20-20 @ 17:40)  BP: 110/76 (04-20-20 @ 17:40)  RR: 20 (04-20-20 @ 17:40)  SpO2: 97% (04-20-20 @ 17:40)  Wt(kg): --    PHYSICAL EXAM:  General: alert, no acute distress  Eyes:  anicteric, no conjunctival injection, no discharge  Oropharynx: no lesions or injection 	  Neck: supple, without adenopathy  Lungs: clear to auscultation  Heart: regular rate and rhythm; no murmur, rubs or gallops  Abdomen: soft, nondistended, nontender, without mass or organomegaly  Skin: no lesions  Extremities: no clubbing, cyanosis, or edema  Neurologic: alert, oriented, moves all extremities    LAB RESULTS:                        14.8   7.43  )-----------( 186      ( 19 Apr 2020 07:22 )             45.4     04-19    136  |  100  |  19  ----------------------------<  114<H>  4.4   |  21<L>  |  0.90    Ca    9.1      19 Apr 2020 07:23          MICROBIOLOGY:  RECENT CULTURES:  04-18 @ 01:47 .Blood Blood-Venous     No growth to date.          RADIOLOGY REVIEWED:      < from: Xray Chest 1 View-PORTABLE IMMEDIATE (04.10.20 @ 11:17) >  Impression: Vague patchy airspace opacities in the peripheral aspects of the bilateral lungs, concerning for viral pneumonia such as COVID.       < end of copied text >          Assessment: covid infection, finished plaquenil. had possible rigors but cultures negative and no fever. no active infection at present warranting furher antibiotics. No evidence of cytokine storm.     Plan:  supportive care  d/c planning

## 2020-04-20 NOTE — DISCHARGE NOTE PROVIDER - NSDCMRMEDTOKEN_GEN_ALL_CORE_FT
lisinopril 10 mg oral tablet: 1 tab(s) orally once a day  simvastatin 20 mg oral tablet: 1 tab(s) orally once a day (at bedtime) acetaminophen 325 mg oral tablet: 2 tab(s) orally every 6 hours, As needed,   lisinopril 10 mg oral tablet: 1 tab(s) orally once a day  simvastatin 20 mg oral tablet: 1 tab(s) orally once a day (at bedtime)

## 2020-04-20 NOTE — DISCHARGE NOTE PROVIDER - NSDCACTIVITY_GEN_ALL_CORE
Walking - Outdoors allowed/Stairs allowed/No heavy lifting/straining/Do not drive or operate machinery/Walking - Indoors allowed

## 2020-04-20 NOTE — DISCHARGE NOTE PROVIDER - HOSPITAL COURSE
Pt admitted 4/10 and was diagnosed w/ COVID-19 virus. Pt was also treated for a bacterial PNA w/ IV Abx. Pt given Plaquenil and Solumedrol. Pt continued to improve. Pt now saturating on room air @ 93%.     4/20: As per medicine: Pt is medically cleared and stable for d/c -> home         Pt to follow up with his PMD Dr. Gonzalo Martin upon discharge.                                14.8     7.43  )-----------( 186      ( 19 Apr 2020 07:22 )               45.4         from: Xray Chest 1 View-PORTABLE IMMEDIATE (04.10.20 @ 11:17)         Impression: Vague patchy airspace opacities in the peripheral aspects of the bilateral lungs, concerning for viral pneumonia such as COVID.

## 2020-04-20 NOTE — PROGRESS NOTE ADULT - SUBJECTIVE AND OBJECTIVE BOX
on   2liters  REVIEW OF SYSTEMS:  GEN: no fever,    no chills  RESP: no SOB,   no cough  CVS: no chest pain,   no palpitations  GI: no abdominal pain,   no nausea,   no vomiting,   no constipation,   no diarrhea  : no dysuria,   no frequency  NEURO: no headache,   no dizziness  PSYCH: no depression,   not anxious  Derm : no rash    MEDICATIONS  (STANDING):  amLODIPine   Tablet 2.5 milliGRAM(s) Oral daily  enoxaparin Injectable 40 milliGRAM(s) SubCutaneous two times a day  simvastatin 20 milliGRAM(s) Oral at bedtime    MEDICATIONS  (PRN):  acetaminophen   Tablet .. 650 milliGRAM(s) Oral every 6 hours PRN Mild Pain (1 - 3), Moderate Pain (4 - 6)  benzonatate 100 milliGRAM(s) Oral three times a day PRN Cough  sodium chloride 0.65% Nasal 1 Spray(s) Both Nostrils two times a day PRN Nasal Congestion      Vital Signs Last 24 Hrs  T(C): 36.8 (20 Apr 2020 08:00), Max: 36.8 (19 Apr 2020 16:16)  T(F): 98.2 (20 Apr 2020 08:00), Max: 98.3 (19 Apr 2020 16:16)  HR: 89 (20 Apr 2020 08:00) (83 - 90)  BP: 110/74 (20 Apr 2020 08:00) (108/71 - 123/85)  BP(mean): --  RR: 20 (20 Apr 2020 08:00) (20 - 20)  SpO2: 95% (20 Apr 2020 08:00) (93% - 95%)  CAPILLARY BLOOD GLUCOSE        I&O's Summary    19 Apr 2020 07:01  -  20 Apr 2020 07:00  --------------------------------------------------------  IN: 580 mL / OUT: 550 mL / NET: 30 mL        PHYSICAL EXAM:  HEAD:  Atraumatic, Normocephalic  NECK: Supple, No   JVD  CHEST/LUNG:   no     rales,     no,    rhonchi  HEART: Regular rate and rhythm;         murmur  ABDOMEN: Soft, Nontender, ;   EXTREMITIES:   no     edema  NEUROLOGY:  alert    LABS:                        14.8   7.43  )-----------( 186      ( 19 Apr 2020 07:22 )             45.4     04-19    136  |  100  |  19  ----------------------------<  114<H>  4.4   |  21<L>  |  0.90    Ca    9.1      19 Apr 2020 07:23                              Consultant(s) Notes Reviewed:      Care Discussed with Consultants/Other Providers: on   2liters/  spoke  with pt/   REVIEW OF SYSTEMS:  GEN: no fever,    no chills  RESP: no SOB,   no cough  CVS: no chest pain,   no palpitations  GI: no abdominal pain,   no nausea,   no vomiting,   no constipation,   no diarrhea  : no dysuria,   no frequency  NEURO: no headache,   no dizziness  PSYCH: no depression,   not anxious  Derm : no rash    MEDICATIONS  (STANDING):  amLODIPine   Tablet 2.5 milliGRAM(s) Oral daily  enoxaparin Injectable 40 milliGRAM(s) SubCutaneous two times a day  simvastatin 20 milliGRAM(s) Oral at bedtime    MEDICATIONS  (PRN):  acetaminophen   Tablet .. 650 milliGRAM(s) Oral every 6 hours PRN Mild Pain (1 - 3), Moderate Pain (4 - 6)  benzonatate 100 milliGRAM(s) Oral three times a day PRN Cough  sodium chloride 0.65% Nasal 1 Spray(s) Both Nostrils two times a day PRN Nasal Congestion      Vital Signs Last 24 Hrs  T(C): 36.8 (20 Apr 2020 08:00), Max: 36.8 (19 Apr 2020 16:16)  T(F): 98.2 (20 Apr 2020 08:00), Max: 98.3 (19 Apr 2020 16:16)  HR: 89 (20 Apr 2020 08:00) (83 - 90)  BP: 110/74 (20 Apr 2020 08:00) (108/71 - 123/85)  BP(mean): --  RR: 20 (20 Apr 2020 08:00) (20 - 20)  SpO2: 95% (20 Apr 2020 08:00) (93% - 95%)  CAPILLARY BLOOD GLUCOSE        I&O's Summary    19 Apr 2020 07:01  -  20 Apr 2020 07:00  --------------------------------------------------------  IN: 580 mL / OUT: 550 mL / NET: 30 mL        PHYSICAL EXAM:  HEAD:  Atraumatic, Normocephalic  NECK: Supple, No   JVD  CHEST/LUNG:   no     rales,     no,    rhonchi  HEART: Regular rate and rhythm;         murmur  ABDOMEN: Soft, Nontender, ;   EXTREMITIES:   no     edema  NEUROLOGY:  alert    LABS:                        14.8   7.43  )-----------( 186      ( 19 Apr 2020 07:22 )             45.4     04-19    136  |  100  |  19  ----------------------------<  114<H>  4.4   |  21<L>  |  0.90    Ca    9.1      19 Apr 2020 07:23                              Consultant(s) Notes Reviewed:      Care Discussed with Consultants/Other Providers:

## 2020-04-20 NOTE — CHART NOTE - NSCHARTNOTEFT_GEN_A_CORE
Note:    Spoke at length with patient's son, Guerrero @ [phone] & Daughter Chasity @ [phone] re: Discharge plans  Patent lives @ home by himself. Both understand that patient is now stable for d/c -> home and the need to get patients back into the safety of their own homes  However, neither the son nor the Daughter can  their father today.  Today, they will clean his apartment and prepare food for him to complete the remainder of his self-imposed quarantine post-discharge  Discharge Planning also aware: a family pick-up or ambulette will be arranged for TOMORROW Am (4/20)  Patient and family in agreement w/ above plan      ***See Above  Jhonny BATRES  Orthopedics  B: [phone]  S: [phone]

## 2020-04-20 NOTE — PHYSICAL THERAPY INITIAL EVALUATION ADULT - PERTINENT HX OF CURRENT PROBLEM, REHAB EVAL
75 y/o M pt w/ the below PMH presented w/ c/o SOB worse with exertion, fevers, chills for 5 days presented to the ED on 04/10 was found to be tachycardic and hypoxic in setting of COIV-19 infection, s/p course of steroid and hydroxychloroquine. Chest xray w/ vague patchy airspace opacities in the peripheral aspects of the bilateral lungs, concerning for viral pneumonia such as COVID.

## 2020-04-20 NOTE — DISCHARGE NOTE PROVIDER - NSDCHHATTENDCERT_GEN_ALL_CORE
[FreeTextEntry8] : Pt 37 y/o present complaint of sore throat since yesterday.\par Wife and kids pos for [employer] 
My signature below certifies that the above stated patient is homebound and upon completion of the Face-To-Face encounter, has the need for intermittent skilled nursing, physical therapy and/or speech or occupational therapy services in their home for their current diagnosis as outlined in their initial plan of care. These services will continue to be monitored by myself or another physician.

## 2020-04-20 NOTE — DISCHARGE NOTE PROVIDER - INSTRUCTIONS
Resume regular diet  Please add EXTRA proteins, fruits, and vegetable to your diet for the next 2 weeks to help regain your

## 2020-04-20 NOTE — DISCHARGE NOTE PROVIDER - NSDCCPCAREPLAN_GEN_ALL_CORE_FT
PRINCIPAL DISCHARGE DIAGNOSIS  Diagnosis: Suspected 2019 novel coronavirus infection  Assessment and Plan of Treatment: Viral Pneumonia

## 2020-04-20 NOTE — DISCHARGE NOTE PROVIDER - NSDCFUADDAPPT_GEN_ALL_CORE_FT
Follow up with your private internist Dr Gonzalo Martin @ [phone] weeks re: general checkup (and possible medication adjustment) If you can NOT contact your MD, please call [phone]

## 2020-04-20 NOTE — PROGRESS NOTE ADULT - ASSESSMENT
73 yo male      with a pmh of   HLD,  HTN,  ex   smoker,  quit  15  yrs          c/o sob worse with exertion, fevers, chills for 5 days. /  IN  eR ,mild  tachycardia.,   hypoxia  to 90, on oxygen   /  sb[p   of 120 no  cp     Pt states he is unable to do minimal activity without getting sob. denies any sick contacts.   pt with normal  wbc/ mild  hyponatremia.    Covid, Positive, on PLAQUENIL,   s/p   IV STEROID   pt  has  elevated  D dimer/ PTT/  LDH/ fibrinogen  ID eval dr Casey,   cxr/ with opacities.  b/l   on lovenox , bid/  Wt is  90 kg    bp  meds  titrated  and   sbp  has improved  continue  supportive care/  hypoxia. on oxygen/  laquenil  completed  on 4/ 15  and   s/p    iv steroid/    still requiring    high  O2  requirements,   continue   current  care/  on  8  liters  now  does  not  need  daily labs   cefepime   stopped/  bllood  c/s, negative   pt  afebrile normal  wbc     on  2 liters n/c  plan,  d/c  home  with oxygen  pmd, 1  week       < from: Xray Chest 1 View-PORTABLE IMMEDIATE (04.10.20 @ 11:17) >  Impression: Vague patchy airspace opacities in the peripheral aspects of the bilateral lungs, concerning for viral pneumonia such as COVID.   < end of copied text > 73 yo male      with a pmh of   HLD,  HTN,  ex   smoker,  quit  15  yrs          c/o sob worse with exertion, fevers, chills for 5 days. /  IN  eR ,mild  tachycardia.,   hypoxia  to 90, on oxygen   /  sb[p   of 120 no  cp     Pt states he is unable to do minimal activity without getting sob. denies any sick contacts.   pt with normal  wbc/ mild  hyponatremia.    Covid, Positive, on PLAQUENIL,   s/p   IV STEROID   pt  has  elevated  D dimer/ PTT/  LDH/ fibrinogen  ID eval dr Casey,   cxr/ with opacities.  b/l   on lovenox , bid/  Wt is  90 kg    bp  meds  titrated  and   sbp  has improved  continue  supportive care/  hypoxia. on oxygen/  laquenil  completed  on 4/ 15  and   s/p    iv steroid/    still requiring    high  O2  requirements,   continue   current  care/  on  8  liters  now  does  not  need  daily labs   cefepime   stopped/  bllood  c/s, negative   pt  afebrile normal  wbc     on  2 liters n/c  plan,  d/c  home  with oxygen  pt  wants  transport  arranged  pmd, 1  week/ d [facility]gard andujar       < from: Xray Chest 1 View-PORTABLE IMMEDIATE (04.10.20 @ 11:17) >  Impression: Vague patchy airspace opacities in the peripheral aspects of the bilateral lungs, concerning for viral pneumonia such as COVID.   < end of copied text >

## 2020-04-20 NOTE — PROGRESS NOTE ADULT - SUBJECTIVE AND OBJECTIVE BOX
CARDIOLOGY     PROGRESS  NOTE   ________________________________________________    CHIEF COMPLAINT:Patient is a 74y old  Male who presents with a chief complaint of sob (20 Apr 2020 10:38)  no complain.  	  REVIEW OF SYSTEMS:  CONSTITUTIONAL: No fever, weight loss, or fatigue  EYES: No eye pain, visual disturbances, or discharge  ENT:  No difficulty hearing, tinnitus, vertigo; No sinus or throat pain  NECK: No pain or stiffness  RESPIRATORY: No cough, wheezing, chills or hemoptysis; No Shortness of Breath  CARDIOVASCULAR: No chest pain, palpitations, passing out, dizziness, or leg swelling  GASTROINTESTINAL: No abdominal or epigastric pain. No nausea, vomiting, or hematemesis; No diarrhea or constipation. No melena or hematochezia.  GENITOURINARY: No dysuria, frequency, hematuria, or incontinence  NEUROLOGICAL: No headaches, memory loss, loss of strength, numbness, or tremors  SKIN: No itching, burning, rashes, or lesions   LYMPH Nodes: No enlarged glands  ENDOCRINE: No heat or cold intolerance; No hair loss  MUSCULOSKELETAL: No joint pain or swelling; No muscle, back, or extremity pain  PSYCHIATRIC: No depression, anxiety, mood swings, or difficulty sleeping  HEME/LYMPH: No easy bruising, or bleeding gums  ALLERGY AND IMMUNOLOGIC: No hives or eczema	    [ ] All others negative	  [ ] Unable to obtain    PHYSICAL EXAM:  T(C): 36.8 (04-20-20 @ 08:00), Max: 36.8 (04-19-20 @ 16:16)  HR: 89 (04-20-20 @ 08:00) (83 - 90)  BP: 110/74 (04-20-20 @ 08:00) (108/71 - 123/85)  RR: 20 (04-20-20 @ 11:14) (20 - 20)  SpO2: 95% (04-20-20 @ 11:14) (93% - 95%)  Wt(kg): --  I&O's Summary    19 Apr 2020 07:01  -  20 Apr 2020 07:00  --------------------------------------------------------  IN: 580 mL / OUT: 550 mL / NET: 30 mL        Appearance: Normal	  HEENT:   Normal oral mucosa, PERRL, EOMI	  Lymphatic: No lymphadenopathy  Cardiovascular: Normal S1 S2, No JVD, + murmurs, No edema  Respiratory: Lungs clear to auscultation	  Psychiatry: A & O x 3, Mood & affect appropriate  Gastrointestinal:  Soft, Non-tender, + BS	  Skin: No rashes, No ecchymoses, No cyanosis	  Neurologic: Non-focal  Extremities: Normal range of motion, No clubbing, cyanosis or edema  Vascular: Peripheral pulses palpable 2+ bilaterally    MEDICATIONS  (STANDING):  amLODIPine   Tablet 2.5 milliGRAM(s) Oral daily  enoxaparin Injectable 40 milliGRAM(s) SubCutaneous two times a day  simvastatin 20 milliGRAM(s) Oral at bedtime      TELEMETRY: 	    ECG:  	  RADIOLOGY:  OTHER: 	  	  LABS:	 	    CARDIAC MARKERS:                                14.8   7.43  )-----------( 186      ( 19 Apr 2020 07:22 )             45.4     04-19    136  |  100  |  19  ----------------------------<  114<H>  4.4   |  21<L>  |  0.90    Ca    9.1      19 Apr 2020 07:23      proBNP:   Lipid Profile:   HgA1c:   TSH:         Assessment and plan  ---------------------------  75 yo male with a pmh of HLD, HTN, c/o sob worse with exertion, fevers, chills for 5 days.  denies chest pain. Pt states he is unable to do minimal activity without getting sob. denies any sick contacts.  quit smoking 15 years ago.   pt also with hx of increase cholesterol , pt was dx with COVID and started on steroid and Hydroxychloroquine  no chest pain, +NEGRON.  pt with covid + ON IV SOLUMEDRAL DAY 2/ O2 REQUIREMENT  DECREASE TO 10 L NC  agree with change Lovenox to BID  will decide on anakinra if worsened  check and fu markers  HTN dc lisinopril, will add calcium channel blocker  o2 sat 92 % on 8 L NC improving steroid dcd  ID noted on ?abx  bp is better controlled  fu lytes  fu markers order for tomorrow  decrease o2 requirement   dc planning

## 2020-04-20 NOTE — PHYSICAL THERAPY INITIAL EVALUATION ADULT - ADDITIONAL COMMENTS
As per pt he lives in a private home alone w/ no steps at entry, pt was independent w/ all ADLs and mobility PTA. As per pt his girlfriend or son can help him if needed.

## 2020-04-20 NOTE — DISCHARGE NOTE PROVIDER - NSDCFUADDINST_GEN_ALL_CORE_FT
You tested positive for COVID 19 on  4/10/20 You no longer require hospitalization.  Please restrict activities outside of your home except for getting medical care.  Do not go to work, school, or public areas.  Avoid using public transportation, ride-sharing, or taxis.  Separate yourself from other people and animals in your home.  Call ahead before visiting your doctor.  Wear a facemask when you are around other people. Cover your cough and sneezes.  Clean your hands often.  Avoid sharing personal household items.  Clean all frequently touched surfaces daily.     Please READ the attached TWO (2) instruction sheets CAREFULLY   You will need to self-isolate at HOME for an ADDITIONAL WEEK    Continue to use INCENTIVE Spirometer 10-20 X per hour over the next 2 weeks (While Awake)

## 2020-04-21 ENCOUNTER — TRANSCRIPTION ENCOUNTER (OUTPATIENT)
Age: 75
End: 2020-04-21

## 2020-04-21 VITALS
RESPIRATION RATE: 20 BRPM | OXYGEN SATURATION: 94 % | DIASTOLIC BLOOD PRESSURE: 74 MMHG | HEART RATE: 72 BPM | TEMPERATURE: 99 F | SYSTOLIC BLOOD PRESSURE: 112 MMHG

## 2020-04-21 PROCEDURE — 99238 HOSP IP/OBS DSCHRG MGMT 30/<: CPT | Mod: CS

## 2020-04-21 RX ADMIN — AMLODIPINE BESYLATE 2.5 MILLIGRAM(S): 2.5 TABLET ORAL at 06:41

## 2020-04-21 RX ADMIN — ENOXAPARIN SODIUM 40 MILLIGRAM(S): 100 INJECTION SUBCUTANEOUS at 06:41

## 2020-04-21 NOTE — PROGRESS NOTE ADULT - SUBJECTIVE AND OBJECTIVE BOX
no  complaints    REVIEW OF SYSTEMS:  GEN: no fever,    no chills  RESP: no SOB,   no cough  CVS: no chest pain,   no palpitations  GI: no abdominal pain,   no nausea,   no vomiting,   no constipation,   no diarrhea  : no dysuria,   no frequency  NEURO: no headache,   no dizziness  PSYCH: no depression,   not anxious  Derm : no rash    MEDICATIONS  (STANDING):  amLODIPine   Tablet 2.5 milliGRAM(s) Oral daily  enoxaparin Injectable 40 milliGRAM(s) SubCutaneous two times a day  simvastatin 20 milliGRAM(s) Oral at bedtime    MEDICATIONS  (PRN):  acetaminophen   Tablet .. 650 milliGRAM(s) Oral every 6 hours PRN Mild Pain (1 - 3), Moderate Pain (4 - 6)  benzonatate 100 milliGRAM(s) Oral three times a day PRN Cough      Vital Signs Last 24 Hrs  T(C): 37 (21 Apr 2020 08:30), Max: 37.2 (20 Apr 2020 17:40)  T(F): 98.6 (21 Apr 2020 08:30), Max: 98.9 (20 Apr 2020 17:40)  HR: 72 (21 Apr 2020 08:30) (70 - 92)  BP: 112/74 (21 Apr 2020 08:30) (110/76 - 125/83)  BP(mean): --  RR: 20 (21 Apr 2020 08:30) (20 - 20)  SpO2: 94% (21 Apr 2020 08:30) (93% - 97%)  CAPILLARY BLOOD GLUCOSE        I&O's Summary    20 Apr 2020 07:01  -  21 Apr 2020 07:00  --------------------------------------------------------  IN: 600 mL / OUT: 1150 mL / NET: -550 mL    21 Apr 2020 07:01  -  21 Apr 2020 11:24  --------------------------------------------------------  IN: 360 mL / OUT: 250 mL / NET: 110 mL        PHYSICAL EXAM:  HEAD:  Atraumatic, Normocephalic  NECK: Supple, No   JVD  CHEST/LUNG:   no     rales,     no,    rhonchi  HEART: Regular rate and rhythm;         murmur  ABDOMEN: Soft, Nontender, ;   EXTREMITIES:  no      edema  NEUROLOGY:  alert    LABS:                                  Consultant(s) Notes Reviewed:      Care Discussed with Consultants/Other Providers:

## 2020-04-21 NOTE — PROGRESS NOTE ADULT - ASSESSMENT
73 yo male      with a pmh of   HLD,  HTN,  ex   smoker,  quit  15  yrs          c/o sob worse with exertion, fevers, chills for 5 days. /  IN  eR ,mild  tachycardia.,   hypoxia  to 90, on oxygen   /  sb[p   of 120 no  cp     Pt states he is unable to do minimal activity without getting sob. denies any sick contacts.   pt with normal  wbc/ mild  hyponatremia.    Covid, Positive, on PLAQUENIL,   s/p   IV STEROID   pt  has  elevated  D dimer/ PTT/  LDH/ fibrinogen  ID eval dr Casey,   cxr/ with opacities.  b/l   on lovenox , bid/  Wt is  90 kg    bp  meds  titrated  and   sbp  has improved  continue  supportive care/  hypoxia. on oxygen/  laquenil  completed  on 4/ 15  and   s/p    iv steroid/    still requiring    high  O2  requirements,   continue   current  care/  on  8  liters  now  does  not  need  daily labs   cefepime   stopped/  bllood  c/s, negative   pt  afebrile normal  wbc     on  2 liters n/c  plan,  d/c  home  with oxygen, on 4/ 20,  was  the plan  pt  wants  transport  arranged  pmd, 1  week/ d [facility] and[facility]  pt still here/   pt   with  stable  vitals  and  room air  sat  of  94  proceed with  d/c  today       < from: Xray Chest 1 View-PORTABLE IMMEDIATE (04.10.20 @ 11:17) >  Impression: Vague patchy airspace opacities in the peripheral aspects of the bilateral lungs, concerning for viral pneumonia such as COVID.   < end of copied text >

## 2020-04-21 NOTE — DISCHARGE NOTE NURSING/CASE MANAGEMENT/SOCIAL WORK - NSDCFUADDAPPT_GEN_ALL_CORE_FT
Follow up with your private internist Dr Gonzalo Martin @ [phone] weeks re: general checkup (and possible medication adjustment) If you can NOT contact your MD, please call [phone]

## 2020-04-21 NOTE — DISCHARGE NOTE NURSING/CASE MANAGEMENT/SOCIAL WORK - PATIENT PORTAL LINK FT
You can access the FollowMyHealth Patient Portal offered by [facility] by registering at the following website: [email or web address]. By joining [employer]’s FollowMyHealth portal, you will also be able to view your health information using other applications (apps) compatible with our system.

## 2020-04-21 NOTE — PROGRESS NOTE ADULT - REASON FOR ADMISSION
sob

## 2020-04-21 NOTE — CHART NOTE - NSCHARTNOTEFT_GEN_A_CORE
Nutrition Follow-up Note  Patient seen for: nutrition follow up     Chart reviewed, events noted.    Source of Information:  Face-to-face RD interview deferred at this time secondary to restricted isolation precautions with pt medical condition.  RD interview conducted with pt via phone at this time.    Current Diet: Regular    GI: No acute GI distress reported. Unclear when last BM was.    PO intake: fair/good; pt reports improved appetite and po intake since admission and endorses drinking mighty shakes being provided 2x/daily. Pt has no nutrition related questions at this time and defers expounding on typical diet intake PTA. Encouraged pt to continue with improving po intake as able as he recovers. Pt made aware RD remains available.    Weights: no new wt to assess    Previous Nutrition Diagnosis: Inadequate protein-energy intake    New Nutrition Diagnosis: n/a    Nutrition Care Plan:  [ ] In progress   [x] Achieved    Nutrition Recommendations:  1. Continue to provide current diet order, no therapeutic diet restrictions indicated at this time given h/o decreased po, still improving. Consider low sodium diet if po intake improves further.  2. Continue to provide 2 Mighty [employer] Shakes/daily (to provide additional 344 kcal, 10 g protein).    Monitoring and Evaluation:   Continue to monitor Nutritional intake, Tolerance to diet prescription, weights, labs, skin integrity    RD remains available upon request and will follow up per protocol. Alfreda Tyler RD, [phone]

## 2020-04-21 NOTE — PROGRESS NOTE ADULT - SUBJECTIVE AND OBJECTIVE BOX
CARDIOLOGY     PROGRESS  NOTE   ________________________________________________    CHIEF COMPLAINT:Patient is a 74y old  Male who presents with a chief complaint of sob (20 Apr 2020 21:42)  no complain  	  REVIEW OF SYSTEMS:  CONSTITUTIONAL: No fever, weight loss, or fatigue  EYES: No eye pain, visual disturbances, or discharge  ENT:  No difficulty hearing, tinnitus, vertigo; No sinus or throat pain  NECK: No pain or stiffness  RESPIRATORY: No cough, wheezing, chills or hemoptysis; + Shortness of Breath  CARDIOVASCULAR: No chest pain, palpitations, passing out, dizziness, or leg swelling  GASTROINTESTINAL: No abdominal or epigastric pain. No nausea, vomiting, or hematemesis; No diarrhea or constipation. No melena or hematochezia.  GENITOURINARY: No dysuria, frequency, hematuria, or incontinence  NEUROLOGICAL: No headaches, memory loss, loss of strength, numbness, or tremors  SKIN: No itching, burning, rashes, or lesions   LYMPH Nodes: No enlarged glands  ENDOCRINE: No heat or cold intolerance; No hair loss  MUSCULOSKELETAL: No joint pain or swelling; No muscle, back, or extremity pain  PSYCHIATRIC: No depression, anxiety, mood swings, or difficulty sleeping  HEME/LYMPH: No easy bruising, or bleeding gums  ALLERGY AND IMMUNOLOGIC: No hives or eczema	    [ ] All others negative	  [ ] Unable to obtain    PHYSICAL EXAM:  T(C): 37 (04-21-20 @ 08:30), Max: 37.2 (04-20-20 @ 17:40)  HR: 72 (04-21-20 @ 08:30) (70 - 92)  BP: 112/74 (04-21-20 @ 08:30) (110/76 - 125/83)  RR: 20 (04-21-20 @ 08:30) (20 - 20)  SpO2: 94% (04-21-20 @ 08:30) (93% - 97%)  Wt(kg): --  I&O's Summary    20 Apr 2020 07:01  -  21 Apr 2020 07:00  --------------------------------------------------------  IN: 600 mL / OUT: 1150 mL / NET: -550 mL    21 Apr 2020 07:01  -  21 Apr 2020 11:14  --------------------------------------------------------  IN: 360 mL / OUT: 250 mL / NET: 110 mL        Appearance: Normal	  HEENT:   Normal oral mucosa, PERRL, EOMI	  Lymphatic: No lymphadenopathy  Cardiovascular: Normal S1 S2, No JVD, + murmurs, No edema  Respiratory: Lungs clear to auscultation	  Psychiatry: A & O x 3, Mood & affect appropriate  Gastrointestinal:  Soft, Non-tender, + BS	  Skin: No rashes, No ecchymoses, No cyanosis	  Neurologic: Non-focal  Extremities: Normal range of motion, No clubbing, cyanosis or edema  Vascular: Peripheral pulses palpable 2+ bilaterally    MEDICATIONS  (STANDING):  amLODIPine   Tablet 2.5 milliGRAM(s) Oral daily  enoxaparin Injectable 40 milliGRAM(s) SubCutaneous two times a day  simvastatin 20 milliGRAM(s) Oral at bedtime      TELEMETRY: 	    ECG:  	  RADIOLOGY:  OTHER: 	  	  LABS:	 	    CARDIAC MARKERS:                  proBNP:   Lipid Profile:   HgA1c:   TSH:         Assessment and plan  ---------------------------  73 yo male with a pmh of HLD, HTN, c/o sob worse with exertion, fevers, chills for 5 days.  denies chest pain. Pt states he is unable to do minimal activity without getting sob. denies any sick contacts.  quit smoking 15 years ago.   pt also with hx of increase cholesterol , pt was dx with COVID and started on steroid and Hydroxychloroquine  no chest pain, +NEGRON.  pt with covid + ON IV SOLUMEDRAL DAY 2/ O2 REQUIREMENT  DECREASE TO 10 L NC  agree with change Lovenox to BID  will decide on anakinra if worsened  check and fu markers  HTN dc lisinopril, will add calcium channel blocker  o2 sat 92 % on 8 L NC improving steroid dcd  ID noted on ?abx  bp is better controlled  fu lytes  fu markers order for tomorrow  decrease o2 requirement   dc planning

## 2020-04-23 LAB
CULTURE RESULTS: SIGNIFICANT CHANGE UP
CULTURE RESULTS: SIGNIFICANT CHANGE UP
SPECIMEN SOURCE: SIGNIFICANT CHANGE UP
SPECIMEN SOURCE: SIGNIFICANT CHANGE UP

## 2020-04-30 PROCEDURE — 85610 PROTHROMBIN TIME: CPT

## 2020-04-30 PROCEDURE — 82728 ASSAY OF FERRITIN: CPT

## 2020-04-30 PROCEDURE — 84295 ASSAY OF SERUM SODIUM: CPT

## 2020-04-30 PROCEDURE — 82435 ASSAY OF BLOOD CHLORIDE: CPT

## 2020-04-30 PROCEDURE — 85384 FIBRINOGEN ACTIVITY: CPT

## 2020-04-30 PROCEDURE — 87040 BLOOD CULTURE FOR BACTERIA: CPT

## 2020-04-30 PROCEDURE — 83615 LACTATE (LD) (LDH) ENZYME: CPT

## 2020-04-30 PROCEDURE — 80048 BASIC METABOLIC PNL TOTAL CA: CPT

## 2020-04-30 PROCEDURE — 82803 BLOOD GASES ANY COMBINATION: CPT

## 2020-04-30 PROCEDURE — 82947 ASSAY GLUCOSE BLOOD QUANT: CPT

## 2020-04-30 PROCEDURE — 99285 EMERGENCY DEPT VISIT HI MDM: CPT | Mod: 25

## 2020-04-30 PROCEDURE — 80053 COMPREHEN METABOLIC PANEL: CPT

## 2020-04-30 PROCEDURE — 85027 COMPLETE CBC AUTOMATED: CPT

## 2020-04-30 PROCEDURE — 71045 X-RAY EXAM CHEST 1 VIEW: CPT

## 2020-04-30 PROCEDURE — 83605 ASSAY OF LACTIC ACID: CPT

## 2020-04-30 PROCEDURE — 97161 PT EVAL LOW COMPLEX 20 MIN: CPT

## 2020-04-30 PROCEDURE — 84132 ASSAY OF SERUM POTASSIUM: CPT

## 2020-04-30 PROCEDURE — 84145 PROCALCITONIN (PCT): CPT

## 2020-04-30 PROCEDURE — 85379 FIBRIN DEGRADATION QUANT: CPT

## 2020-04-30 PROCEDURE — 85730 THROMBOPLASTIN TIME PARTIAL: CPT

## 2020-04-30 PROCEDURE — 87635 SARS-COV-2 COVID-19 AMP PRB: CPT

## 2020-04-30 PROCEDURE — 86140 C-REACTIVE PROTEIN: CPT

## 2020-04-30 PROCEDURE — 82330 ASSAY OF CALCIUM: CPT

## 2020-04-30 PROCEDURE — 85014 HEMATOCRIT: CPT

## 2020-04-30 PROCEDURE — 82962 GLUCOSE BLOOD TEST: CPT

## 2020-04-30 PROCEDURE — 93005 ELECTROCARDIOGRAM TRACING: CPT

## 2020-05-13 ENCOUNTER — TRANSCRIPTION ENCOUNTER (OUTPATIENT)
Age: 75
End: 2020-05-13

## 2020-10-22 ENCOUNTER — INPATIENT (INPATIENT)
Facility: HOSPITAL | Age: 75
LOS: 2 days | Discharge: HOME | DRG: 101 | End: 2020-10-25
Attending: PSYCHIATRY & NEUROLOGY | Admitting: PSYCHIATRY & NEUROLOGY
Payer: MEDICARE

## 2020-10-22 VITALS
RESPIRATION RATE: 20 BRPM | DIASTOLIC BLOOD PRESSURE: 78 MMHG | HEART RATE: 82 BPM | OXYGEN SATURATION: 100 % | WEIGHT: 184.97 LBS | SYSTOLIC BLOOD PRESSURE: 147 MMHG | HEIGHT: 73 IN

## 2020-10-22 PROCEDURE — 93010 ELECTROCARDIOGRAM REPORT: CPT | Mod: GC

## 2020-10-22 PROCEDURE — 99285 EMERGENCY DEPT VISIT HI MDM: CPT | Mod: CS,GC

## 2020-10-22 NOTE — ED PROVIDER NOTE - PROGRESS NOTE DETAILS
PGY 1 Lele Martinez: Pt had second brief seizure in room. Case discussed w/ neurology, they recommend CK, blood cultures, and stat CT head. They will see the pt. Dr. Hernandez: Called into pt room for second episode of unresponsiveness. This time lasted ~1-2 minutes, eyes open, no convulsions, however did drop O2 sat. Confused again when recovered and c/o HA in front and back of head. Pt straight to CT on a monitor w RN and doctor. Neuro consulted for likely 2nd seizure and met pt in CT. Neuro eval ongoing at this time. PGY 1 Lele Martinez: Work up thus far w/o concerning findings beyond CT imaging. Case discussed w/ neurology again, and they are accepting pt for admission. Deferring LP until vascular surgery consult obtained. Awaiting call back from vascular at this time. Pt complaining of worsening headache.  Discussed with neurology - will admit to neuro service, vascular will consult.  Neuro resident will come re-evaluate patient.  - Joann Salmon, DO

## 2020-10-22 NOTE — ED PROVIDER NOTE - ATTENDING CONTRIBUTION TO CARE
75y M hx of HTN, high cholesterol, oral herpes here with episode of unresponsiveness associated with heavy breathing, tongue bite and AMS. Upon arrival to the ED he was awake, alert, speaking clearly, but confused. Episode sounds like a possible seizure and now likely resolving post-ictal period. Will send labs, obtain CT head.

## 2020-10-22 NOTE — ED PROVIDER NOTE - PHYSICAL EXAMINATION
Dr. Hernandez:  Gen: WNWD confused, repetitive   HEENT: NCAT PERRL EOMI normal pharynx tongue laceration/abrasions to tip tongue and BL sides tongue  Neck: supple  CV: RRR, no murmur  Lung: CTA BL  Abd: +BS soft NTND  Ext: wwp, palp pulses, FROMx4, no cce  Neuro: Awake alert oriented to self and time, unsure where he is or how he got here, CN grossly intact, no slurred speech, sensation intact, motor 5/5 throughout, no drift

## 2020-10-22 NOTE — ED PROVIDER NOTE - OBJECTIVE STATEMENT
75y M hx htn BIBEMS from home friend noted him to have heavy breathing, non responsive, bleeding from his mouth, no loss of bowel or bladder control. Episode lasted 2-3 minutes. 911 called immediately. No fall. Denies drug or alcohol use. Dr. Hernandez: 75y M hx htn, high chol BIBEMS from home friend noted him to have heavy breathing, and was unresponsive, bleeding from his mouth, no loss of bowel or bladder control. Episode occurred while lying on couch, lasted 2-3 minutes. 911 called immediately. No fall. Denies drug or alcohol use. Not on AC.     Friend Joann [phone]  Reports pt taking lisinopril, valacyclin- reportedly for cold sores Dr. Hernandez: 75y M hx htn, high chol BIBEMS from home friend noted him to have heavy breathing, and was unresponsive, bleeding from his mouth, no loss of bowel or bladder control. Episode occurred while lying on couch at 1030 PM, lasted 2-3 minutes. 911 called immediately. No fall. Denies drug or alcohol use. Not on AC.     Friend Joann [phone]  Reports pt taking lisinopril, valacyclin- reportedly for cold sores

## 2020-10-23 DIAGNOSIS — R40.4 TRANSIENT ALTERATION OF AWARENESS: ICD-10-CM

## 2020-10-23 PROBLEM — I10 ESSENTIAL (PRIMARY) HYPERTENSION: Chronic | Status: ACTIVE | Noted: 2020-04-10

## 2020-10-23 PROBLEM — E78.5 HYPERLIPIDEMIA, UNSPECIFIED: Chronic | Status: ACTIVE | Noted: 2020-04-10

## 2020-10-23 LAB
ALBUMIN SERPL ELPH-MCNC: 4.3 G/DL — SIGNIFICANT CHANGE UP (ref 3.3–5)
ALP SERPL-CCNC: 44 U/L — SIGNIFICANT CHANGE UP (ref 40–120)
ALT FLD-CCNC: 14 U/L — SIGNIFICANT CHANGE UP (ref 10–45)
ANION GAP SERPL CALC-SCNC: 13 MMOL/L — SIGNIFICANT CHANGE UP (ref 5–17)
APPEARANCE CSF: CLEAR — SIGNIFICANT CHANGE UP
APPEARANCE CSF: CLEAR — SIGNIFICANT CHANGE UP
APPEARANCE UR: CLEAR — SIGNIFICANT CHANGE UP
APTT BLD: 21.4 SEC — LOW (ref 27.5–35.5)
AST SERPL-CCNC: 20 U/L — SIGNIFICANT CHANGE UP (ref 10–40)
BACTERIA # UR AUTO: NEGATIVE — SIGNIFICANT CHANGE UP
BASE EXCESS BLDV CALC-SCNC: 1 MMOL/L — SIGNIFICANT CHANGE UP (ref -2–2)
BASOPHILS # BLD AUTO: 0.02 K/UL — SIGNIFICANT CHANGE UP (ref 0–0.2)
BASOPHILS NFR BLD AUTO: 0.3 % — SIGNIFICANT CHANGE UP (ref 0–2)
BILIRUB SERPL-MCNC: 0.4 MG/DL — SIGNIFICANT CHANGE UP (ref 0.2–1.2)
BILIRUB UR-MCNC: NEGATIVE — SIGNIFICANT CHANGE UP
BUN SERPL-MCNC: 25 MG/DL — HIGH (ref 7–23)
CA-I SERPL-SCNC: 1.13 MMOL/L — SIGNIFICANT CHANGE UP (ref 1.12–1.3)
CALCIUM SERPL-MCNC: 9.2 MG/DL — SIGNIFICANT CHANGE UP (ref 8.4–10.5)
CHLORIDE BLDV-SCNC: 106 MMOL/L — SIGNIFICANT CHANGE UP (ref 96–108)
CHLORIDE SERPL-SCNC: 102 MMOL/L — SIGNIFICANT CHANGE UP (ref 96–108)
CO2 BLDV-SCNC: 26 MMOL/L — SIGNIFICANT CHANGE UP (ref 22–30)
CO2 SERPL-SCNC: 23 MMOL/L — SIGNIFICANT CHANGE UP (ref 22–31)
COLOR CSF: SIGNIFICANT CHANGE UP
COLOR CSF: SIGNIFICANT CHANGE UP
COLOR SPEC: SIGNIFICANT CHANGE UP
CREAT SERPL-MCNC: 1.15 MG/DL — SIGNIFICANT CHANGE UP (ref 0.5–1.3)
CRYPTOC AG CSF-ACNC: NEGATIVE — SIGNIFICANT CHANGE UP
CSF PCR RESULT: SIGNIFICANT CHANGE UP
DIFF PNL FLD: NEGATIVE — SIGNIFICANT CHANGE UP
EOSINOPHIL # BLD AUTO: 0.07 K/UL — SIGNIFICANT CHANGE UP (ref 0–0.5)
EOSINOPHIL NFR BLD AUTO: 1 % — SIGNIFICANT CHANGE UP (ref 0–6)
EPI CELLS # UR: 1 /HPF — SIGNIFICANT CHANGE UP
ETHANOL SERPL-MCNC: SIGNIFICANT CHANGE UP MG/DL (ref 0–10)
GAS PNL BLDV: 134 MMOL/L — LOW (ref 135–145)
GAS PNL BLDV: SIGNIFICANT CHANGE UP
GLUCOSE BLDV-MCNC: 127 MG/DL — HIGH (ref 70–99)
GLUCOSE CSF-MCNC: 92 MG/DL — HIGH (ref 40–70)
GLUCOSE SERPL-MCNC: 132 MG/DL — HIGH (ref 70–99)
GLUCOSE UR QL: NEGATIVE — SIGNIFICANT CHANGE UP
GRAM STN FLD: SIGNIFICANT CHANGE UP
HCO3 BLDV-SCNC: 25 MMOL/L — SIGNIFICANT CHANGE UP (ref 21–29)
HCT VFR BLD CALC: 41.3 % — SIGNIFICANT CHANGE UP (ref 39–50)
HCT VFR BLDA CALC: 43 % — SIGNIFICANT CHANGE UP (ref 39–50)
HGB BLD CALC-MCNC: 13.9 G/DL — SIGNIFICANT CHANGE UP (ref 13–17)
HGB BLD-MCNC: 13.2 G/DL — SIGNIFICANT CHANGE UP (ref 13–17)
HYALINE CASTS # UR AUTO: 1 /LPF — SIGNIFICANT CHANGE UP (ref 0–2)
IMM GRANULOCYTES NFR BLD AUTO: 1.3 % — SIGNIFICANT CHANGE UP (ref 0–1.5)
INR BLD: 0.93 RATIO — SIGNIFICANT CHANGE UP (ref 0.88–1.16)
KETONES UR-MCNC: SIGNIFICANT CHANGE UP
LABORATORY COMMENT REPORT: SIGNIFICANT CHANGE UP
LACTATE BLDV-MCNC: 4 MMOL/L — CRITICAL HIGH (ref 0.7–2)
LDH CSF L TO P-CCNC: 29 U/L — SIGNIFICANT CHANGE UP
LDH FLD-CCNC: 29 U/L — SIGNIFICANT CHANGE UP
LEUKOCYTE ESTERASE UR-ACNC: NEGATIVE — SIGNIFICANT CHANGE UP
LYMPHOCYTES # BLD AUTO: 1.61 K/UL — SIGNIFICANT CHANGE UP (ref 1–3.3)
LYMPHOCYTES # BLD AUTO: 22.5 % — SIGNIFICANT CHANGE UP (ref 13–44)
LYMPHOCYTES # CSF: 69 % — SIGNIFICANT CHANGE UP (ref 40–80)
MAGNESIUM SERPL-MCNC: 2 MG/DL — SIGNIFICANT CHANGE UP (ref 1.6–2.6)
MCHC RBC-ENTMCNC: 29.3 PG — SIGNIFICANT CHANGE UP (ref 27–34)
MCHC RBC-ENTMCNC: 32 GM/DL — SIGNIFICANT CHANGE UP (ref 32–36)
MCV RBC AUTO: 91.8 FL — SIGNIFICANT CHANGE UP (ref 80–100)
MONOCYTES # BLD AUTO: 0.61 K/UL — SIGNIFICANT CHANGE UP (ref 0–0.9)
MONOCYTES NFR BLD AUTO: 8.5 % — SIGNIFICANT CHANGE UP (ref 2–14)
MONOS+MACROS NFR CSF: 28 % — SIGNIFICANT CHANGE UP (ref 15–45)
NEUTROPHILS # BLD AUTO: 4.77 K/UL — SIGNIFICANT CHANGE UP (ref 1.8–7.4)
NEUTROPHILS # CSF: 3 % — SIGNIFICANT CHANGE UP (ref 0–6)
NEUTROPHILS # CSF: SIGNIFICANT CHANGE UP (ref 0–6)
NEUTROPHILS NFR BLD AUTO: 66.4 % — SIGNIFICANT CHANGE UP (ref 43–77)
NITRITE UR-MCNC: NEGATIVE — SIGNIFICANT CHANGE UP
NRBC # BLD: 0 /100 WBCS — SIGNIFICANT CHANGE UP (ref 0–0)
NRBC NFR CSF: 1 /UL — SIGNIFICANT CHANGE UP (ref 0–5)
NRBC NFR CSF: <1 /UL — SIGNIFICANT CHANGE UP (ref 0–5)
OTHER CELLS CSF MANUAL: 13 ML/DL — LOW (ref 18–22)
PCO2 BLDV: 39 MMHG — SIGNIFICANT CHANGE UP (ref 35–50)
PH BLDV: 7.42 — SIGNIFICANT CHANGE UP (ref 7.35–7.45)
PH UR: 7 — SIGNIFICANT CHANGE UP (ref 5–8)
PLATELET # BLD AUTO: 147 K/UL — LOW (ref 150–400)
PO2 BLDV: 37 MMHG — SIGNIFICANT CHANGE UP (ref 25–45)
POTASSIUM BLDV-SCNC: 4 MMOL/L — SIGNIFICANT CHANGE UP (ref 3.5–5.3)
POTASSIUM SERPL-MCNC: 4.2 MMOL/L — SIGNIFICANT CHANGE UP (ref 3.5–5.3)
POTASSIUM SERPL-SCNC: 4.2 MMOL/L — SIGNIFICANT CHANGE UP (ref 3.5–5.3)
PROT CSF-MCNC: 26 MG/DL — SIGNIFICANT CHANGE UP (ref 15–45)
PROT SERPL-MCNC: 6.7 G/DL — SIGNIFICANT CHANGE UP (ref 6–8.3)
PROT UR-MCNC: ABNORMAL
PROTHROM AB SERPL-ACNC: 11.2 SEC — SIGNIFICANT CHANGE UP (ref 10.6–13.6)
RBC # BLD: 4.5 M/UL — SIGNIFICANT CHANGE UP (ref 4.2–5.8)
RBC # CSF: 2 /UL — HIGH (ref 0–0)
RBC # CSF: 3 /UL — HIGH (ref 0–0)
RBC # FLD: 15.6 % — HIGH (ref 10.3–14.5)
RBC CASTS # UR COMP ASSIST: 2 /HPF — SIGNIFICANT CHANGE UP (ref 0–4)
SAO2 % BLDV: 69 % — SIGNIFICANT CHANGE UP (ref 67–88)
SARS-COV-2 RNA SPEC QL NAA+PROBE: SIGNIFICANT CHANGE UP
SODIUM SERPL-SCNC: 138 MMOL/L — SIGNIFICANT CHANGE UP (ref 135–145)
SOURCE HSV 1/2: SIGNIFICANT CHANGE UP
SP GR SPEC: 1.03 — HIGH (ref 1.01–1.02)
SPECIMEN SOURCE: SIGNIFICANT CHANGE UP
TUBE TYPE: SIGNIFICANT CHANGE UP
TUBE TYPE: SIGNIFICANT CHANGE UP
UROBILINOGEN FLD QL: NEGATIVE — SIGNIFICANT CHANGE UP
WBC # BLD: 7.17 K/UL — SIGNIFICANT CHANGE UP (ref 3.8–10.5)
WBC # FLD AUTO: 7.17 K/UL — SIGNIFICANT CHANGE UP (ref 3.8–10.5)
WBC UR QL: 1 /HPF — SIGNIFICANT CHANGE UP (ref 0–5)

## 2020-10-23 PROCEDURE — 70496 CT ANGIOGRAPHY HEAD: CPT | Mod: 26

## 2020-10-23 PROCEDURE — 99223 1ST HOSP IP/OBS HIGH 75: CPT | Mod: CS

## 2020-10-23 PROCEDURE — 70498 CT ANGIOGRAPHY NECK: CPT | Mod: 26

## 2020-10-23 PROCEDURE — 95720 EEG PHY/QHP EA INCR W/VEEG: CPT

## 2020-10-23 PROCEDURE — 93010 ELECTROCARDIOGRAM REPORT: CPT

## 2020-10-23 RX ORDER — SODIUM CHLORIDE 9 MG/ML
1000 INJECTION INTRAMUSCULAR; INTRAVENOUS; SUBCUTANEOUS
Refills: 0 | Status: DISCONTINUED | OUTPATIENT
Start: 2020-10-23 | End: 2020-10-25

## 2020-10-23 RX ORDER — DIPHENHYDRAMINE HYDROCHLORIDE AND LIDOCAINE HYDROCHLORIDE AND ALUMINUM HYDROXIDE AND MAGNESIUM HYDRO
10 KIT THREE TIMES A DAY
Refills: 0 | Status: DISCONTINUED | OUTPATIENT
Start: 2020-10-23 | End: 2020-10-25

## 2020-10-23 RX ORDER — LIDOCAINE HCL 20 MG/ML
50 VIAL (ML) INJECTION ONCE
Refills: 0 | Status: DISCONTINUED | OUTPATIENT
Start: 2020-10-23 | End: 2020-10-25

## 2020-10-23 RX ORDER — SIMVASTATIN 20 MG/1
20 TABLET, FILM COATED ORAL AT BEDTIME
Refills: 0 | Status: DISCONTINUED | OUTPATIENT
Start: 2020-10-23 | End: 2020-10-25

## 2020-10-23 RX ORDER — VALPROIC ACID (AS SODIUM SALT) 250 MG/5ML
1750 SOLUTION, ORAL ORAL ONCE
Refills: 0 | Status: COMPLETED | OUTPATIENT
Start: 2020-10-23 | End: 2020-10-23

## 2020-10-23 RX ORDER — ACETAMINOPHEN 500 MG
1000 TABLET ORAL ONCE
Refills: 0 | Status: COMPLETED | OUTPATIENT
Start: 2020-10-23 | End: 2020-10-23

## 2020-10-23 RX ORDER — ACETAMINOPHEN 500 MG
650 TABLET ORAL EVERY 6 HOURS
Refills: 0 | Status: DISCONTINUED | OUTPATIENT
Start: 2020-10-23 | End: 2020-10-25

## 2020-10-23 RX ORDER — ENOXAPARIN SODIUM 100 MG/ML
40 INJECTION SUBCUTANEOUS DAILY
Refills: 0 | Status: DISCONTINUED | OUTPATIENT
Start: 2020-10-23 | End: 2020-10-25

## 2020-10-23 RX ORDER — LISINOPRIL 2.5 MG/1
10 TABLET ORAL DAILY
Refills: 0 | Status: DISCONTINUED | OUTPATIENT
Start: 2020-10-23 | End: 2020-10-25

## 2020-10-23 RX ORDER — LEVETIRACETAM 250 MG/1
500 TABLET, FILM COATED ORAL EVERY 12 HOURS
Refills: 0 | Status: DISCONTINUED | OUTPATIENT
Start: 2020-10-23 | End: 2020-10-25

## 2020-10-23 RX ORDER — VALACYCLOVIR 500 MG/1
1000 TABLET, FILM COATED ORAL ONCE
Refills: 0 | Status: COMPLETED | OUTPATIENT
Start: 2020-10-23 | End: 2020-10-23

## 2020-10-23 RX ADMIN — VALACYCLOVIR 1000 MILLIGRAM(S): 500 TABLET, FILM COATED ORAL at 01:26

## 2020-10-23 RX ADMIN — SIMVASTATIN 20 MILLIGRAM(S): 20 TABLET, FILM COATED ORAL at 21:22

## 2020-10-23 RX ADMIN — SODIUM CHLORIDE 50 MILLILITER(S): 9 INJECTION INTRAMUSCULAR; INTRAVENOUS; SUBCUTANEOUS at 06:15

## 2020-10-23 RX ADMIN — Medication 25 MILLIGRAM(S): at 01:33

## 2020-10-23 RX ADMIN — Medication 400 MILLIGRAM(S): at 01:22

## 2020-10-23 RX ADMIN — ENOXAPARIN SODIUM 40 MILLIGRAM(S): 100 INJECTION SUBCUTANEOUS at 18:42

## 2020-10-23 RX ADMIN — LEVETIRACETAM 400 MILLIGRAM(S): 250 TABLET, FILM COATED ORAL at 18:42

## 2020-10-23 RX ADMIN — DIPHENHYDRAMINE HYDROCHLORIDE AND LIDOCAINE HYDROCHLORIDE AND ALUMINUM HYDROXIDE AND MAGNESIUM HYDRO 10 MILLILITER(S): KIT at 18:43

## 2020-10-23 RX ADMIN — LISINOPRIL 10 MILLIGRAM(S): 2.5 TABLET ORAL at 06:14

## 2020-10-23 NOTE — H&P ADULT - NSHPSOCIALHISTORY_GEN_ALL_CORE
Denies tobacco, alcohol or drug use.  Prior tobacco exposure prolonged (lived in house with father smoking inside).

## 2020-10-23 NOTE — ED ADULT NURSE NOTE - OBJECTIVE STATEMENT
74 yo male with a PMH of HTN, HLD presents to the ED via EMS from home complaining of altered mental status. Per EMS, patient is living with a friend when she found him suddenly unresponsive (last seen normal at around 2230). Friend called EMS and when EMS arrived, found patient with blood around his mouth and unresponsive. EMS gave narcan and patient then began waking up. Upon arrival, patient is confused and asking repetitive questions. Is unable to recall last event prior to altered mental status episode. Is complaining of a headache, "the back of my head just hurts so bad." MD Hernandez spoke with friend, friend denies any head injury or trauma--no falls. Is A&Ox1 at this time (is able to verbalize name, is confused to place and time). Patient has no previous history of a stroke or seizure. Appears patient had bit tongue when episode happened. Patient is following commands at this time, no drift when raising arms or legs. No slurred speech or facial droop noted at this time. PERRL, 2mm. Denies vision changes, chest pain, shortness of breath, abdominal pain, nausea, vomiting, diarrhea, fevers, chills, dysuria, hematuria, recent illness travel or fall. 74 yo male with a PMH of HTN, HLD presents to the ED via EMS from home complaining of altered mental status. Per EMS, patient is living with a friend when she found him suddenly unresponsive (last seen normal at around 2230). Friend denies seeing any convulsing actions, states that episode lasted about 2-3 minutes. Friend called EMS and when EMS arrived, found patient with blood around his mouth and unresponsive. EMS gave narcan and patient then began waking up, was asking repetitive questions. Upon arrival, patient is confused and asking repetitive questions. Is unable to recall last event prior to altered mental status episode. Is complaining of a headache, "the back of my head just hurts so bad." MD Hernandez spoke with friend, friend denies any head injury or trauma--no falls. Is A&Ox1 at this time (is able to verbalize name, is confused to place and time). Patient has no previous history of a stroke or seizure. Appears patient had bit tongue when episode happened. Patient is following commands at this time, no drift when raising arms or legs. No slurred speech or facial droop noted at this time. PERRL, 2mm. Denies vision changes, chest pain, shortness of breath, abdominal pain, nausea, vomiting, diarrhea, fevers, chills, dysuria, hematuria, recent illness travel or fall. 76 yo male with a PMH of HTN, HLD presents to the ED via EMS from home complaining of altered mental status. Per EMS, patient is living with a friend when she found him suddenly unresponsive (last seen normal at around 2230). Friend denies seeing any convulsing actions, states that episode lasted about 2-3 minutes. Friend called EMS and when EMS arrived, found patient with blood around his mouth and unresponsive. EMS gave narcan and patient then began waking up, was asking repetitive questions. Upon arrival, patient is confused and asking repetitive questions. Is unable to recall last event prior to altered mental status episode. Is complaining of a headache, "the back of my head just hurts so bad." MD Hernandez spoke with friend, friend denies any head injury or trauma--no falls. Is A&Ox1 at this time (is able to verbalize name, is confused to place and time). Patient has no previous history of a stroke or seizure. Appears patient had bit tongue when episode happened. Patient is following commands at this time, no drift when raising arms or legs. No slurred speech or facial droop noted at this time. PERRL, 2mm. Upon moving patient from the hallway to a room, had an episode whDenies vision changes, chest pain, shortness of breath, abdominal pain, nausea, vomiting, diarrhea, fevers, chills, dysuria, hematuria, recent illness travel or fall. 74 yo male with a PMH of HTN, HLD presents to the ED via EMS from home complaining of altered mental status. Per EMS, patient is living with a friend when she found him suddenly unresponsive (last seen normal at around 2230). Friend denies seeing any convulsing actions, states that episode lasted about 2-3 minutes. Friend called EMS and when EMS arrived, found patient with blood around his mouth and unresponsive. EMS gave narcan and patient then began waking up, was asking repetitive questions. Upon arrival, patient is confused and asking repetitive questions. Is unable to recall last event prior to altered mental status episode. Is complaining of a headache, "the back of my head just hurts so bad." MD Hernandez spoke with friend, friend denies any head injury or trauma--no falls. Is A&Ox1 at this time (is able to verbalize name, is confused to place and time). Patient has no previous history of a stroke or seizure. Appears patient had bit tongue when episode happened. Patient is following commands at this time, no drift when raising arms or legs. No slurred speech or facial droop noted at this time. PERRL, 2mm. Upon moving patient from the hallway to a room, had an episode (approx. 2 minutes) where patient was staring at RN and was not responding to questions. MD Hernandez called, in room--A&Ox3 post episode. Patient is having intermittent episodes of lucidness. Denies vision changes, chest pain, shortness of breath, abdominal pain, nausea, vomiting, diarrhea, fevers, chills, dysuria, hematuria, recent illness travel or fall.

## 2020-10-23 NOTE — ED ADULT NURSE NOTE - NSIMPLEMENTINTERV_GEN_ALL_ED
Implemented All Fall Risk Interventions:  [place] to call system. Call bell, personal items and telephone within reach. Instruct patient to call for assistance. Room bathroom lighting operational. Non-slip footwear when patient is off stretcher. Physically safe environment: no spills, clutter or unnecessary equipment. Stretcher in lowest position, wheels locked, appropriate side rails in place. Provide visual cue, wrist band, yellow gown, etc. Monitor gait and stability. Monitor for mental status changes and reorient to person, place, and time. Review medications for side effects contributing to fall risk. Reinforce activity limits and safety measures with patient and family.

## 2020-10-23 NOTE — H&P ADULT - ASSESSMENT
INCOMPLETE  Assessment:  75y R-handed M with h/o HLD and HTN who presented to [facility] ED on 10/22/20 for sudden episode of impaired awareness preceded by apneic gasps with concern of possible seizure.  CT head w/o evidence of mass, hemorrhage or large stroke.  CTA H&N notable for enhanceming soft tissue mass at R. 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 On exam, he has blood from mouth with anterior tip tongue on right, has a mild stuttering dysarthria and has impaired short-term memory of today's events.     IMPRESSION: Transient episodes of apneic gasps followed by unresponsiveness and post-event confusion with DDx of seizure vs. transient cerebral hypoperfusion from carotid bulb impingement vs. catecholamine surge from jugular bulb suspected neuro-endocrine tumor.     Plan  [] Consult vascular surgery to evaluate for enhancing soft tissue mass at carotid bulb and for aortic aneurysms found on CTA H&N  [] 24 hr vEEG   [] MRI brain w/wo contrast with thin sections through temporal lobes.   [] VPA 20mg/kg loading dose given once due to two episodes within 1 hr.  Will decide on maintenance therapy if further evaluation demonstrates that these are in fact seizures or not.       Kishore Mesa, DO  PGY-3 Neurology Service     Assessment:  75y L-handed M with h/o HLD and HTN who presented to [facility] ED on 10/22/20 for sudden episode of impaired awareness preceded by apneic gasps with concern of possible seizure.  CT head unremarkable for any overt provoking factors.  CTA H&N notable for enhancing R. 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 On exam, he is somnolent but arouses easily, has impaired short term memory or memory of events, has a left anterior tip tongue bite and mild dysarthria.     1)Semiology/Description of event: Sudden gasps/agonal breathing followed by unresponsive episode, tongue bite and post-event confusion.     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  Though HSV encephalitis very unlikely given PT afebrile with supple neck and no recent confusion, gave one dose of valacyclovir empirically to cover for HSV    Plan  [] Consult vascular surgery to evaluate for enhancing soft tissue mass at carotid bulb and for aortic aneurysms found on CTA H&N  [] Urine metanephrine serum catecholamine levels   [] MRI brain w/wo contrast with thin sections through temporal lobes.   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   [] VPA 20mg/kg loading dose given once due to two episodes within 1 hr.   [] 24 hr vEEG -->Dependent on results and day team to decide if PT needs maintenance VPA   [] Low suspicion of HSV encephalitis.  Will discuss further with day team if PT requires LP and continued empiric coverage.     Rescue med: [] Ativan 2mg IVP for GTC and/or for seizure >3 min.  Please call provider first.     Kishore Mesa, DO  PGY-3 Neurology Service

## 2020-10-23 NOTE — H&P ADULT - NSHPPHYSICALEXAM_GEN_ALL_CORE
Vital Signs Last 24 Hrs  T(C): 37 (23 Oct 2020 04:43), Max: 37 (23 Oct 2020 04:43)  T(F): 98.6 (23 Oct 2020 04:43), Max: 98.6 (23 Oct 2020 04:43)  HR: 88 (23 Oct 2020 04:43) (72 - 88)  BP: 126/78 (23 Oct 2020 04:43) (117/82 - 154/94)  BP(mean): --  RR: 16 (23 Oct 2020 04:43) (16 - 21)  SpO2: 99% (23 Oct 2020 04:43) (90% - 100%)    Gen: Sitting up in bed, appears mildly uncomfortable, but in NAD.   HEENT: Normocephalic, atraumatic, tongue with tongue bite at frontal tip on left side. Dried blood from tongue bite on cheeks.   CV: RRR no murmurs rubs or gallops  Resp: CTA throughout  Neuro:   Mental Status: Somnolent but easily arouses to his name.  Oriented to self, situation, month and year but not date.  Follows commands but with some psychomotor slowing.  Impaired attention and impaired short term memory of today's events.    Language: Fluent with naming, repetition and comprehension intact but slight dysprosody to speech and word-finding difficulties.   Cranial Nerves: PERRLA (R = 3mm, L = 3mm). Visual fields intact. EOMI no nystagmus. V1-3 intact to light touch.  No facial asymmetry b/l, full eye closure strength b/l. Hearing grossly normal to conversation.  Symmetric palate elevation in midline.  Head turning & shoulder shrug intact b/l. Tongue midline, normal movements, no atrophy.  Motor: Normal muscle bulk & tone. No noticeable tremor, myoclonus or pronator drift. 5/5 strength throughout.	  Sensation: Symmetric B/L preserved sensation to light touch, pin prick, position.    Cortical: No extinction on double simultaneous touch and no signs of neglect.   Reflexes: 2/4 throughout.  Plantar Responses are downgoing B/L   Coordination: Intact rapid-alternating movements. No dysmetria on finger-to-nose or heel-to-shin.

## 2020-10-23 NOTE — CHART NOTE - NSCHARTNOTEFT_GEN_A_CORE
Patient with impaired attention and noted psychomotor slowing and unable to participate in filling out MRI screening form.  Phone number provided in chart is patient's number and no one answers phone  As per ED provider note, friend Joann Saenz ([phone]) is available to answer questions regarding patient.    MRI safety screen form and contrast form were discussed and filled out with Joann Saenz.  Noted patient had removable metal in the mouth.    All questions answered and concerns addressed.    Notified nursing regarding removable metal in the mouth and patient is to proceed with MRI studies today.

## 2020-10-23 NOTE — H&P ADULT - NSICDXFAMILYHX_GEN_ALL_CORE_FT
FAMILY HISTORY:  No pertinent family history in first degree relatives     FAMILY HISTORY:  FHx: lung cancer, Father due to tobacco use

## 2020-10-23 NOTE — H&P ADULT - ATTENDING COMMENTS
agree with history as above.  stereotyped events concerning for symptomatic SEIZURES,.  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 agree with above management    Plan: VEEG  Lp w/ infectious studies and paraneoplastic workup  MRI/MRA as above  ID consult  further management after these studies

## 2020-10-23 NOTE — H&P ADULT - NSHPLABSRESULTS_GEN_ALL_CORE
13.2   7.17  )-----------( 147      ( 23 Oct 2020 00:15 )             41.3     10-23  138  |  102  |  25<H>  ----------------------------<  132<H>  4.2   |  23  |  1.15    Ca    9.2      23 Oct 2020 00:15  Mg     2.0     10-23    TPro  6.7  /  Alb  4.3  /  TBili  0.4  /  DBili  x   /  AST  20  /  ALT  14  /  AlkPhos  44  10-23    PT/INR - ( 23 Oct 2020 00:15 )   PT: 11.2 sec;   INR: 0.93 ratio  PTT - ( 23 Oct 2020 00:15 )  PTT:21.4 sec    10/22/20 CT head w/o contrast: No CT evidence of acute intracranial hemorrhage, mass effect or acute territorial infarct.  10/22/20 CTA H&N   1. Patent cervical vasculature.  No hemodynamically significant carotid stenosis or flow-limiting vertebral artery stenosis.  No evidence of dissection.  2.  Hyperenhancing soft tissue located between the right jugular bulb and skull base segment of the right internal carotid artery with appears to demonstrate subtle erosion through the floor of the right middle ear cavity.  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 Follow-up MRI with contrast is recommended for further evaluation.  3. Borderline aneurysmal dilatation of the distal aortic arch/proximal descending thoracic aorta, measuring approximately 3.9 cm in caliber.  4. 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

## 2020-10-23 NOTE — CONSULT NOTE ADULT - ASSESSMENT
INCOMPLETE  Assessment:  75y R-handed M with h/o HLD and HTN who presented to [facility] ED on 10/22/20 for sudden episode of impaired awareness preceded by apneic gasps with concern of possible seizure.  CT head w/o evidence of mass, hemorrhage or large stroke.  CTA H&N notable for enhanceming soft tissue mass at R. 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 On exam, he has blood from mouth with anterior tip tongue on right, has a mild stuttering dysarthria and has impaired short-term memory of today's events.     IMPRESSION: Transient episode of apneic gasps a/w staring spell with DDx of seizure vs. transient cerebral hypoperfusion from carotid bulb impingement.     Plan  [] Consult vascular surgery to evaluate for enhancing soft tissue mass at carotid bulb and for aortic aneurysms found on CTA H&N  [] 24 hr vEEG   [] MRI brain w/wo contrast with thin sections through temporal lobes.     Kishore Mesa, DO  PGY-3 Neurology Service

## 2020-10-23 NOTE — PROCEDURE NOTE - NSINFORMCONSENT_GEN_A_CORE
Benefits, risks, and possible complications of procedure explained to patient/caregiver who verbalized understanding and gave written consent./Snow

## 2020-10-23 NOTE — CONSULT NOTE ADULT - ASSESSMENT
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    -recommend MRI head  -obtain urinary metanephrines  -will continue to follow  -d/w Dr. 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    - recommend MRI head  - obtain urinary metanephrines  - Would call skull base surgery team for potential treatment  - d/w Dr. 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    - recommend MRI head  - obtain urinary metanephrines  - Would call skull base surgery team for potential treatment  - d/w Dr. Lorenzo    No additional vascular surgery needs at this time. 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    - recommend MRI head  - obtain urinary metanephrines  - Given location of tumor no role for vascular surgery, would recommend skull base surgery for potential treatment (ENT vs neurosurgery)       Pt can follow up with Dr Lorenzo 2-4 weeks from discharge for surveillance off thoracic aortic aneurysm.   No additional vascular surgery needs at this time. Please re-page with questions    # [phone]
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
## 2020-10-23 NOTE — EEG REPORT - NS EEG TEXT BOX
[facility] Epilepsy Monitoring Unit Report  [facility]: [address] [facility] [address] [facility] , [address], [place] [address], Ph#: [phone] [facility]: [address] [place] [address], Ph#: [phone] Office: [address] [place] [address], [facility] [address], [place] [address] Ph#: [phone]  Patient Name: Brock Krause   Age: 75 year, : 1945 Patient ID: -, MRN #: [id number], Redd: EMU [id number] EMU [id number] Referring Physician: ER admit       Study Started: 5:37:29 AM on 10/23/2020 Duration: 2H 22min    Study Information:  EEG Recording Technique: The patient underwent continuous Video-EEG monitoring, using Telemetry System hardware on the XLTek Digital System. EEG and video data were stored on a computer hard drive with important events saved in digital archive files. The material was reviewed by a physician (electroencephalographer / epileptologist) on a daily basis. Frank and seizure detection algorithms were utilized and reviewed. An EEG Technician attended to the patient, and was available throughout daytime work hours.  The epilepsy center neurologist was available in person or on call 24-hours per day.  EEG Placement and Labeling of Electrodes: The EEG was performed utilizing 20 channel referential EEG connections (coronal over temporal over parasagittal montage) using all standard 10-20 electrode placements with EKG, with additional electrodes placed in the inferior temporal region using the modified 10-10 montage electrode placements for elective admissions, or if deemed necessary. Recording was at a sampling rate of 256 samples per second per channel. Time synchronized digital video recording was done simultaneously with EEG recording. A low light infrared camera was used for low light recording.   History: VEEG performed at bedside COR: A&Ox1 NO PHOTIC/HV performed due to age  74 y/o Male HO: HLD, HTN PW: Seizure like activity  Home Antiepileptic Medication and Device None  Interpretation:  Starting: Day 1      10/23/2020      Time: 5:37:29 AM Duration: 2H 22min  Daily EEG Visual Analysis FINDINGS:  The background was continuous, spontaneously variable and reactive. During wakefulness, the posterior dominant rhythm consisted of symmetric, well-modulated 8 Hz activity, with amplitude to 30 uV, that attenuated to eye opening.  Low amplitude frontal beta was noted in wakefulness.  Background Slowing: Intermittent diffuse theta activity in wakefulness.  Focal Slowing:  None were present.  Sleep Background: Drowsiness was characterized by fragmentation, attenuation, and slowing of the background activity.   Sleep was characterized by the presence of vertex waves, symmetric sleep spindles and K-complexes.  Other Non-Epileptiform Findings: None were present.  Interictal Epileptiform Activity:  None were present.  Events: Two subtle electrographic seizures at 06:43 and 07:42.  Clinically, patient with arousal from sleep with increased EMG artifact present.  No other clinical changes.  Electrographically, recording shows diffuse EMG artifact with subtle increase in rhythmic theta/delta activity over the left hemisphere with poor localization, evolving to rhythmic delta activity most prominent over the left fronto-temporal region with intermixed sharp-wave discharges at F7 prior to offset.           Artifacts: Intermittent myogenic and movement artifacts were noted.  ECG: The heart rate on single channel ECG was predominantly between 60-70 BPM.  EEG Summary: Abnormal EEG in the awake, drowsy and asleep states. - Two subtle left hemispheric electrographic seizures with unclear localization at onset evolving over the temporal region - Mild diffuse slowing  Impression/Clinical Correlate: Two subtle left hemispheric electrographic seizures with unclear localization at onset evolving over the temporal region.  Additional findings indicate non-specific mild diffuse or multifocal cerebral dysfunction.     ________________________	 Boin Porras M.D.			   Attending Physician, [facility] [place]   [facility] Epilepsy Monitoring Unit Report  [facility]: [address] [facility] , [address], [place] [address], Ph#: [phone] [facility]: [address] [place] [address], Ph#: [phone] Office: [address] [place] [address], [facility] [address], [place] [address] Ph#: [phone]  Patient Name: Brock Krause   Age: 75 year, : 1945 Patient ID: -, MRN #: [id number], Redd: EMU [id number] EMU [id number] Referring Physician: ER admit       Study Started: 5:37:29 AM on 10/23/2020 Duration: 2H 22min    Study Information:  EEG Recording Technique: The patient underwent continuous Video-EEG monitoring, using Telemetry System hardware on the XLTek Digital System. EEG and video data were stored on a computer hard drive with important events saved in digital archive files. The material was reviewed by a physician (electroencephalographer / epileptologist) on a daily basis. Frank and seizure detection algorithms were utilized and reviewed. An EEG Technician attended to the patient, and was available throughout daytime work hours.  The epilepsy center neurologist was available in person or on call 24-hours per day.  EEG Placement and Labeling of Electrodes: The EEG was performed utilizing 20 channel referential EEG connections (coronal over temporal over parasagittal montage) using all standard 10-20 electrode placements with EKG, with additional electrodes placed in the inferior temporal region using the modified 10-10 montage electrode placements for elective admissions, or if deemed necessary. Recording was at a sampling rate of 256 samples per second per channel. Time synchronized digital video recording was done simultaneously with EEG recording. A low light infrared camera was used for low light recording.   History: VEEG performed at bedside COR: A&Ox1 NO PHOTIC/HV performed due to age  76 y/o Male HO: HLD, HTN PW: Seizure like activity  Home Antiepileptic Medication and Device None  Interpretation:  Starting: Day 1      10/23/2020      Time: 5:37:29 AM Duration: 2H 22min  Daily EEG Visual Analysis FINDINGS:  The background was continuous, spontaneously variable and reactive. During wakefulness, the posterior dominant rhythm consisted of symmetric, well-modulated 8 Hz activity, with amplitude to 30 uV, that attenuated to eye opening.  Low amplitude frontal beta was noted in wakefulness.  Background Slowing: Intermittent diffuse theta activity in wakefulness.  Focal Slowing:  None were present.  Sleep Background: Drowsiness was characterized by fragmentation, attenuation, and slowing of the background activity.   Sleep was characterized by the presence of vertex waves, symmetric sleep spindles and K-complexes.  Other Non-Epileptiform Findings: None were present.  Interictal Epileptiform Activity:  None were present.  Events: Two subtle electrographic seizures at 06:43 and 07:42.  Clinically, patient with arousal from sleep with increased EMG artifact present.  No other clinical changes.  Electrographically, recording shows diffuse EMG artifact with subtle increase in rhythmic theta/delta activity over the left hemisphere with poor localization, evolving to rhythmic delta activity most prominent over the left fronto-temporal region with intermixed sharp-wave discharges at F7 prior to offset.  Duration 60-70 seconds.           Artifacts: Intermittent myogenic and movement artifacts were noted.  ECG: The heart rate on single channel ECG was predominantly between 60-70 BPM.  EEG Summary: Abnormal EEG in the awake, drowsy and asleep states. - Two subtle left hemispheric electrographic seizures with unclear localization at onset evolving over the temporal region - Mild diffuse slowing  Impression/Clinical Correlate: Two subtle left hemispheric electrographic seizures with unclear localization at onset evolving over the temporal region.  Additional findings indicate non-specific mild diffuse or multifocal cerebral dysfunction.     ________________________	 Boni Porras M.D.			   Attending Physician, [facility] [place]

## 2020-10-24 LAB
C3 SERPL-MCNC: 88 MG/DL — SIGNIFICANT CHANGE UP (ref 81–157)
C4 SERPL-MCNC: 2 MG/DL — LOW (ref 13–39)
HCV AB S/CO SERPL IA: 0.05 S/CO — SIGNIFICANT CHANGE UP (ref 0–0.99)
HCV AB SERPL-IMP: SIGNIFICANT CHANGE UP

## 2020-10-24 PROCEDURE — 99233 SBSQ HOSP IP/OBS HIGH 50: CPT | Mod: GC

## 2020-10-24 PROCEDURE — 95720 EEG PHY/QHP EA INCR W/VEEG: CPT

## 2020-10-24 PROCEDURE — 70543 MRI ORBT/FAC/NCK W/O &W/DYE: CPT | Mod: 26

## 2020-10-24 PROCEDURE — 76377 3D RENDER W/INTRP POSTPROCES: CPT | Mod: 26

## 2020-10-24 PROCEDURE — 70553 MRI BRAIN STEM W/O & W/DYE: CPT | Mod: 26

## 2020-10-24 RX ADMIN — ENOXAPARIN SODIUM 40 MILLIGRAM(S): 100 INJECTION SUBCUTANEOUS at 18:51

## 2020-10-24 RX ADMIN — LISINOPRIL 10 MILLIGRAM(S): 2.5 TABLET ORAL at 05:38

## 2020-10-24 RX ADMIN — LEVETIRACETAM 400 MILLIGRAM(S): 250 TABLET, FILM COATED ORAL at 05:39

## 2020-10-24 RX ADMIN — SIMVASTATIN 20 MILLIGRAM(S): 20 TABLET, FILM COATED ORAL at 21:35

## 2020-10-24 RX ADMIN — DIPHENHYDRAMINE HYDROCHLORIDE AND LIDOCAINE HYDROCHLORIDE AND ALUMINUM HYDROXIDE AND MAGNESIUM HYDRO 10 MILLILITER(S): KIT at 14:23

## 2020-10-24 RX ADMIN — DIPHENHYDRAMINE HYDROCHLORIDE AND LIDOCAINE HYDROCHLORIDE AND ALUMINUM HYDROXIDE AND MAGNESIUM HYDRO 10 MILLILITER(S): KIT at 21:36

## 2020-10-24 RX ADMIN — LEVETIRACETAM 400 MILLIGRAM(S): 250 TABLET, FILM COATED ORAL at 18:51

## 2020-10-24 RX ADMIN — DIPHENHYDRAMINE HYDROCHLORIDE AND LIDOCAINE HYDROCHLORIDE AND ALUMINUM HYDROXIDE AND MAGNESIUM HYDRO 10 MILLILITER(S): KIT at 05:40

## 2020-10-24 NOTE — EEG REPORT - NS EEG TEXT BOX
[facility] [place] Epilepsy Monitoring Unit Report      Starting: Day 2     10/24/2020      Time: 8:00 AM Duration: 24H  Daily EEG Visual Analysis FINDINGS:  The background was continuous, spontaneously variable and reactive. During wakefulness, the posterior dominant rhythm consisted of symmetric, well-modulated 8 Hz activity, with amplitude to 30 uV, that attenuated to eye opening.  Low amplitude frontal beta was noted in wakefulness.  Background Slowing: Intermittent diffuse theta activity in wakefulness.  Focal Slowing:  None were present.  Sleep Background: Drowsiness was characterized by fragmentation, attenuation, and slowing of the background activity.   Sleep was characterized by the presence of vertex waves, symmetric sleep spindles and K-complexes.  Other Non-Epileptiform Findings: None were present.  Interictal Epileptiform Activity:  There were rare left anterior temporal sharp waves  Events: None  Artifacts: Intermittent myogenic and movement artifacts were noted.  ECG: The heart rate on single channel ECG was predominantly between 60-70 BPM.  EEG Summary: Abnormal EEG in the awake, drowsy and asleep states. - left temporal sharp waves - Mild diffuse slowing  Impression/Clinical Correlate: There is evidence in current recording of left temporal cortical irritability No further seizures seen in current recording.  Two subtle left hemispheric electrographic seizures with unclear localization at onset evolving over the temporal region were seen and reported 10/23. Additional findings indicate non-specific mild diffuse or multifocal cerebral dysfunction.  Panfilo Mariano MD Attending Physician, [facility] [place]

## 2020-10-24 NOTE — PROGRESS NOTE ADULT - SUBJECTIVE AND OBJECTIVE BOX
SUBJECTIVE: No events overnight    MEDICATIONS (HOME):  Home Medications:  lisinopril 10 mg oral tablet: 1 tab(s) orally once a day (10 Apr 2020 13:25)  simvastatin 20 mg oral tablet: 1 tab(s) orally once a day (at bedtime) (10 Apr 2020 13:25)  valacyclin: 1 spray(s) orally once a day, As Needed for cold sores/post-herpetic neuralgia? (23 Oct 2020 03:59)    MEDICATIONS  (STANDING):  enoxaparin Injectable 40 milliGRAM(s) SubCutaneous daily  FIRST- Mouthwash  BLM 10 milliLiter(s) Swish and Spit three times a day  levETIRAcetam  IVPB 500 milliGRAM(s) IV Intermittent every 12 hours  lidocaine 1% Injectable 50 milliLiter(s) Local Injection once  lisinopril 10 milliGRAM(s) Oral daily  simvastatin 20 milliGRAM(s) Oral at bedtime  sodium chloride 0.9%. 1000 milliLiter(s) (50 mL/Hr) IV Continuous <Continuous>    MEDICATIONS  (PRN):  acetaminophen   Tablet .. 650 milliGRAM(s) Oral every 6 hours PRN Moderate Pain (4 - 6)  LORazepam   Injectable 2 milliGRAM(s) IV Push once PRN seizure > 3 mins  naproxen 250 milliGRAM(s) Oral every 6 hours PRN Moderate Pain (4 - 6)    ALLERGIES/INTOLERANCES:  Allergies  No Known Allergies    Intolerances    VITALS & EXAMINATION:  Vital Signs Last 24 Hrs  T(C): 36.7 (24 Oct 2020 13:31), Max: 36.7 (23 Oct 2020 21:14)  T(F): 98 (24 Oct 2020 13:31), Max: 98.1 (23 Oct 2020 21:14)  HR: 83 (24 Oct 2020 13:31) (68 - 83)  BP: 108/68 (24 Oct 2020 13:31) (108/68 - 121/72)  BP(mean): --  RR: 18 (24 Oct 2020 13:31) (18 - 18)  SpO2: 97% (24 Oct 2020 13:31) (95% - 98%)    Neurological Exam:    MS: Awake, alert. Normal affect. Follows all commands, answering questions    Language: Speech is clear, fluent with good comprehension on first exam, on second exam a few hours later he had difficulty with speech production, stuttering to finish a sentence.    CNs: eyes moving spontaneously in all directions. well developed masseter muscles b/l. No facial asymmetry b/l. Symmetric palate elevation in midline. Gag reflex deferred. Tongue midline, normal movements, no atrophy.    Motor: Normal muscle bulk & tone. No noticeable tremor or seizure. No pronator drift.    Moving all extremities equally without laterality      LABORATORY:  CBC                       13.2   7.17  )-----------( 147      ( 23 Oct 2020 00:15 )             41.3     Chem 10-23    138  |  102  |  25<H>  ----------------------------<  132<H>  4.2   |  23  |  1.15    Ca    9.2      23 Oct 2020 00:15  Mg     2.0     10-23    TPro  6.7  /  Alb  4.3  /  TBili  0.4  /  DBili  x   /  AST  20  /  ALT  14  /  AlkPhos  44  10-23    LFTs LIVER FUNCTIONS - ( 23 Oct 2020 00:15 )  Alb: 4.3 g/dL / Pro: 6.7 g/dL / ALK PHOS: 44 U/L / ALT: 14 U/L / AST: 20 U/L / GGT: x           Coagulopathy PT/INR - ( 23 Oct 2020 00:15 )   PT: 11.2 sec;   INR: 0.93 ratio         PTT - ( 23 Oct 2020 00:15 )  PTT:21.4 sec  Lipid Panel   A1c   Cardiac enzymes CARDIAC MARKERS ( 23 Oct 2020 00:15 )  x     / x     / 181 U/L / x     / x          U/A Urinalysis Basic - ( 23 Oct 2020 00:51 )    Color: Light Yellow / Appearance: Clear / S.033 / pH: x  Gluc: x / Ketone: Trace  / Bili: Negative / Urobili: Negative   Blood: x / Protein: 30 mg/dL / Nitrite: Negative   Leuk Esterase: Negative / RBC: 2 /hpf / WBC 1 /HPF   Sq Epi: x / Non Sq Epi: 1 /hpf / Bacteria: Negative

## 2020-10-24 NOTE — PROGRESS NOTE ADULT - ASSESSMENT
Assessment:  75y L-handed M with h/o HLD and HTN who presented to [facility] ED on 10/22/20 for sudden episode of impaired awareness preceded by apneic gasps with concern of possible seizure.  CT head unremarkable for any overt provoking factors.  CTA H&N notable for enhancing R. 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 On exam, he is somnolent but arouses easily, has impaired short term memory or memory of events, has a left anterior tip tongue bite and mild dysarthria.     1)Semiology/Description of event: Sudden gasps/agonal breathing followed by unresponsive episode, tongue bite and post-event confusion.     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  Though HSV encephalitis very unlikely given PT afebrile with supple neck and no recent confusion, given one dose of valacyclovir empirically to cover for HSV on admission.     Plan  [] vascular surgery consult appreciated  [] CSF HSV negative  [] Urine metanephrine serum catecholamine levels  [] MRI brain w/wo contrast with thin sections through temporal lobes.   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   [] Keppra 500 BID  [] 24 hr vEEG, no AEDs for now      Rescue med: [] Ativan 2mg IVP for GTC and/or for seizure >3 min.  Please call provider first.

## 2020-10-24 NOTE — EEG REPORT - NS EEG HISTORY CONTINUOUS VIDEO EEG SUG
GASTROINTESTINAL - ADULT    • Minimal or absence of nausea and vomiting Progressing    • Maintains or returns to baseline bowel function Progressing        PAIN - ADULT    • Verbalizes/displays adequate comfort level or patient's stated pain goal Progressi characterization of seizures/diagnostic evaluation of paraoxy events/evaluation of mental status change

## 2020-10-25 ENCOUNTER — TRANSCRIPTION ENCOUNTER (OUTPATIENT)
Age: 75
End: 2020-10-25

## 2020-10-25 VITALS
TEMPERATURE: 98 F | RESPIRATION RATE: 18 BRPM | DIASTOLIC BLOOD PRESSURE: 96 MMHG | HEART RATE: 76 BPM | OXYGEN SATURATION: 98 % | SYSTOLIC BLOOD PRESSURE: 160 MMHG

## 2020-10-25 PROCEDURE — 82945 GLUCOSE OTHER FLUID: CPT

## 2020-10-25 PROCEDURE — A9585: CPT

## 2020-10-25 PROCEDURE — 81001 URINALYSIS AUTO W/SCOPE: CPT

## 2020-10-25 PROCEDURE — 70498 CT ANGIOGRAPHY NECK: CPT

## 2020-10-25 PROCEDURE — 86362 MOG-IGG1 ANTB CBA EACH: CPT

## 2020-10-25 PROCEDURE — 88184 FLOWCYTOMETRY/ TC 1 MARKER: CPT

## 2020-10-25 PROCEDURE — 87070 CULTURE OTHR SPECIMN AEROBIC: CPT

## 2020-10-25 PROCEDURE — 86341 ISLET CELL ANTIBODY: CPT

## 2020-10-25 PROCEDURE — 84157 ASSAY OF PROTEIN OTHER: CPT

## 2020-10-25 PROCEDURE — 86255 FLUORESCENT ANTIBODY SCREEN: CPT

## 2020-10-25 PROCEDURE — 83605 ASSAY OF LACTIC ACID: CPT

## 2020-10-25 PROCEDURE — 82947 ASSAY GLUCOSE BLOOD QUANT: CPT

## 2020-10-25 PROCEDURE — 85730 THROMBOPLASTIN TIME PARTIAL: CPT

## 2020-10-25 PROCEDURE — 99285 EMERGENCY DEPT VISIT HI MDM: CPT | Mod: 25

## 2020-10-25 PROCEDURE — 89051 BODY FLUID CELL COUNT: CPT

## 2020-10-25 PROCEDURE — 83873 ASSAY OF CSF PROTEIN: CPT

## 2020-10-25 PROCEDURE — 96375 TX/PRO/DX INJ NEW DRUG ADDON: CPT | Mod: XU

## 2020-10-25 PROCEDURE — 85025 COMPLETE CBC W/AUTO DIFF WBC: CPT

## 2020-10-25 PROCEDURE — 70553 MRI BRAIN STEM W/O & W/DYE: CPT

## 2020-10-25 PROCEDURE — 83735 ASSAY OF MAGNESIUM: CPT

## 2020-10-25 PROCEDURE — 88185 FLOWCYTOMETRY/TC ADD-ON: CPT

## 2020-10-25 PROCEDURE — 70543 MRI ORBT/FAC/NCK W/O &W/DYE: CPT

## 2020-10-25 PROCEDURE — 87483 CNS DNA AMP PROBE TYPE 12-25: CPT

## 2020-10-25 PROCEDURE — 86403 PARTICLE AGGLUT ANTBDY SCRN: CPT

## 2020-10-25 PROCEDURE — 70450 CT HEAD/BRAIN W/O DYE: CPT

## 2020-10-25 PROCEDURE — 82962 GLUCOSE BLOOD TEST: CPT

## 2020-10-25 PROCEDURE — 99238 HOSP IP/OBS DSCHRG MGMT 30/<: CPT

## 2020-10-25 PROCEDURE — 93005 ELECTROCARDIOGRAM TRACING: CPT

## 2020-10-25 PROCEDURE — 95715 VEEG EA 12-26HR INTMT MNTR: CPT

## 2020-10-25 PROCEDURE — 83519 RIA NONANTIBODY: CPT

## 2020-10-25 PROCEDURE — 80053 COMPREHEN METABOLIC PANEL: CPT

## 2020-10-25 PROCEDURE — 85018 HEMOGLOBIN: CPT

## 2020-10-25 PROCEDURE — 85014 HEMATOCRIT: CPT

## 2020-10-25 PROCEDURE — 84295 ASSAY OF SERUM SODIUM: CPT

## 2020-10-25 PROCEDURE — 86803 HEPATITIS C AB TEST: CPT

## 2020-10-25 PROCEDURE — 80307 DRUG TEST PRSMV CHEM ANLYZR: CPT

## 2020-10-25 PROCEDURE — 87529 HSV DNA AMP PROBE: CPT

## 2020-10-25 PROCEDURE — 82787 IGG 1 2 3 OR 4 EACH: CPT

## 2020-10-25 PROCEDURE — 95712 VEEG 2-12 HR INTMT MNTR: CPT

## 2020-10-25 PROCEDURE — 84166 PROTEIN E-PHORESIS/URINE/CSF: CPT

## 2020-10-25 PROCEDURE — 82550 ASSAY OF CK (CPK): CPT

## 2020-10-25 PROCEDURE — 87040 BLOOD CULTURE FOR BACTERIA: CPT

## 2020-10-25 PROCEDURE — 82330 ASSAY OF CALCIUM: CPT

## 2020-10-25 PROCEDURE — 82164 ANGIOTENSIN I ENZYME TEST: CPT

## 2020-10-25 PROCEDURE — 95718 EEG PHYS/QHP 2-12 HR W/VEEG: CPT

## 2020-10-25 PROCEDURE — U0003: CPT

## 2020-10-25 PROCEDURE — 86160 COMPLEMENT ANTIGEN: CPT

## 2020-10-25 PROCEDURE — 96374 THER/PROPH/DIAG INJ IV PUSH: CPT | Mod: XU

## 2020-10-25 PROCEDURE — 82803 BLOOD GASES ANY COMBINATION: CPT

## 2020-10-25 PROCEDURE — 83615 LACTATE (LD) (LDH) ENZYME: CPT

## 2020-10-25 PROCEDURE — 76377 3D RENDER W/INTRP POSTPROCES: CPT

## 2020-10-25 PROCEDURE — 84132 ASSAY OF SERUM POTASSIUM: CPT

## 2020-10-25 PROCEDURE — 87205 SMEAR GRAM STAIN: CPT

## 2020-10-25 PROCEDURE — 95700 EEG CONT REC W/VID EEG TECH: CPT

## 2020-10-25 PROCEDURE — 85610 PROTHROMBIN TIME: CPT

## 2020-10-25 PROCEDURE — 70496 CT ANGIOGRAPHY HEAD: CPT

## 2020-10-25 PROCEDURE — 82435 ASSAY OF BLOOD CHLORIDE: CPT

## 2020-10-25 RX ORDER — LEVETIRACETAM 250 MG/1
1 TABLET, FILM COATED ORAL
Qty: 120 | Refills: 0
Start: 2020-10-25 | End: 2020-11-23

## 2020-10-25 RX ADMIN — LISINOPRIL 10 MILLIGRAM(S): 2.5 TABLET ORAL at 05:50

## 2020-10-25 RX ADMIN — LEVETIRACETAM 400 MILLIGRAM(S): 250 TABLET, FILM COATED ORAL at 05:50

## 2020-10-25 RX ADMIN — DIPHENHYDRAMINE HYDROCHLORIDE AND LIDOCAINE HYDROCHLORIDE AND ALUMINUM HYDROXIDE AND MAGNESIUM HYDRO 10 MILLILITER(S): KIT at 05:50

## 2020-10-25 NOTE — EEG REPORT - NS EEG TEXT BOX
[facility] [place] Epilepsy Monitoring Unit Report      Starting: Day 4     10/25/2020      Time: 8:00 AM Duration: 4h 58m  Daily EEG Visual Analysis FINDINGS:  The background was continuous, spontaneously variable and reactive. During wakefulness, the posterior dominant rhythm consisted of symmetric, well-modulated 8 Hz activity, with amplitude to 30 uV, that attenuated to eye opening.  Low amplitude frontal beta was noted in wakefulness.  Background Slowing: none  Focal Slowing:  None were present.  Sleep Background: Drowsiness was characterized by fragmentation, attenuation, and slowing of the background activity.   Sleep was characterized by the presence of vertex waves, symmetric sleep spindles and K-complexes.  Other Non-Epileptiform Findings: None were present.  Interictal Epileptiform Activity:  There were rare left anterior temporal sharp waves  Events: None  Artifacts: Intermittent myogenic and movement artifacts were noted.  ECG: The heart rate on single channel ECG was predominantly between 60-70 BPM.  EEG Summary: Abnormal EEG in the awake, drowsy and asleep states. - left temporal sharp waves   Impression/Clinical Correlate: There is evidence in current recording of left temporal cortical irritability No further seizures seen in current recording.  Two subtle left hemispheric electrographic seizures with unclear localization at onset evolving over the temporal region were seen and reported 10/23.  Panfilo Mariano MD Attending Physician, [facility] [place]   [facility] Epilepsy [place] Epilepsy Monitoring Unit Report      Starting: Day 4     10/25/2020      Time: 8:00 AM Duration: 5h 21m  Daily EEG Visual Analysis FINDINGS:  The background was continuous, spontaneously variable and reactive. During wakefulness, the posterior dominant rhythm consisted of symmetric, well-modulated 8 Hz activity, with amplitude to 30 uV, that attenuated to eye opening.  Low amplitude frontal beta was noted in wakefulness.  Background Slowing: none  Focal Slowing:  None were present.  Sleep Background: Drowsiness was characterized by fragmentation, attenuation, and slowing of the background activity.   Sleep was characterized by the presence of vertex waves, symmetric sleep spindles and K-complexes.  Other Non-Epileptiform Findings: None were present.  Interictal Epileptiform Activity:  There were rare left anterior temporal sharp waves  Events: None  Artifacts: Intermittent myogenic and movement artifacts were noted.  ECG: The heart rate on single channel ECG was predominantly between 60-70 BPM.  EEG Summary: Abnormal EEG in the awake, drowsy and asleep states. - left temporal sharp waves   Impression/Clinical Correlate: There is evidence in current recording of left temporal cortical irritability No further seizures seen in current recording.  Two subtle left hemispheric electrographic seizures with unclear localization at onset evolving over the temporal region were seen and reported 10/23.  Panfilo Mariano MD Attending Physician, [facility] Epilepsy [place]

## 2020-10-25 NOTE — PROGRESS NOTE ADULT - SUBJECTIVE AND OBJECTIVE BOX
SUBJECTIVE: Patient interviewed and examined at the bedside on the morning of 10/25/20. No events overnight    MEDICATIONS (HOME):  Home Medications:  lisinopril 10 mg oral tablet: 1 tab(s) orally once a day (10 Apr 2020 13:25)  simvastatin 20 mg oral tablet: 1 tab(s) orally once a day (at bedtime) (10 Apr 2020 13:25)  valacyclin: 1 spray(s) orally once a day, As Needed for cold sores/post-herpetic neuralgia? (23 Oct 2020 03:59)    MEDICATIONS  (STANDING):  enoxaparin Injectable 40 milliGRAM(s) SubCutaneous daily  FIRST- Mouthwash  BLM 10 milliLiter(s) Swish and Spit three times a day  levETIRAcetam  IVPB 500 milliGRAM(s) IV Intermittent every 12 hours  lidocaine 1% Injectable 50 milliLiter(s) Local Injection once  lisinopril 10 milliGRAM(s) Oral daily  simvastatin 20 milliGRAM(s) Oral at bedtime  sodium chloride 0.9%. 1000 milliLiter(s) (50 mL/Hr) IV Continuous <Continuous>    MEDICATIONS  (PRN):  acetaminophen   Tablet .. 650 milliGRAM(s) Oral every 6 hours PRN Moderate Pain (4 - 6)  LORazepam   Injectable 2 milliGRAM(s) IV Push once PRN seizure > 3 mins  naproxen 250 milliGRAM(s) Oral every 6 hours PRN Moderate Pain (4 - 6)    ALLERGIES/INTOLERANCES:  Allergies  No Known Allergies    Intolerances        CAPILLARY BLOOD GLUCOSE      I&O's Summary    Vital Signs Last 24 Hrs  T(C): 36.6 (25 Oct 2020 08:29), Max: 36.8 (24 Oct 2020 23:46)  T(F): 97.8 (25 Oct 2020 08:29), Max: 98.2 (24 Oct 2020 23:46)  HR: 70 (25 Oct 2020 08:29) (65 - 83)  BP: 128/80 (25 Oct 2020 08:29) (108/68 - 135/83)  BP(mean): --  RR: 18 (25 Oct 2020 08:29) (18 - 18)  SpO2: 93% (25 Oct 2020 08:29) (93% - 97%)      Neurological Exam:    MS: Awake, alert. Normal affect. Follows all commands, answering questions    Language: Speech is clear, fluent with good comprehension and repetition.    CNs: eyes moving spontaneously in all directions. well developed masseter muscles b/l. No facial asymmetry b/l, occasional R facial twitches. Symmetric palate elevation in midline. Gag reflex deferred. Tongue midline, normal movements, no atrophy.    Motor: Normal muscle bulk & tone. No noticeable tremor or seizure. No pronator drift.    Moving all extremities equally without laterality       from: MR Brain-Seizure, Epilepsy w/wo IV Cont (10.24.20 @ 21:07)   Comparison is made with the prior CT of 10/23/2020.    Ventricular and sulcal prominence is consistent with age-appropriate involutional change. Small vessel white matter ischemic changes are noted. No acute infarcts are seen. There is no new hemorrhage. After contrast administration there is normal intracranial vascular enhancement. No abnormal parenchymal or leptomeningeal enhancement is identified.    No abnormal signal in the temporal lobes is identified.    The sellar and parasellar structures are unremarkable.      Impression: Age-appropriate involutional and ischemic gliotic changes. No acute infarcts, hemorrhage or mass. No abnormal enhancement.        from: MR Neck Soft Tissue Only w/wo IV Cont (10.24.20 @ 21:06)   Comparison is made with the prior CTA 10/23/2020 and the MRI of the brain obtained concurrently.    There is no mass in the region of the right jugular bulb. The apparent mass seen on the CTA likely was due to incomplete opacification of the jugular veins on the arterial phase of the CT angiogram. Normal jugular venous flow is identified.    Soft tissues of theneck are within normal limits. The salivary glands appear normal. There are no masses. The airway is unremarkable.    After contrast administration there is normal vascular enhancement.      IMPRESSION: Unremarkable MRI of the neck soft tissues. Normal right jugular vein. No evidence of paraganglioma.          10/24/20 EEG:    EEG Summary:  Abnormal EEG in the awake, drowsy and asleep states.  - left temporal sharp waves  - Mild diffuse slowing    Impression/Clinical Correlate:  There is evidence in current recording of left temporal cortical irritability No further seizures seen in current recording.  Two subtle left hemispheric electrographic seizures with unclear localization at onset evolving over the temporal region were seen and reported 10/23. Additional findings indicate non-specific mild diffuse or multifocal cerebral dysfunction.

## 2020-10-25 NOTE — DISCHARGE NOTE PROVIDER - CARE PROVIDERS DIRECT ADDRESSES
[email or web address]_Unknown

## 2020-10-25 NOTE — DISCHARGE NOTE PROVIDER - CARE PROVIDER_API CALL
Panfilo Mariano  CLINICAL NEUROPHYSIOLOGY  [address], [address]  [place] [address]  Phone: [phone]  Fax: [phone]  Follow Up Time: 2 weeks    Toya Lorenzo (MD; MS)  Vascular Surgery  [address], [address]  [place] [address]  Phone: [phone]  Fax: [phone]  Follow Up Time: 1 month

## 2020-10-25 NOTE — DISCHARGE NOTE NURSING/CASE MANAGEMENT/SOCIAL WORK - PATIENT PORTAL LINK FT
You can access the FollowMyHealth Patient Portal offered by [facility] by registering at the following website: [email or web address]. By joining [employer]’s FollowMyHealth portal, you will also be able to view your health information using other applications (apps) compatible with our system.

## 2020-10-25 NOTE — EEG REPORT - NS EEG HISTORY CONTINUOUS VIDEO EEG SUG
diagnostic evaluation of paraoxy events/evaluation of mental status change/characterization of seizures

## 2020-10-25 NOTE — DISCHARGE NOTE PROVIDER - PROVIDER TOKENS
PROVIDER:[TOKEN:[[id number]],FOLLOWUP:[2 weeks]],PROVIDER:[TOKEN:[[id number]:[id number]],FOLLOWUP:[1 month]]

## 2020-10-25 NOTE — DISCHARGE NOTE PROVIDER - NSDCCPCAREPLAN_GEN_ALL_CORE_FT
PRINCIPAL DISCHARGE DIAGNOSIS  Diagnosis: Transient alteration of awareness  Assessment and Plan of Treatment: This is secondary to seizures arising from the L temporal lobe.  This is being treated with Keppra 500mg bid po.  You were informed in accordance with [facility] law about being prohibited from driving until you are at least 12 months seizure free from time of onset  You are to follow up with Dr. Mariano in the outpatient setting at [address] [place] [address], [place] [id number]

## 2020-10-25 NOTE — DISCHARGE NOTE PROVIDER - NSDCMRMEDTOKEN_GEN_ALL_CORE_FT
Keppra 500 mg oral tablet: 1 tab(s) orally 2 times a day  lisinopril 10 mg oral tablet: 1 tab(s) orally once a day  simvastatin 20 mg oral tablet: 1 tab(s) orally once a day (at bedtime)  valacyclin: 1 spray(s) orally once a day, As Needed for cold sores/post-herpetic neuralgia?

## 2020-10-25 NOTE — EEG REPORT - NS EEG TEXT BOX
[facility] Epilepsy [place] Epilepsy Monitoring Unit Report      Starting: Day 3     10/24/2020      Time: 8:00 AM Duration: 17h 36m  Daily EEG Visual Analysis FINDINGS:  The background was continuous, spontaneously variable and reactive. During wakefulness, the posterior dominant rhythm consisted of symmetric, well-modulated 8 Hz activity, with amplitude to 30 uV, that attenuated to eye opening.  Low amplitude frontal beta was noted in wakefulness.  Background Slowing: none  Focal Slowing:  None were present.  Sleep Background: Drowsiness was characterized by fragmentation, attenuation, and slowing of the background activity.   Sleep was characterized by the presence of vertex waves, symmetric sleep spindles and K-complexes.  Other Non-Epileptiform Findings: None were present.  Interictal Epileptiform Activity:  There were rare left anterior temporal sharp waves  Events: None  Artifacts: Intermittent myogenic and movement artifacts were noted.  ECG: The heart rate on single channel ECG was predominantly between 60-70 BPM.  EEG Summary: Abnormal EEG in the awake, drowsy and asleep states. - left temporal sharp waves   Impression/Clinical Correlate: There is evidence in current recording of left temporal cortical irritability No further seizures seen in current recording.  Two subtle left hemispheric electrographic seizures with unclear localization at onset evolving over the temporal region were seen and reported 10/23.  Panfilo Mariano MD Attending Physician, [facility] [place]
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
## 2020-10-26 PROBLEM — Z00.00 ENCOUNTER FOR PREVENTIVE HEALTH EXAMINATION: Status: ACTIVE | Noted: 2020-10-26

## 2020-10-26 LAB
ALBUMIN CSF-MCNC: 15.2 MG/DL — SIGNIFICANT CHANGE UP (ref 14–25)
ALBUMIN SERPL ELPH-MCNC: 3510 MG/DL — SIGNIFICANT CHANGE UP (ref 3500–5200)
CULTURE RESULTS: NO GROWTH — SIGNIFICANT CHANGE UP
IGG CSF-MCNC: 1.8 MG/DL — SIGNIFICANT CHANGE UP
IGG FLD-MCNC: 809 MG/DL — SIGNIFICANT CHANGE UP (ref 610–1660)
IGG SERPL-MCNC: 816 MG/DL — SIGNIFICANT CHANGE UP (ref 603–1613)
IGG SYNTH RATE SER+CSF CALC-MRATE: -1.9 MG/DAY — SIGNIFICANT CHANGE UP
IGG/ALB CLEAR SER+CSF-RTO: 0.5 — SIGNIFICANT CHANGE UP
IGG/ALB CSF: 0.12 RATIO — SIGNIFICANT CHANGE UP
IGG/ALB SER: 0.23 RATIO — SIGNIFICANT CHANGE UP
IGG1 SER-MCNC: 369 MG/DL — SIGNIFICANT CHANGE UP (ref 248–810)
IGG2 SER-MCNC: 210 MG/DL — SIGNIFICANT CHANGE UP (ref 130–555)
IGG3 SER-MCNC: 23 MG/DL — SIGNIFICANT CHANGE UP (ref 15–102)
IGG4 SER-MCNC: 62 MG/DL — SIGNIFICANT CHANGE UP (ref 2–96)
PROT CSF-MCNC: 28 MG/DL — SIGNIFICANT CHANGE UP (ref 15–45)
SPECIMEN SOURCE: SIGNIFICANT CHANGE UP

## 2020-10-27 RX ORDER — DIVALPROEX SODIUM 500 MG/1
1 TABLET, DELAYED RELEASE ORAL
Qty: 60 | Refills: 0
Start: 2020-10-27

## 2020-10-27 RX ORDER — LEVETIRACETAM 250 MG/1
1 TABLET, FILM COATED ORAL
Qty: 60 | Refills: 0
Start: 2020-10-27

## 2020-10-27 NOTE — CHART NOTE - NSCHARTNOTEFT_GEN_A_CORE
Patient called and stated that he had a rash and he is itching.    Advised that rash is not a typical side effect of Keppra. He may take Benadryl to control his symptoms of itching and rash and advised that if his rash worsens or begins to develop any throat closing then he should immediately present to his nearest emergency room.  Otherwise, sent Keppra 250mg bid and Depakote 250mg and advised not to take Keppra 500mg.   Advised to follow up with Dr. Mariano in 1-2 weeks.    If his condition worsens, was advised to present to his nearest emergency room.

## 2020-10-28 LAB
CULTURE RESULTS: SIGNIFICANT CHANGE UP
CULTURE RESULTS: SIGNIFICANT CHANGE UP
MBP CSF-MCNC: <2 MCG/L — SIGNIFICANT CHANGE UP (ref 2–4)
MOG AB CSF QL CBA IFA: NEGATIVE — SIGNIFICANT CHANGE UP
SPECIMEN SOURCE: SIGNIFICANT CHANGE UP
SPECIMEN SOURCE: SIGNIFICANT CHANGE UP
TM INTERPRETATION: SIGNIFICANT CHANGE UP

## 2020-10-29 LAB
OLIGOCLONAL BANDS CSF ELPH-IMP: SIGNIFICANT CHANGE UP

## 2020-10-30 LAB — INNER EAR 68KD AB FLD QL: <1.5 U/L — SIGNIFICANT CHANGE UP (ref 0–3.1)

## 2020-11-02 ENCOUNTER — APPOINTMENT (OUTPATIENT)
Dept: NEUROLOGY | Facility: CLINIC | Age: 75
End: 2020-11-02
Payer: COMMERCIAL

## 2020-11-02 VITALS
SYSTOLIC BLOOD PRESSURE: 128 MMHG | HEART RATE: 71 BPM | DIASTOLIC BLOOD PRESSURE: 83 MMHG | WEIGHT: 193 LBS | BODY MASS INDEX: 25.58 KG/M2 | HEIGHT: 73 IN

## 2020-11-02 VITALS — TEMPERATURE: 98 F

## 2020-11-02 PROCEDURE — 99072 ADDL SUPL MATRL&STAF TM PHE: CPT

## 2020-11-02 PROCEDURE — 99214 OFFICE O/P EST MOD 30 MIN: CPT

## 2020-11-03 LAB
AMPA-R AB CBA, CSF: NEGATIVE — SIGNIFICANT CHANGE UP
AMPHIPHYSIN AB TITR CSF: NEGATIVE TITER — SIGNIFICANT CHANGE UP
CASPR2-IGG CBA, CSF: NEGATIVE — SIGNIFICANT CHANGE UP
CV2 IGG TITR CSF: NEGATIVE TITER — SIGNIFICANT CHANGE UP
DPPX ANTIBODY IFA, CSF: NEGATIVE — SIGNIFICANT CHANGE UP
GABA-B-R AB CBA, CSF: NEGATIVE — SIGNIFICANT CHANGE UP
GAD65 AB CSF-SCNC: 0 NMOL/L — SIGNIFICANT CHANGE UP
GFAP IFA, CSF: NEGATIVE — SIGNIFICANT CHANGE UP
GLIAL NUC TYPE 1 AB TITR CSF: NEGATIVE TITER — SIGNIFICANT CHANGE UP
HU1 AB TITR CSF IF: NEGATIVE TITER — SIGNIFICANT CHANGE UP
HU2 AB TITR CSF IF: NEGATIVE TITER — SIGNIFICANT CHANGE UP
HU3 AB TITR CSF: NEGATIVE TITER — SIGNIFICANT CHANGE UP
IMMUNOLOGIST REVIEW: SIGNIFICANT CHANGE UP
LGI1-IGG CBA, CSF: NEGATIVE — SIGNIFICANT CHANGE UP
MGLUR1 AB IFA, CSF: NEGATIVE — SIGNIFICANT CHANGE UP
NMDA-R AB CBA, CSF: NEGATIVE — SIGNIFICANT CHANGE UP
PARANEOPLASTIC AB PNL SER: SIGNIFICANT CHANGE UP
PCA-TR AB TITR CSF: NEGATIVE TITER — SIGNIFICANT CHANGE UP
PURKINJE CELL CYTOPLASMIC AB TYPE 2: NEGATIVE TITER — SIGNIFICANT CHANGE UP
PURKINJE CELLS AB TITR CSF IF: NEGATIVE TITER — SIGNIFICANT CHANGE UP
REFLEX ADDED: SIGNIFICANT CHANGE UP

## 2020-11-05 NOTE — ASSESSMENT
[FreeTextEntry1] : Mr. RODRIGUEZ presented recently to [facility] EMU with new onset seizures, found to have seizures on EEG.  Did not tolerate levetiracetam, now getting divalproex \par \par Plan\par 1. continue divalproex at current dose - if breakthrough seizure occurs, will need dose increase to 500 q12.\par 2. RTC 2 mo \par \par -patient will receive information on mindfulness and basic exercise useful for stress reduction with goal of improved seizure control\par \par I have spent 25 minutes of face to face time with the patient reviewing the cause of seizures or seizure-like events, assessing the risk of recurrence, educating the patient or family to recognize seizures, and discussing possible treatment options and possible side effects of seizure medications. I also discussed seizure safety, and ways of reducing seizure risk. Greater than 50% of the encounter time was spent on counseling and coordination of care for reviewing records in [employer], discussion with patient regarding plan.
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
## 2020-11-17 ENCOUNTER — APPOINTMENT (OUTPATIENT)
Dept: VASCULAR SURGERY | Facility: CLINIC | Age: 75
End: 2020-11-17
Payer: MEDICARE

## 2020-11-17 VITALS
WEIGHT: 185 LBS | SYSTOLIC BLOOD PRESSURE: 135 MMHG | BODY MASS INDEX: 24.52 KG/M2 | HEART RATE: 69 BPM | TEMPERATURE: 97.8 F | HEIGHT: 73 IN | DIASTOLIC BLOOD PRESSURE: 83 MMHG

## 2020-11-17 PROCEDURE — 99202 OFFICE O/P NEW SF 15 MIN: CPT

## 2020-11-17 NOTE — PHYSICAL EXAM
[Normal Breath Sounds] : Normal breath sounds [Normal Heart Sounds] : normal heart sounds [2+] : left 2+

## 2020-11-17 NOTE — HISTORY OF PRESENT ILLNESS
[FreeTextEntry1] : A 75 year old man with recent admission for seizure and in the work up, they noticed something on his upper chest scan. I am not sure what it was and he has no prior history of chest or aortic abnormalities.

## 2020-11-17 NOTE — REASON FOR VISIT
[Consultation] : a consultation visit [Friend] : friend [FreeTextEntry1] : Abnormal CT scan of chest

## 2020-12-02 ENCOUNTER — APPOINTMENT (OUTPATIENT)
Dept: NEUROLOGY | Facility: CLINIC | Age: 75
End: 2020-12-02
Payer: MEDICARE

## 2020-12-02 VITALS
WEIGHT: 185 LBS | HEART RATE: 77 BPM | BODY MASS INDEX: 24.52 KG/M2 | DIASTOLIC BLOOD PRESSURE: 77 MMHG | SYSTOLIC BLOOD PRESSURE: 132 MMHG | HEIGHT: 73 IN

## 2020-12-02 VITALS — TEMPERATURE: 96.9 F

## 2020-12-02 PROCEDURE — 99214 OFFICE O/P EST MOD 30 MIN: CPT

## 2020-12-02 NOTE — ASSESSMENT
[FreeTextEntry1] : Mr. RODRIGUEZ presented recently to [facility] EMU with new onset seizures, found to have seizures on EEG.  Did not tolerate levetiracetam, now getting divalproex and tolerating well without recurrent seizures. Mr. RODRIGUEZ does complain of intermittent pruritis, but not clearly related to divalproex.  Mr. RODRIGUEZ is following up with Dr. Lorenzo of vascular surgery for incidental finding of aortic arch dilatation and abnormalities. \par \par Plan\par 1. continue divalproex at current dose - if breakthrough seizure occurs, will need dose increase to 500 q12.\par 2. measure divalproex levels, CBC, Ch22.\par 3. RTC 3 mo \par \par I have spent 25 minutes of face to face time with the patient reviewing the cause of seizures or seizure-like events, assessing the risk of recurrence, educating the patient or family to recognize seizures, and discussing possible treatment options and possible side effects of seizure medications. I also discussed seizure safety, and ways of reducing seizure risk. Greater than 50% of the encounter time was spent on counseling and coordination of care for reviewing records in [employer], discussion with patient regarding plan.
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
## 2020-12-03 LAB
ALBUMIN SERPL ELPH-MCNC: 4.6 G/DL
ALP BLD-CCNC: 57 U/L
ALT SERPL-CCNC: 12 U/L
ANION GAP SERPL CALC-SCNC: 12 MMOL/L
AST SERPL-CCNC: 18 U/L
BASOPHILS # BLD AUTO: 0.01 K/UL
BASOPHILS NFR BLD AUTO: 0.2 %
BILIRUB SERPL-MCNC: 0.5 MG/DL
BUN SERPL-MCNC: 18 MG/DL
CALCIUM SERPL-MCNC: 9.8 MG/DL
CHLORIDE SERPL-SCNC: 102 MMOL/L
CO2 SERPL-SCNC: 25 MMOL/L
CREAT SERPL-MCNC: 1.04 MG/DL
EOSINOPHIL # BLD AUTO: 0.11 K/UL
EOSINOPHIL NFR BLD AUTO: 2 %
GLUCOSE SERPL-MCNC: 88 MG/DL
HCT VFR BLD CALC: 44.3 %
HGB BLD-MCNC: 13.7 G/DL
IMM GRANULOCYTES NFR BLD AUTO: 0.5 %
LYMPHOCYTES # BLD AUTO: 1.74 K/UL
LYMPHOCYTES NFR BLD AUTO: 31.2 %
MAN DIFF?: NORMAL
MCHC RBC-ENTMCNC: 29.8 PG
MCHC RBC-ENTMCNC: 30.9 GM/DL
MCV RBC AUTO: 96.5 FL
MONOCYTES # BLD AUTO: 0.57 K/UL
MONOCYTES NFR BLD AUTO: 10.2 %
NEUTROPHILS # BLD AUTO: 3.11 K/UL
NEUTROPHILS NFR BLD AUTO: 55.9 %
PLATELET # BLD AUTO: 142 K/UL
POTASSIUM SERPL-SCNC: 4.9 MMOL/L
PROT SERPL-MCNC: 7.1 G/DL
RBC # BLD: 4.59 M/UL
RBC # FLD: 15.1 %
SODIUM SERPL-SCNC: 139 MMOL/L
VALPROATE SERPL-MCNC: 57 UG/ML
WBC # FLD AUTO: 5.57 K/UL

## 2020-12-17 ENCOUNTER — APPOINTMENT (OUTPATIENT)
Dept: CT IMAGING | Facility: IMAGING CENTER | Age: 75
End: 2020-12-17

## 2020-12-19 ENCOUNTER — OUTPATIENT (OUTPATIENT)
Dept: OUTPATIENT SERVICES | Facility: HOSPITAL | Age: 75
LOS: 1 days | End: 2020-12-19
Payer: MEDICARE

## 2020-12-19 ENCOUNTER — APPOINTMENT (OUTPATIENT)
Dept: CT IMAGING | Facility: IMAGING CENTER | Age: 75
End: 2020-12-19
Payer: MEDICARE

## 2020-12-19 ENCOUNTER — RESULT REVIEW (OUTPATIENT)
Age: 75
End: 2020-12-19

## 2020-12-19 DIAGNOSIS — Z00.8 ENCOUNTER FOR OTHER GENERAL EXAMINATION: ICD-10-CM

## 2020-12-19 PROCEDURE — 71275 CT ANGIOGRAPHY CHEST: CPT

## 2020-12-19 PROCEDURE — 71275 CT ANGIOGRAPHY CHEST: CPT | Mod: 26

## 2021-03-03 ENCOUNTER — APPOINTMENT (OUTPATIENT)
Dept: NEUROLOGY | Facility: CLINIC | Age: 76
End: 2021-03-03
Payer: MEDICARE

## 2021-03-03 VITALS
HEART RATE: 74 BPM | SYSTOLIC BLOOD PRESSURE: 127 MMHG | HEIGHT: 73 IN | DIASTOLIC BLOOD PRESSURE: 73 MMHG | BODY MASS INDEX: 24.52 KG/M2 | WEIGHT: 185 LBS

## 2021-03-03 DIAGNOSIS — N52.2 DRUG-INDUCED ERECTILE DYSFUNCTION: ICD-10-CM

## 2021-03-03 PROCEDURE — 99214 OFFICE O/P EST MOD 30 MIN: CPT

## 2021-03-03 RX ORDER — TADALAFIL 10 MG/1
10 TABLET, FILM COATED ORAL
Qty: 30 | Refills: 0 | Status: ACTIVE | COMMUNITY
Start: 2021-03-03 | End: 1900-01-01

## 2021-03-03 RX ORDER — LISINOPRIL 10 MG/1
10 TABLET ORAL DAILY
Refills: 0 | Status: ACTIVE | COMMUNITY

## 2021-03-03 RX ORDER — SIMVASTATIN 20 MG/1
20 TABLET, FILM COATED ORAL
Refills: 0 | Status: ACTIVE | COMMUNITY

## 2021-03-03 NOTE — ASSESSMENT
[FreeTextEntry1] : Mr. RODRIGUEZ presented recently to [facility] EMU with new onset seizures, found to have seizures on EEG.  Did not tolerate levetiracetam, now getting divalproex, level in December 2020 was 57.  I have answered Mr. Rodriguez's questions to the best of my ability, addressing why seizures started, duration of treatment, and side effects of medication.\par \par Plan\par 1. continue divalproex at current dose - if breakthrough seizure occurs, will need dose increase to 500 q12.\par 2.  I recommended tadalafil for erectile dysfunction, and asked Mr. Rodriguez to follow-up with urologist or PCP after initial prescription from the.\par 3.  I will complete paperwork for DMV and resumption of driving at 6-month anniversary.\par 4.  RTC 2 mo \par \par I have spent 30 minutes or longer reviewing patient data or discussing with the patient  the cause of seizures or seizure-like events and comorbid conditions, assessing the risk of recurrence, educating the patient or family to recognize seizures, and discussing possible treatment options for seizures and comorbid conditions and possible side effects of medications. I also discussed seizure safety, and ways of reducing seizure risk. Greater than 50% of the encounter time was spent on counseling and coordination of care for reviewing records in [employer], discussion with patient regarding plan.\par
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
## 2021-09-03 ENCOUNTER — APPOINTMENT (OUTPATIENT)
Dept: NEUROLOGY | Facility: CLINIC | Age: 76
End: 2021-09-03
Payer: MEDICARE

## 2021-09-03 VITALS
DIASTOLIC BLOOD PRESSURE: 67 MMHG | HEART RATE: 65 BPM | WEIGHT: 185 LBS | SYSTOLIC BLOOD PRESSURE: 117 MMHG | BODY MASS INDEX: 24.52 KG/M2 | HEIGHT: 73 IN

## 2021-09-03 PROCEDURE — 99213 OFFICE O/P EST LOW 20 MIN: CPT

## 2021-09-07 NOTE — ASSESSMENT
[FreeTextEntry1] : Mr. RODRIGUEZ presented recently to [facility] EMU with new onset seizures, found to have seizures on EEG.  Did not tolerate levetiracetam, now getting divalproex, level in December 2020 was 57.  I have answered Mr. Rodriguez's questions to the best of my ability, addressing why seizures started, duration of treatment, and side effects of medication.\par \par Plan\par 1. continue divalproex 250 mg BID\par 2. reviewed seizure triggers\par 3.   paperwork for [facility] completed\par 4.  RTC in 3-4 months with Dr Mariano\par \par
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
## 2021-10-25 NOTE — PATIENT PROFILE ADULT - ARRIVAL FROM
Duane Canales from [facility] called our office stating records on this patient were supposed to be sent over so patient can get scheduled for an appointment at [facility]. Please fax records to [phone].  Please call Duane Canales for any questions you may have at [phone]
Received request from Dr. Vernon Mathew office to schedule oncology appointment closer to patient's home. Attempted to call patient X 3. Busy signal all three times. Will try again tomorrow.
Home

## 2022-04-10 ENCOUNTER — EMERGENCY (EMERGENCY)
Facility: HOSPITAL | Age: 77
LOS: 1 days | Discharge: HOME | End: 2022-04-10
Attending: EMERGENCY MEDICINE | Admitting: EMERGENCY MEDICINE
Payer: MEDICARE

## 2022-04-10 VITALS
TEMPERATURE: 98 F | OXYGEN SATURATION: 100 % | HEIGHT: 73 IN | SYSTOLIC BLOOD PRESSURE: 132 MMHG | HEART RATE: 82 BPM | DIASTOLIC BLOOD PRESSURE: 84 MMHG | RESPIRATION RATE: 14 BRPM

## 2022-04-10 LAB
ALBUMIN SERPL ELPH-MCNC: 4.1 G/DL — SIGNIFICANT CHANGE UP (ref 3.3–5)
ALP SERPL-CCNC: 42 U/L — SIGNIFICANT CHANGE UP (ref 40–120)
ALT FLD-CCNC: 9 U/L — SIGNIFICANT CHANGE UP (ref 4–41)
ANION GAP SERPL CALC-SCNC: 16 MMOL/L — HIGH (ref 7–14)
APPEARANCE UR: CLEAR — SIGNIFICANT CHANGE UP
AST SERPL-CCNC: 21 U/L — SIGNIFICANT CHANGE UP (ref 4–40)
BACTERIA # UR AUTO: NEGATIVE — SIGNIFICANT CHANGE UP
BASE EXCESS BLDV CALC-SCNC: 0 MMOL/L — SIGNIFICANT CHANGE UP (ref -2–3)
BASOPHILS # BLD AUTO: 0.02 K/UL — SIGNIFICANT CHANGE UP (ref 0–0.2)
BASOPHILS NFR BLD AUTO: 0.2 % — SIGNIFICANT CHANGE UP (ref 0–2)
BILIRUB SERPL-MCNC: 0.2 MG/DL — SIGNIFICANT CHANGE UP (ref 0.2–1.2)
BILIRUB UR-MCNC: NEGATIVE — SIGNIFICANT CHANGE UP
BLOOD GAS VENOUS COMPREHENSIVE RESULT: SIGNIFICANT CHANGE UP
BUN SERPL-MCNC: 18 MG/DL — SIGNIFICANT CHANGE UP (ref 7–23)
CALCIUM SERPL-MCNC: 9.3 MG/DL — SIGNIFICANT CHANGE UP (ref 8.4–10.5)
CHLORIDE BLDV-SCNC: 105 MMOL/L — SIGNIFICANT CHANGE UP (ref 96–108)
CHLORIDE SERPL-SCNC: 105 MMOL/L — SIGNIFICANT CHANGE UP (ref 98–107)
CK SERPL-CCNC: 137 U/L — SIGNIFICANT CHANGE UP (ref 30–200)
CO2 BLDV-SCNC: 27.9 MMOL/L — HIGH (ref 22–26)
CO2 SERPL-SCNC: 18 MMOL/L — LOW (ref 22–31)
COLOR SPEC: SIGNIFICANT CHANGE UP
CREAT SERPL-MCNC: 0.96 MG/DL — SIGNIFICANT CHANGE UP (ref 0.5–1.3)
DIFF PNL FLD: NEGATIVE — SIGNIFICANT CHANGE UP
EGFR: 82 ML/MIN/1.73M2 — SIGNIFICANT CHANGE UP
EOSINOPHIL # BLD AUTO: 0.04 K/UL — SIGNIFICANT CHANGE UP (ref 0–0.5)
EOSINOPHIL NFR BLD AUTO: 0.5 % — SIGNIFICANT CHANGE UP (ref 0–6)
GAS PNL BLDV: 134 MMOL/L — LOW (ref 136–145)
GLUCOSE BLDV-MCNC: 151 MG/DL — HIGH (ref 70–99)
GLUCOSE SERPL-MCNC: 150 MG/DL — HIGH (ref 70–99)
GLUCOSE UR QL: NEGATIVE — SIGNIFICANT CHANGE UP
HCO3 BLDV-SCNC: 26 MMOL/L — SIGNIFICANT CHANGE UP (ref 22–29)
HCT VFR BLD CALC: 41.6 % — SIGNIFICANT CHANGE UP (ref 39–50)
HCT VFR BLDA CALC: 41 % — SIGNIFICANT CHANGE UP (ref 39–51)
HGB BLD CALC-MCNC: 13.8 G/DL — SIGNIFICANT CHANGE UP (ref 13–17)
HGB BLD-MCNC: 13.5 G/DL — SIGNIFICANT CHANGE UP (ref 13–17)
IANC: 6.51 K/UL — SIGNIFICANT CHANGE UP (ref 1.8–7.4)
IMM GRANULOCYTES NFR BLD AUTO: 0.7 % — SIGNIFICANT CHANGE UP (ref 0–1.5)
KETONES UR-MCNC: NEGATIVE — SIGNIFICANT CHANGE UP
LACTATE BLDV-MCNC: 2.1 MMOL/L — HIGH (ref 0.5–2)
LEUKOCYTE ESTERASE UR-ACNC: NEGATIVE — SIGNIFICANT CHANGE UP
LYMPHOCYTES # BLD AUTO: 1.38 K/UL — SIGNIFICANT CHANGE UP (ref 1–3.3)
LYMPHOCYTES # BLD AUTO: 16 % — SIGNIFICANT CHANGE UP (ref 13–44)
MAGNESIUM SERPL-MCNC: 2 MG/DL — SIGNIFICANT CHANGE UP (ref 1.6–2.6)
MCHC RBC-ENTMCNC: 29.9 PG — SIGNIFICANT CHANGE UP (ref 27–34)
MCHC RBC-ENTMCNC: 32.5 GM/DL — SIGNIFICANT CHANGE UP (ref 32–36)
MCV RBC AUTO: 92 FL — SIGNIFICANT CHANGE UP (ref 80–100)
MONOCYTES # BLD AUTO: 0.64 K/UL — SIGNIFICANT CHANGE UP (ref 0–0.9)
MONOCYTES NFR BLD AUTO: 7.4 % — SIGNIFICANT CHANGE UP (ref 2–14)
NEUTROPHILS # BLD AUTO: 6.51 K/UL — SIGNIFICANT CHANGE UP (ref 1.8–7.4)
NEUTROPHILS NFR BLD AUTO: 75.2 % — SIGNIFICANT CHANGE UP (ref 43–77)
NITRITE UR-MCNC: NEGATIVE — SIGNIFICANT CHANGE UP
NRBC # BLD: 0 /100 WBCS — SIGNIFICANT CHANGE UP
NRBC # FLD: 0 K/UL — SIGNIFICANT CHANGE UP
PCO2 BLDV: 49 MMHG — SIGNIFICANT CHANGE UP (ref 42–55)
PH BLDV: 7.34 — SIGNIFICANT CHANGE UP (ref 7.32–7.43)
PH UR: 6.5 — SIGNIFICANT CHANGE UP (ref 5–8)
PLATELET # BLD AUTO: 129 K/UL — LOW (ref 150–400)
PO2 BLDV: 28 MMHG — SIGNIFICANT CHANGE UP
POTASSIUM BLDV-SCNC: 4.3 MMOL/L — SIGNIFICANT CHANGE UP (ref 3.5–5.1)
POTASSIUM SERPL-MCNC: 4.6 MMOL/L — SIGNIFICANT CHANGE UP (ref 3.5–5.3)
POTASSIUM SERPL-SCNC: 4.6 MMOL/L — SIGNIFICANT CHANGE UP (ref 3.5–5.3)
PROT SERPL-MCNC: 6.7 G/DL — SIGNIFICANT CHANGE UP (ref 6–8.3)
PROT UR-MCNC: ABNORMAL
RBC # BLD: 4.52 M/UL — SIGNIFICANT CHANGE UP (ref 4.2–5.8)
RBC # FLD: 14.9 % — HIGH (ref 10.3–14.5)
RBC CASTS # UR COMP ASSIST: 1 /HPF — SIGNIFICANT CHANGE UP (ref 0–4)
SAO2 % BLDV: 53 % — SIGNIFICANT CHANGE UP
SARS-COV-2 RNA SPEC QL NAA+PROBE: SIGNIFICANT CHANGE UP
SODIUM SERPL-SCNC: 139 MMOL/L — SIGNIFICANT CHANGE UP (ref 135–145)
SP GR SPEC: 1.01 — SIGNIFICANT CHANGE UP (ref 1–1.05)
UROBILINOGEN FLD QL: SIGNIFICANT CHANGE UP
WBC # BLD: 8.65 K/UL — SIGNIFICANT CHANGE UP (ref 3.8–10.5)
WBC # FLD AUTO: 8.65 K/UL — SIGNIFICANT CHANGE UP (ref 3.8–10.5)
WBC UR QL: 1 /HPF — SIGNIFICANT CHANGE UP (ref 0–5)

## 2022-04-10 PROCEDURE — 71045 X-RAY EXAM CHEST 1 VIEW: CPT | Mod: 26

## 2022-04-10 PROCEDURE — 99284 EMERGENCY DEPT VISIT MOD MDM: CPT | Mod: GC

## 2022-04-10 PROCEDURE — 70450 CT HEAD/BRAIN W/O DYE: CPT | Mod: 26,MA

## 2022-04-10 RX ORDER — ACETAMINOPHEN 500 MG
975 TABLET ORAL ONCE
Refills: 0 | Status: COMPLETED | OUTPATIENT
Start: 2022-04-10 | End: 2022-04-10

## 2022-04-10 RX ORDER — LEVETIRACETAM 250 MG/1
1000 TABLET, FILM COATED ORAL ONCE
Refills: 0 | Status: COMPLETED | OUTPATIENT
Start: 2022-04-10 | End: 2022-04-10

## 2022-04-10 RX ADMIN — LEVETIRACETAM 400 MILLIGRAM(S): 250 TABLET, FILM COATED ORAL at 05:44

## 2022-04-10 RX ADMIN — Medication 975 MILLIGRAM(S): at 05:18

## 2022-04-10 NOTE — ED ADULT NURSE REASSESSMENT NOTE - NS ED NURSE REASSESS COMMENT FT1
Break Coverage RN: Pt A&Ox4, ambulatory, No NAD or complaints, respirations are even and unlabored, CM in progress, Safety precautions implemented as per protocol, awaiting further MD orders, will continue to monitor.

## 2022-04-10 NOTE — ED PROVIDER NOTE - CLINICAL SUMMARY MEDICAL DECISION MAKING FREE TEXT BOX
Pt w/ hx of seizures here for seizure. VSS. Exam w/ no deficits. Pt at baseline. Concern for intracranial, metabolic or infectious etiology. Will obtain labs, CXR, CTH, urine, reassess. Keppra load for now.

## 2022-04-10 NOTE — ED ADULT TRIAGE NOTE - CHIEF COMPLAINT QUOTE
presents after having witnessed seizure approximately 2 hours ago. laceration to left side of tongue. +urinary incontinence. No complaints of chest pain, headache, nausea, dizziness, vomiting  SOB, fever, chills verbalized. PMhx HTN seizures.

## 2022-04-10 NOTE — ED ADULT NURSE NOTE - OBJECTIVE STATEMENT
77yo male received in room 11. pt A&Ox4, ambulatory, hx of seizure and HTN, present to the ER after witnessed seizure approximately two hours ago. Patient gf noticed patient shaking in bed with blood in mouth, one episode of urinary incontinence.  Presently endorsed headache. Respiration even and non-labored. in NAD. Received with 18G to left arm, positive blood return no difficulty flushing. NSR on monitor. MD rahman at bedside. Side rails up, bed at lowest position, call bell within reach, patient oriented to the unit, safety maintained.

## 2022-04-10 NOTE — ED PROVIDER NOTE - NSFOLLOWUPINSTRUCTIONS_ED_ALL_ED_FT
You were seen in the Emergency Department for seizure. Your blood work, ekg, xrays, and CT scan did not show any emergent medical problems that require hospitalization or inpatient treatment. Follow up with your primary care doctor and neurologist as discussed.    1) Continue all previously prescribed medications as directed.    2) Follow up with your primary care physician - take copies of your results.    3) Return to the Emergency Department for worsening or persistent symptoms, and/or ANY NEW OR CONCERNING SYMPTOMS.

## 2022-04-10 NOTE — ED PROVIDER NOTE - PHYSICAL EXAMINATION
General: NAD  HEENT: NCAT, PERRL  Cardiac: RRR, 2+ peripheral pulses  Chest: CTA  Abdomen: soft, non-distended, bowel sounds present, no ttp, no rebound or guarding  Extremities: no peripheral edema, calf tenderness, or leg size discrepancies  Skin: no rashes  Neuro: AAOx3, motor and sensory grossly intact  Psych: mood and affect appropriate

## 2022-04-10 NOTE — ED PROVIDER NOTE - ATTENDING CONTRIBUTION TO CARE
77yo M PMHX of HTN, HLD, migraines, seizures on daily keppra and depakote (reports compliance), p/w witnessed seizure by wife while in bed this evening.  Per wife, while she was lying in bed with patient she noted he was breathing abnormally and having body shaking, +urinary incontinence, bit tongue with bleeding, then became confused after event.  Unclear duration of event. Last seizure >1yr ago.  No fevers, head trauma, illnesses.    General: Patient alert in no apparent distress  Skin: Dry and intact  HEENT: Head atraumatic. Oral mucosa moist. +Left lateral tongue lac, not bleeding  Eyes: Conjunctiva normal  Cardiac: Regular rhythm and rate. No pretibial edema b/l  Respiratory: Lungs clear b/l and symmetric. No respiratory distress. Able to speak in complete sentences.  Gastrointestinal: Abdomen soft, nondistended, nontender  Musculoskeletal: Moves all extremities spontaneously  Neurological: alert and oriented to person, place, and time. CN 2-12 grossly intact. No pronator drift. Motor strength 5/5 in all distal extremities.   Psychiatric: Calm and cooperative    EKG nsr with no acute ischemic abnormalities     a/p  seizure in setting of known sz disorder  pt elderly  will get labs, cxr, ua, cth to check for metabolic or infectious abnormalities

## 2022-04-10 NOTE — ED PROVIDER NOTE - PATIENT PORTAL LINK FT
You can access the FollowMyHealth Patient Portal offered by [facility] by registering at the following website: [email or web address]. By joining [employer]’s FollowMyHealth portal, you will also be able to view your health information using other applications (apps) compatible with our system.

## 2022-04-10 NOTE — ED ADULT NURSE NOTE - NSIMPLEMENTINTERV_GEN_ALL_ED
Implemented All Universal Safety Interventions:  [place] to call system. Call bell, personal items and telephone within reach. Instruct patient to call for assistance. Room bathroom lighting operational. Non-slip footwear when patient is off stretcher. Physically safe environment: no spills, clutter or unnecessary equipment. Stretcher in lowest position, wheels locked, appropriate side rails in place.

## 2022-04-10 NOTE — ED PROVIDER NOTE - OBJECTIVE STATEMENT
75yo M hx of HTN, HLD, seizure, comes to ED for seizure. Last evening pt had a migraine headache (feels like one he has had before) but was otherwise in his usual state of health. In bed, wife noticed he was having a gtc, urinated in bed, bit his tongue, was confused afterwards. Pt currently at baseline. Normally takes depakote and keppra. Denies fever, recent illness, cp, sob, cough, n/v, abdominal pain, diarrhea.

## 2022-04-10 NOTE — ED ADULT TRIAGE NOTE - ESI TRIAGE ACUITY LEVEL, MLM
[facility] [place] Cardiac Calcium Scoring CT study:  Spoke with Ms. Felipe today 7- to discuss results of CT Cardiac Calcium study as reviewed by Dr. Rangel.    Findings:   Calcium score: 0    Please see complete report in Imaging.      IMPRESSION:   No coronary artery calcium identified, no indication for medical therapy. Consider repeat testing in 5 years.     Encouraged to follow up with Primary Care Provider    Encouraged to contact study team with any questions.   2

## 2022-04-10 NOTE — ED PROVIDER NOTE - PROGRESS NOTE DETAILS
MARLEN Cohen (PGY-2) - pt w/o metabolic, infectious, or intracranial pathology on work up. Will dc w/ neurology follow up,.

## 2022-04-13 ENCOUNTER — APPOINTMENT (OUTPATIENT)
Dept: NEUROLOGY | Facility: CLINIC | Age: 77
End: 2022-04-13

## 2022-04-18 ENCOUNTER — APPOINTMENT (OUTPATIENT)
Dept: NEUROLOGY | Facility: CLINIC | Age: 77
End: 2022-04-18
Payer: MEDICARE

## 2022-04-18 VITALS
HEIGHT: 73 IN | DIASTOLIC BLOOD PRESSURE: 68 MMHG | BODY MASS INDEX: 23.86 KG/M2 | WEIGHT: 180 LBS | HEART RATE: 69 BPM | SYSTOLIC BLOOD PRESSURE: 113 MMHG

## 2022-04-18 PROCEDURE — 99213 OFFICE O/P EST LOW 20 MIN: CPT

## 2022-04-27 NOTE — ASSESSMENT
[FreeTextEntry1] : Mr. RODRIGUEZ presented recently to [facility] EMU with new onset seizures, found to have seizures on EEG.  Did not tolerate levetiracetam, now getting divalproex, level in December 2020 was 57.  I have answered Mr. Rodriguez's questions to the best of my ability, addressing why seizures started, duration of treatment, and side effects of medication.\par \par Plan\par 1. continue  current AED regimeni\par 2. reviewed seizure triggers\par 3.  proscribed [place] law\par 4.  RTC in 3 months with Dr Mariano\par \par
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
## 2022-04-29 ENCOUNTER — APPOINTMENT (OUTPATIENT)
Dept: NEUROLOGY | Facility: CLINIC | Age: 77
End: 2022-04-29

## 2022-07-29 ENCOUNTER — APPOINTMENT (OUTPATIENT)
Dept: NEUROLOGY | Facility: CLINIC | Age: 77
End: 2022-07-29

## 2022-07-29 VITALS
DIASTOLIC BLOOD PRESSURE: 68 MMHG | BODY MASS INDEX: 23.86 KG/M2 | SYSTOLIC BLOOD PRESSURE: 117 MMHG | HEIGHT: 73 IN | WEIGHT: 180 LBS | HEART RATE: 62 BPM

## 2022-07-29 PROCEDURE — 99214 OFFICE O/P EST MOD 30 MIN: CPT

## 2022-08-16 NOTE — DISCUSSION/SUMMARY
[FreeTextEntry1] : Mr. RODRIGUEZ presented recently to [facility] EMU with new onset seizures, found to have seizures on EEG. Did not tolerate levetiracetam, was on divalproex 250 mg BID increased to 500 mg BID after his last breakthrough seizure on 4/2022, no longer taking Keppra that which was prescribed by ED. \par \par Plan\par 1. Continue with Depakote  mg 2 tabs BID  \par 2. Reviewed seizure triggers\par 3. No driving [place] law, will re-evaluate 10/2022\par 4. RTC in 6 months with Dr Mariano\par 5. DMV annual reporting form completed and sent in. \par \par I have spent 30 minutes or longer reviewing patient data or discussing with the patient  the cause of seizures or seizure-like events and comorbid conditions, assessing the risk of recurrence, educating the patient or family to recognize seizures, discussing possible treatment options for seizures and comorbid conditions and possible side effects of medications, and documenting encounter and plan. I also discussed seizure safety, and ways of reducing seizure risk. Greater than 50% of the encounter time was spent on counseling and coordination of care for reviewing records in [employer], discussion with patient regarding plan. \par .

## 2022-08-16 NOTE — END OF VISIT
[FreeTextEntry3] : Mr. MICHAEL RODRIGUEZ was seen and examined with Epilepsy fellow, Dr. Geovanna Ferrara.  I reviewed history and plan with Mr. RODRIGUEZ and edited the note.
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
## 2022-08-23 NOTE — ED ADULT NURSE NOTE - PRO INTERPRETER NEED 2
E-scribe request for med refill. Please review and e-scribe if applicable. Last Visit Date:  05/16/2022  Next Visit Date:  Visit date not found    Hemoglobin A1C (%)   Date Value   09/16/2019 5.5   12/21/2016 6.1 (H)   05/16/2014 5.6             ( goal A1C is < 7)   Microalb/Crt.  Ratio (mcg/mg creat)   Date Value   10/26/2011 11     LDL Cholesterol (mg/dL)   Date Value   09/16/2019 45       (goal LDL is <100)   AST (U/L)   Date Value   06/25/2020 17     ALT (U/L)   Date Value   06/25/2020 15     BUN (mg/dL)   Date Value   03/13/2022 15     BP Readings from Last 3 Encounters:   05/16/22 132/87   03/14/22 (!) 160/101   11/22/21 (!) 148/94          (goal 120/80)        Patient Active Problem List:     Major depressive disorder, recurrent episode, severe (HCC)     Bilateral lower abdominal pain     Irritable bowel syndrome with constipation and diarrhea     Uterine leiomyoma     Mirena IUD (1/24/17)     Abnormal uterine bleeding (AUB)     Small bowel obstruction (HCC)     Essential hypertension     Seasonal allergies     Sleep difficulties     Bilateral carpal tunnel syndrome     Bipolar 1 disorder (HCC)     Atypical chest pain     Trigger finger of left thumb     Carpal tunnel syndrome of right wrist      ----Audra Olmsteadim
English

## 2022-09-02 ENCOUNTER — APPOINTMENT (OUTPATIENT)
Dept: PAIN MANAGEMENT | Facility: CLINIC | Age: 77
End: 2022-09-02

## 2022-09-02 VITALS
WEIGHT: 187 LBS | BODY MASS INDEX: 24.78 KG/M2 | SYSTOLIC BLOOD PRESSURE: 133 MMHG | HEART RATE: 60 BPM | HEIGHT: 73 IN | DIASTOLIC BLOOD PRESSURE: 64 MMHG

## 2022-09-02 DIAGNOSIS — G44.039 EPISODIC PAROXYSMAL HEMICRANIA, NOT INTRACTABLE: ICD-10-CM

## 2022-09-02 PROCEDURE — 99204 OFFICE O/P NEW MOD 45 MIN: CPT

## 2022-09-13 PROBLEM — G44.039 PAROXYSMAL HEMICRANIA: Status: ACTIVE | Noted: 2022-09-13

## 2022-09-13 NOTE — HISTORY OF PRESENT ILLNESS
[FreeTextEntry1] : Pt is 78 yo man who has had episodes of bilateral frontal pain that then travels to top of head.  Notes that he worked for 35 years for [employer] and didn't notice this at that point but worsened after leaving job..\par Will last 10-20 min and is "worse than a hangover."  no p/p phobia and no nausea.\par THis has been for about 2 years and staying the same.\par Doesn't occur when sleeping, driving, or when watching tv.\par Tends to be localized, short and stabbing and often improved before he could reach for medicaiton.\par During events they can be disabling and severe but rapidly improve without residual symptoms or focal neurological deficits.\par \par  [Chronic Headache] : chronic headache [Aura] : no aura [Dizziness] : no dizziness [Nausea] : no nausea [Photophobia] : photophobia [Scalp Tenderness] : scalp tenderness [> 4 hours] : > 4 hours [stayed the same] : stayed the same [Stable] : The patient reports ~his/her~ symptoms since the last visit are stable [de-identified] : variable

## 2022-09-13 NOTE — PHYSICAL EXAM
[General Appearance - Alert] : alert [General Appearance - Well Nourished] : well nourished [General Appearance - Well Developed] : well developed [General Appearance - Well-Appearing] : healthy appearing [Oriented To Time, Place, And Person] : oriented to person, place, and time [Affect] : the affect was normal [Mood] : the mood was normal [Memory Recent] : recent memory was not impaired [Memory Remote] : remote memory was not impaired [Person] : oriented to person [Place] : oriented to place [Time] : oriented to time [Short Term Intact] : short term memory intact [Remote Intact] : remote memory intact [Registration Intact] : recent registration memory intact [Visual Intact] : visual attention was ~T not ~L decreased [Naming Objects] : no difficulty naming common objects [Fluency] : fluency intact [Comprehension] : comprehension intact [Current Events] : adequate knowledge of current events [Past History] : adequate knowledge of personal past history [Vocabulary] : adequate range of vocabulary [Cranial Nerves Oculomotor (III)] : extraocular motion intact [Cranial Nerves Facial (VII)] : face symmetrical [Cranial Nerves Vestibulocochlear (VIII)] : hearing was intact bilaterally [Cranial Nerves Accessory (XI - Cranial And Spinal)] : head turning and shoulder shrug symmetric [Cranial Nerves Hypoglossal (XII)] : there was no tongue deviation with protrusion [No Muscle Atrophy] : normal bulk in all four extremities [Motor Strength Upper Extremities Bilaterally] : strength was normal in both upper extremities [Abnormal Walk] : normal gait [Dysdiadochokinesia Bilaterally] : not present [FreeTextEntry8] : mild rigidity [Neck Appearance] : the appearance of the neck was normal [Neck Cervical Mass (___cm)] : no neck mass was observed [Exaggerated Use Of Accessory Muscles For Inspiration] : no accessory muscle use [Nail Clubbing] : no clubbing  or cyanosis of the fingernails [Involuntary Movements] : no involuntary movements were seen [Skin Color & Pigmentation] : normal skin color and pigmentation [] : no rash

## 2022-09-13 NOTE — ASSESSMENT
[FreeTextEntry1] : New Daily Persistent Headache.\par   r/o paroxysmal hemicrania\par r/o cervicogenic headache\par consider for imaging of c-spine\par trial of melatonin or coq-10.\par consider indocin or boswelllia trial preventively\par gentle neck excercises and therapy

## 2022-10-03 ENCOUNTER — INPATIENT (INPATIENT)
Facility: HOSPITAL | Age: 77
LOS: 4 days | Discharge: HOME | End: 2022-10-08
Attending: STUDENT IN AN ORGANIZED HEALTH CARE EDUCATION/TRAINING PROGRAM | Admitting: STUDENT IN AN ORGANIZED HEALTH CARE EDUCATION/TRAINING PROGRAM

## 2022-10-03 VITALS
HEIGHT: 73 IN | TEMPERATURE: 103 F | SYSTOLIC BLOOD PRESSURE: 118 MMHG | HEART RATE: 98 BPM | DIASTOLIC BLOOD PRESSURE: 75 MMHG | RESPIRATION RATE: 17 BRPM | OXYGEN SATURATION: 95 %

## 2022-10-03 LAB
ALBUMIN SERPL ELPH-MCNC: 4.1 G/DL — SIGNIFICANT CHANGE UP (ref 3.3–5)
ALP SERPL-CCNC: 28 U/L — LOW (ref 40–120)
ALT FLD-CCNC: 24 U/L — SIGNIFICANT CHANGE UP (ref 4–41)
ANION GAP SERPL CALC-SCNC: 14 MMOL/L — SIGNIFICANT CHANGE UP (ref 7–14)
APPEARANCE UR: CLEAR — SIGNIFICANT CHANGE UP
APTT BLD: 24.8 SEC — LOW (ref 27–36.3)
AST SERPL-CCNC: 47 U/L — HIGH (ref 4–40)
BACTERIA # UR AUTO: NEGATIVE — SIGNIFICANT CHANGE UP
BASE EXCESS BLDV CALC-SCNC: -1.3 MMOL/L — SIGNIFICANT CHANGE UP (ref -2–3)
BASOPHILS # BLD AUTO: 0.07 K/UL — SIGNIFICANT CHANGE UP (ref 0–0.2)
BASOPHILS NFR BLD AUTO: 0.9 % — SIGNIFICANT CHANGE UP (ref 0–2)
BILIRUB SERPL-MCNC: 0.4 MG/DL — SIGNIFICANT CHANGE UP (ref 0.2–1.2)
BILIRUB UR-MCNC: NEGATIVE — SIGNIFICANT CHANGE UP
BLOOD GAS VENOUS COMPREHENSIVE RESULT: SIGNIFICANT CHANGE UP
BUN SERPL-MCNC: 21 MG/DL — SIGNIFICANT CHANGE UP (ref 7–23)
BURR CELLS BLD QL SMEAR: SIGNIFICANT CHANGE UP
CALCIUM SERPL-MCNC: 9.1 MG/DL — SIGNIFICANT CHANGE UP (ref 8.4–10.5)
CHLORIDE BLDV-SCNC: 98 MMOL/L — SIGNIFICANT CHANGE UP (ref 96–108)
CHLORIDE SERPL-SCNC: 98 MMOL/L — SIGNIFICANT CHANGE UP (ref 98–107)
CO2 BLDV-SCNC: 22 MMOL/L — SIGNIFICANT CHANGE UP (ref 22–26)
CO2 SERPL-SCNC: 20 MMOL/L — LOW (ref 22–31)
COLOR SPEC: YELLOW — SIGNIFICANT CHANGE UP
CREAT SERPL-MCNC: 1.2 MG/DL — SIGNIFICANT CHANGE UP (ref 0.5–1.3)
DIFF PNL FLD: NEGATIVE — SIGNIFICANT CHANGE UP
EGFR: 63 ML/MIN/1.73M2 — SIGNIFICANT CHANGE UP
EOSINOPHIL # BLD AUTO: 0 K/UL — SIGNIFICANT CHANGE UP (ref 0–0.5)
EOSINOPHIL NFR BLD AUTO: 0 % — SIGNIFICANT CHANGE UP (ref 0–6)
EPI CELLS # UR: 1 /HPF — SIGNIFICANT CHANGE UP (ref 0–5)
GAS PNL BLDV: 126 MMOL/L — LOW (ref 136–145)
GLUCOSE BLDV-MCNC: 133 MG/DL — HIGH (ref 70–99)
GLUCOSE SERPL-MCNC: 128 MG/DL — HIGH (ref 70–99)
GLUCOSE UR QL: NEGATIVE — SIGNIFICANT CHANGE UP
HCO3 BLDV-SCNC: 21 MMOL/L — LOW (ref 22–29)
HCT VFR BLD CALC: 44.5 % — SIGNIFICANT CHANGE UP (ref 39–50)
HCT VFR BLDA CALC: 44 % — SIGNIFICANT CHANGE UP (ref 39–51)
HGB BLD CALC-MCNC: 14.7 G/DL — SIGNIFICANT CHANGE UP (ref 13–17)
HGB BLD-MCNC: 14.4 G/DL — SIGNIFICANT CHANGE UP (ref 13–17)
HYALINE CASTS # UR AUTO: 0 /LPF — SIGNIFICANT CHANGE UP (ref 0–7)
IANC: 5.36 K/UL — SIGNIFICANT CHANGE UP (ref 1.8–7.4)
INR BLD: 1.2 RATIO — HIGH (ref 0.88–1.16)
KETONES UR-MCNC: ABNORMAL
LACTATE BLDV-MCNC: 1.5 MMOL/L — SIGNIFICANT CHANGE UP (ref 0.5–2)
LEUKOCYTE ESTERASE UR-ACNC: NEGATIVE — SIGNIFICANT CHANGE UP
LYMPHOCYTES # BLD AUTO: 0.52 K/UL — LOW (ref 1–3.3)
LYMPHOCYTES # BLD AUTO: 6.9 % — LOW (ref 13–44)
MCHC RBC-ENTMCNC: 29.3 PG — SIGNIFICANT CHANGE UP (ref 27–34)
MCHC RBC-ENTMCNC: 32.4 GM/DL — SIGNIFICANT CHANGE UP (ref 32–36)
MCV RBC AUTO: 90.6 FL — SIGNIFICANT CHANGE UP (ref 80–100)
MONOCYTES # BLD AUTO: 0.65 K/UL — SIGNIFICANT CHANGE UP (ref 0–0.9)
MONOCYTES NFR BLD AUTO: 8.7 % — SIGNIFICANT CHANGE UP (ref 2–14)
NEUTROPHILS # BLD AUTO: 6.01 K/UL — SIGNIFICANT CHANGE UP (ref 1.8–7.4)
NEUTROPHILS NFR BLD AUTO: 73 % — SIGNIFICANT CHANGE UP (ref 43–77)
NEUTS BAND # BLD: 7 % — HIGH (ref 0–6)
NITRITE UR-MCNC: NEGATIVE — SIGNIFICANT CHANGE UP
NT-PROBNP SERPL-SCNC: 134 PG/ML — SIGNIFICANT CHANGE UP
PCO2 BLDV: 29 MMHG — LOW (ref 42–55)
PH BLDV: 7.47 — HIGH (ref 7.32–7.43)
PH UR: 6 — SIGNIFICANT CHANGE UP (ref 5–8)
PLAT MORPH BLD: NORMAL — SIGNIFICANT CHANGE UP
PLATELET # BLD AUTO: 50 K/UL — LOW (ref 150–400)
PLATELET COUNT - ESTIMATE: ABNORMAL
PO2 BLDV: 45 MMHG — SIGNIFICANT CHANGE UP
POIKILOCYTOSIS BLD QL AUTO: SLIGHT — SIGNIFICANT CHANGE UP
POTASSIUM BLDV-SCNC: 4.4 MMOL/L — SIGNIFICANT CHANGE UP (ref 3.5–5.1)
POTASSIUM SERPL-MCNC: 4.5 MMOL/L — SIGNIFICANT CHANGE UP (ref 3.5–5.3)
POTASSIUM SERPL-SCNC: 4.5 MMOL/L — SIGNIFICANT CHANGE UP (ref 3.5–5.3)
PROT SERPL-MCNC: 7.3 G/DL — SIGNIFICANT CHANGE UP (ref 6–8.3)
PROT UR-MCNC: ABNORMAL
PROTHROM AB SERPL-ACNC: 14 SEC — HIGH (ref 10.5–13.4)
RBC # BLD: 4.91 M/UL — SIGNIFICANT CHANGE UP (ref 4.2–5.8)
RBC # FLD: 14.3 % — SIGNIFICANT CHANGE UP (ref 10.3–14.5)
RBC BLD AUTO: ABNORMAL
RBC CASTS # UR COMP ASSIST: 2 /HPF — SIGNIFICANT CHANGE UP (ref 0–4)
SAO2 % BLDV: 75.7 % — SIGNIFICANT CHANGE UP
SODIUM SERPL-SCNC: 132 MMOL/L — LOW (ref 135–145)
SP GR SPEC: 1.03 — SIGNIFICANT CHANGE UP (ref 1.01–1.05)
TROPONIN T, HIGH SENSITIVITY RESULT: 16 NG/L — SIGNIFICANT CHANGE UP
UROBILINOGEN FLD QL: SIGNIFICANT CHANGE UP
VARIANT LYMPHS # BLD: 3.5 % — SIGNIFICANT CHANGE UP (ref 0–6)
WBC # BLD: 7.51 K/UL — SIGNIFICANT CHANGE UP (ref 3.8–10.5)
WBC # FLD AUTO: 7.51 K/UL — SIGNIFICANT CHANGE UP (ref 3.8–10.5)
WBC UR QL: 2 /HPF — SIGNIFICANT CHANGE UP (ref 0–5)

## 2022-10-03 PROCEDURE — 99285 EMERGENCY DEPT VISIT HI MDM: CPT

## 2022-10-03 PROCEDURE — 71045 X-RAY EXAM CHEST 1 VIEW: CPT | Mod: 26

## 2022-10-03 RX ORDER — PIPERACILLIN AND TAZOBACTAM 4; .5 G/20ML; G/20ML
3.38 INJECTION, POWDER, LYOPHILIZED, FOR SOLUTION INTRAVENOUS ONCE
Refills: 0 | Status: COMPLETED | OUTPATIENT
Start: 2022-10-03 | End: 2022-10-03

## 2022-10-03 RX ORDER — VANCOMYCIN HCL 1 G
1000 VIAL (EA) INTRAVENOUS ONCE
Refills: 0 | Status: COMPLETED | OUTPATIENT
Start: 2022-10-03 | End: 2022-10-03

## 2022-10-03 RX ORDER — ACETAMINOPHEN 500 MG
975 TABLET ORAL ONCE
Refills: 0 | Status: COMPLETED | OUTPATIENT
Start: 2022-10-03 | End: 2022-10-03

## 2022-10-03 RX ADMIN — Medication 975 MILLIGRAM(S): at 21:52

## 2022-10-03 RX ADMIN — Medication 100 MILLIGRAM(S): at 21:53

## 2022-10-03 NOTE — ED PROVIDER NOTE - CLINICAL SUMMARY MEDICAL DECISION MAKING FREE TEXT BOX
77-year-old male with past medical history of HTN, HLD presents to the ED with productive cough and generalized weakness.  Patient endorses falling at home secondary to generalized weakness.  No back pain, saddle anesthesia, fecal or urinary incontinence.  Initial vitals notable for fever, Tylenol given.  Rest of the vitals were nonactionable.  Physical exam as noted above.  Breathing comfortably at bedside.  Productive tussive episodes noted.  5 out of 5 strength upper and lower extremities.  Unlikely cord based pathology at this time.  Differential diagnosis includes but not limited to PNA versus viral syndrome versus metabolic derangements.  Will order labs, EKG, meds, imaging and reassess. 77-year-old male with past medical history of HTN, HLD presents to the ED with productive cough and generalized weakness.  Patient endorses falling at home secondary to generalized weakness.  No back pain, saddle anesthesia, fecal or urinary incontinence.  Initial vitals notable for fever, Tylenol given.  Rest of the vitals were nonactionable.  Physical exam as noted above.  Breathing comfortably at bedside.  Productive tussive episodes noted.  5 out of 5 strength upper and lower extremities.  Unlikely cord based pathology at this time.  Differential diagnosis includes but not limited to PNA versus viral syndrome versus metabolic derangements.  Will order labs, EKG, meds, imaging and reassess.    molly: pt at home found on the floor at his home, noted here to have fever and cough.  pt c/o generalized weakness.  pt clearly febrile, but able to appropriately andswer questions.  denies any dysuria/ urgency.  denies prostate issues.  prostate exam is without tenderness.  abd is soft no r/g, perineum without redness or crepitance.

## 2022-10-03 NOTE — ED ADULT NURSE NOTE - OBJECTIVE STATEMENT
Received patient with c/o coughing, SOB and shaking for about 2 days. Patient denies pain and is in no acute distress, labs drawn and sent, awaits further evaluation

## 2022-10-03 NOTE — ED PROVIDER NOTE - PHYSICAL EXAMINATION
GENERAL: no acute distress, non-toxic appearing  HEAD: normocephalic, atraumatic  HEENT: normal conjunctiva, oral mucosa moist, neck supple  CARDIAC: regular rate and rhythm  PULM: productive tussive episodes noted, breathing comfortably  GI: abdomen nondistended, soft, nontender, no guarding or rebound tenderness  : no CVA tenderness, no suprapubic tenderness    NEURO: alert and oriented x 3, normal speech, no focal motor or sensory deficits, moving upper and lower extremities spontaneously    MSK: no visible deformities, no peripheral edema, calf tenderness/redness/swelling  SKIN: no visible rashes, dry, well-perfused  PSYCH: appropriate mood and affect

## 2022-10-03 NOTE — ED PROVIDER NOTE - ATTENDING CONTRIBUTION TO CARE
molly: pt at home found on the floor at his home, noted here to have fever and cough.  pt c/o generalized weakness.  pt clearly febrile, but able to appropriately andswer questions.  denies any dysuria/ urgency.  denies prostate issues.  prostate exam is without tenderness.  abd is soft no r/g, perineum without redness or crepitance.     I performed a history and physical exam of the patient and discussed their management with the resident and /or advanced care provider. I reviewed the resident and /or ACP's note and agree with the documented findings and plan of care. My medical decison making and observations are found above.

## 2022-10-03 NOTE — ED PROVIDER NOTE - OBJECTIVE STATEMENT
77-year-old male with a past medical history of HTN, presents to the ED with productive cough and generalized weakness.  Patient endorses productive cough over the last 2 days progressively worsened.  No exacerbating or alleviating factors.  Noted to be feverish and chills at home.  No shortness of breath, chest pain, nausea, vomiting, diarrhea.  As per girlfriend at bedside, patient fell earlier today secondary to generalized weakness.  He endorses head trauma, no LOC, no AC use.  Patient denies any back pain, saddle anesthesia, fecal or urinary incontinence.  He denies any other symptoms at bedside. No other sick contacts at home.

## 2022-10-03 NOTE — ED ADULT NURSE NOTE - NSIMPLEMENTINTERV_GEN_ALL_ED
Implemented All Fall Risk Interventions:  [place] to call system. Call bell, personal items and telephone within reach. Instruct patient to call for assistance. Room bathroom lighting operational. Non-slip footwear when patient is off stretcher. Physically safe environment: no spills, clutter or unnecessary equipment. Stretcher in lowest position, wheels locked, appropriate side rails in place. Provide visual cue, wrist band, yellow gown, etc. Monitor gait and stability. Monitor for mental status changes and reorient to person, place, and time. Review medications for side effects contributing to fall risk. Reinforce activity limits and safety measures with patient and family.

## 2022-10-04 DIAGNOSIS — E87.1 HYPO-OSMOLALITY AND HYPONATREMIA: ICD-10-CM
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
## 2022-10-04 DIAGNOSIS — Z29.9 ENCOUNTER FOR PROPHYLACTIC MEASURES, UNSPECIFIED: ICD-10-CM

## 2022-10-04 DIAGNOSIS — E78.5 HYPERLIPIDEMIA, UNSPECIFIED: ICD-10-CM

## 2022-10-04 DIAGNOSIS — R74.01 ELEVATION OF LEVELS OF LIVER TRANSAMINASE LEVELS: ICD-10-CM

## 2022-10-04 DIAGNOSIS — D69.6 THROMBOCYTOPENIA, UNSPECIFIED: ICD-10-CM

## 2022-10-04 DIAGNOSIS — J12.3 HUMAN METAPNEUMOVIRUS PNEUMONIA: ICD-10-CM

## 2022-10-04 DIAGNOSIS — Z98.890 OTHER SPECIFIED POSTPROCEDURAL STATES: Chronic | ICD-10-CM

## 2022-10-04 DIAGNOSIS — N17.9 ACUTE KIDNEY FAILURE, UNSPECIFIED: ICD-10-CM

## 2022-10-04 DIAGNOSIS — M62.82 RHABDOMYOLYSIS: ICD-10-CM

## 2022-10-04 DIAGNOSIS — R50.9 FEVER, UNSPECIFIED: ICD-10-CM

## 2022-10-04 DIAGNOSIS — I10 ESSENTIAL (PRIMARY) HYPERTENSION: ICD-10-CM

## 2022-10-04 LAB
ANION GAP SERPL CALC-SCNC: 12 MMOL/L — SIGNIFICANT CHANGE UP (ref 7–14)
B PERT DNA SPEC QL NAA+PROBE: SIGNIFICANT CHANGE UP
B PERT+PARAPERT DNA PNL SPEC NAA+PROBE: SIGNIFICANT CHANGE UP
BORDETELLA PARAPERTUSSIS (RAPRVP): SIGNIFICANT CHANGE UP
BUN SERPL-MCNC: 22 MG/DL — SIGNIFICANT CHANGE UP (ref 7–23)
C PNEUM DNA SPEC QL NAA+PROBE: SIGNIFICANT CHANGE UP
CALCIUM SERPL-MCNC: 8.8 MG/DL — SIGNIFICANT CHANGE UP (ref 8.4–10.5)
CHLORIDE SERPL-SCNC: 97 MMOL/L — LOW (ref 98–107)
CK SERPL-CCNC: 585 U/L — HIGH (ref 30–200)
CO2 SERPL-SCNC: 21 MMOL/L — LOW (ref 22–31)
CREAT SERPL-MCNC: 1.14 MG/DL — SIGNIFICANT CHANGE UP (ref 0.5–1.3)
EGFR: 67 ML/MIN/1.73M2 — SIGNIFICANT CHANGE UP
FLUAV SUBTYP SPEC NAA+PROBE: SIGNIFICANT CHANGE UP
FLUBV RNA SPEC QL NAA+PROBE: SIGNIFICANT CHANGE UP
GLUCOSE SERPL-MCNC: 120 MG/DL — HIGH (ref 70–99)
HADV DNA SPEC QL NAA+PROBE: SIGNIFICANT CHANGE UP
HCOV 229E RNA SPEC QL NAA+PROBE: SIGNIFICANT CHANGE UP
HCOV HKU1 RNA SPEC QL NAA+PROBE: SIGNIFICANT CHANGE UP
HCOV NL63 RNA SPEC QL NAA+PROBE: SIGNIFICANT CHANGE UP
HCOV OC43 RNA SPEC QL NAA+PROBE: SIGNIFICANT CHANGE UP
HCT VFR BLD CALC: 41.8 % — SIGNIFICANT CHANGE UP (ref 39–50)
HGB BLD-MCNC: 13.7 G/DL — SIGNIFICANT CHANGE UP (ref 13–17)
HMPV RNA SPEC QL NAA+PROBE: DETECTED
HPIV1 RNA SPEC QL NAA+PROBE: SIGNIFICANT CHANGE UP
HPIV2 RNA SPEC QL NAA+PROBE: SIGNIFICANT CHANGE UP
HPIV3 RNA SPEC QL NAA+PROBE: SIGNIFICANT CHANGE UP
HPIV4 RNA SPEC QL NAA+PROBE: SIGNIFICANT CHANGE UP
M PNEUMO DNA SPEC QL NAA+PROBE: SIGNIFICANT CHANGE UP
MAGNESIUM SERPL-MCNC: 2 MG/DL — SIGNIFICANT CHANGE UP (ref 1.6–2.6)
MCHC RBC-ENTMCNC: 29.8 PG — SIGNIFICANT CHANGE UP (ref 27–34)
MCHC RBC-ENTMCNC: 32.8 GM/DL — SIGNIFICANT CHANGE UP (ref 32–36)
MCV RBC AUTO: 91.1 FL — SIGNIFICANT CHANGE UP (ref 80–100)
NRBC # BLD: 0 /100 WBCS — SIGNIFICANT CHANGE UP (ref 0–0)
NRBC # FLD: 0 K/UL — SIGNIFICANT CHANGE UP (ref 0–0)
PHOSPHATE SERPL-MCNC: 3.3 MG/DL — SIGNIFICANT CHANGE UP (ref 2.5–4.5)
PLATELET # BLD AUTO: 50 K/UL — LOW (ref 150–400)
POTASSIUM SERPL-MCNC: 4.4 MMOL/L — SIGNIFICANT CHANGE UP (ref 3.5–5.3)
POTASSIUM SERPL-SCNC: 4.4 MMOL/L — SIGNIFICANT CHANGE UP (ref 3.5–5.3)
RAPID RVP RESULT: DETECTED
RBC # BLD: 4.59 M/UL — SIGNIFICANT CHANGE UP (ref 4.2–5.8)
RBC # FLD: 14.2 % — SIGNIFICANT CHANGE UP (ref 10.3–14.5)
RSV RNA SPEC QL NAA+PROBE: SIGNIFICANT CHANGE UP
RV+EV RNA SPEC QL NAA+PROBE: SIGNIFICANT CHANGE UP
SARS-COV-2 RNA SPEC QL NAA+PROBE: SIGNIFICANT CHANGE UP
SODIUM SERPL-SCNC: 130 MMOL/L — LOW (ref 135–145)
WBC # BLD: 8.62 K/UL — SIGNIFICANT CHANGE UP (ref 3.8–10.5)
WBC # FLD AUTO: 8.62 K/UL — SIGNIFICANT CHANGE UP (ref 3.8–10.5)

## 2022-10-04 PROCEDURE — 72125 CT NECK SPINE W/O DYE: CPT | Mod: 26,MA

## 2022-10-04 PROCEDURE — 70450 CT HEAD/BRAIN W/O DYE: CPT | Mod: 26,MA

## 2022-10-04 PROCEDURE — 71250 CT THORAX DX C-: CPT | Mod: 26

## 2022-10-04 PROCEDURE — 99223 1ST HOSP IP/OBS HIGH 75: CPT

## 2022-10-04 RX ORDER — CEFTRIAXONE 500 MG/1
1000 INJECTION, POWDER, FOR SOLUTION INTRAMUSCULAR; INTRAVENOUS EVERY 24 HOURS
Refills: 0 | Status: DISCONTINUED | OUTPATIENT
Start: 2022-10-04 | End: 2022-10-08

## 2022-10-04 RX ORDER — SIMVASTATIN 20 MG/1
1 TABLET, FILM COATED ORAL
Qty: 0 | Refills: 0 | DISCHARGE

## 2022-10-04 RX ORDER — SIMVASTATIN 20 MG/1
20 TABLET, FILM COATED ORAL AT BEDTIME
Refills: 0 | Status: DISCONTINUED | OUTPATIENT
Start: 2022-10-04 | End: 2022-10-08

## 2022-10-04 RX ORDER — SODIUM CHLORIDE 9 MG/ML
4 INJECTION INTRAMUSCULAR; INTRAVENOUS; SUBCUTANEOUS ONCE
Refills: 0 | Status: DISCONTINUED | OUTPATIENT
Start: 2022-10-04 | End: 2022-10-06

## 2022-10-04 RX ORDER — LISINOPRIL 2.5 MG/1
1 TABLET ORAL
Qty: 0 | Refills: 0 | DISCHARGE

## 2022-10-04 RX ORDER — ACETAMINOPHEN 500 MG
650 TABLET ORAL EVERY 6 HOURS
Refills: 0 | Status: DISCONTINUED | OUTPATIENT
Start: 2022-10-04 | End: 2022-10-08

## 2022-10-04 RX ORDER — LISINOPRIL 2.5 MG/1
10 TABLET ORAL DAILY
Refills: 0 | Status: DISCONTINUED | OUTPATIENT
Start: 2022-10-04 | End: 2022-10-08

## 2022-10-04 RX ORDER — LANOLIN ALCOHOL/MO/W.PET/CERES
3 CREAM (GRAM) TOPICAL AT BEDTIME
Refills: 0 | Status: DISCONTINUED | OUTPATIENT
Start: 2022-10-04 | End: 2022-10-08

## 2022-10-04 RX ORDER — DIVALPROEX SODIUM 500 MG/1
500 TABLET, DELAYED RELEASE ORAL
Refills: 0 | Status: DISCONTINUED | OUTPATIENT
Start: 2022-10-04 | End: 2022-10-08

## 2022-10-04 RX ORDER — AZITHROMYCIN 500 MG/1
TABLET, FILM COATED ORAL
Refills: 0 | Status: DISCONTINUED | OUTPATIENT
Start: 2022-10-04 | End: 2022-10-07

## 2022-10-04 RX ORDER — AZITHROMYCIN 500 MG/1
500 TABLET, FILM COATED ORAL ONCE
Refills: 0 | Status: COMPLETED | OUTPATIENT
Start: 2022-10-04 | End: 2022-10-04

## 2022-10-04 RX ORDER — AZITHROMYCIN 500 MG/1
500 TABLET, FILM COATED ORAL EVERY 24 HOURS
Refills: 0 | Status: DISCONTINUED | OUTPATIENT
Start: 2022-10-05 | End: 2022-10-07

## 2022-10-04 RX ORDER — LEVETIRACETAM 250 MG/1
250 TABLET, FILM COATED ORAL
Refills: 0 | Status: DISCONTINUED | OUTPATIENT
Start: 2022-10-04 | End: 2022-10-06

## 2022-10-04 RX ORDER — SODIUM CHLORIDE 9 MG/ML
1000 INJECTION INTRAMUSCULAR; INTRAVENOUS; SUBCUTANEOUS
Refills: 0 | Status: COMPLETED | OUTPATIENT
Start: 2022-10-04 | End: 2022-10-04

## 2022-10-04 RX ADMIN — LEVETIRACETAM 250 MILLIGRAM(S): 250 TABLET, FILM COATED ORAL at 19:03

## 2022-10-04 RX ADMIN — Medication 650 MILLIGRAM(S): at 22:27

## 2022-10-04 RX ADMIN — DIVALPROEX SODIUM 500 MILLIGRAM(S): 500 TABLET, DELAYED RELEASE ORAL at 19:03

## 2022-10-04 RX ADMIN — SODIUM CHLORIDE 75 MILLILITER(S): 9 INJECTION INTRAMUSCULAR; INTRAVENOUS; SUBCUTANEOUS at 22:35

## 2022-10-04 RX ADMIN — Medication 650 MILLIGRAM(S): at 10:18

## 2022-10-04 RX ADMIN — SIMVASTATIN 20 MILLIGRAM(S): 20 TABLET, FILM COATED ORAL at 22:27

## 2022-10-04 RX ADMIN — Medication 100 MILLIGRAM(S): at 19:02

## 2022-10-04 RX ADMIN — Medication 100 MILLIGRAM(S): at 22:27

## 2022-10-04 RX ADMIN — PIPERACILLIN AND TAZOBACTAM 200 GRAM(S): 4; .5 INJECTION, POWDER, LYOPHILIZED, FOR SOLUTION INTRAVENOUS at 00:53

## 2022-10-04 RX ADMIN — Medication 250 MILLIGRAM(S): at 00:54

## 2022-10-04 RX ADMIN — AZITHROMYCIN 500 MILLIGRAM(S): 500 TABLET, FILM COATED ORAL at 13:35

## 2022-10-04 RX ADMIN — CEFTRIAXONE 100 MILLIGRAM(S): 500 INJECTION, POWDER, FOR SOLUTION INTRAMUSCULAR; INTRAVENOUS at 22:35

## 2022-10-04 NOTE — H&P ADULT - NEUROLOGICAL
details… cranial nerves II-XII intact/sensation intact/responds to pain/responds to verbal commands/deep reflexes intact/cranial nerves intact

## 2022-10-04 NOTE — ED ADULT NURSE REASSESSMENT NOTE - NS ED NURSE REASSESS COMMENT FT1
Report received from night shift RN, pt in NAD, admitting team at bedside assessing pt, will continue to monitor.

## 2022-10-04 NOTE — H&P ADULT - GASTROINTESTINAL
soft/nontender/nondistended/normal active bowel sounds/no guarding/no rigidity/no organomegaly/no palpable cristopher details…

## 2022-10-04 NOTE — H&P ADULT - PROBLEM SELECTOR PLAN 1
- 1 week history of productive cough, 2 day history of weakness  - fever of 103.3 (+) hMPV of RVP  - COVID-19  - UA sent: Non revealing , UC: Sent  - Blood cultures: Sent   - CT Head/ Cervical spine: No acute changes  - CXR: clear  EKG: ordered  - s/p benzonatate  - s/p empiric vanco 1g/ 3.375 zosyn   - pain management/ antipyretic: acetaminophen 975mg  - PT/OT consulted recommendations appreciated   - vitals q8h - Admit to medicine  - 1 week history of productive cough, 2 day history of weakness  - CXR clear  - fever of 103.3 and RVP + for hMPV   - UA negative; UCx and BCx  Sent  - s/p empiric vanco 1g/ 3.375 zosyn  - monitor off further abx  - pain management/ antipyretic: acetaminophen 975mg  - PT/OT consulted recommendations appreciated   - vitals q8h - Admit to medicine  - 1 week history of productive cough, 2 day history of weakness  - CXR clear  - fever of 103.3 and RVP + for hMPV   - UA negative; UCx and BCx  Sent  - s/p empiric vanco 1g/ 3.375 zosyn  - for now will cover empirically with Azithro given high fever and may have atypical PNA.   - CT chest ordered  - pain management/ antipyretic: acetaminophen 975mg  - PT/OT consulted recommendations appreciated   - vitals q8h

## 2022-10-04 NOTE — H&P ADULT - NEGATIVE NEUROLOGICAL SYMPTOMS
no transient paralysis/no weakness/no paresthesias/no generalized seizures/no syncope/no tremors/no vertigo/no difficulty walking/no headache/no loss of consciousness

## 2022-10-04 NOTE — H&P ADULT - PROBLEM SELECTOR PLAN 4
- continue lisinopril with holding parameters  - dash diet - Platelets: 50k; Repeat  PLT 50k  - trend platelets   - Likely 2/2 viral infection however possibility of Drug induced throbocytopenia 2/2 depakote?   - Check depakote level  - no current indication for transfusion.

## 2022-10-04 NOTE — ED ADULT NURSE REASSESSMENT NOTE - NS ED NURSE REASSESS COMMENT FT1
Break coverage RN: pt A&Ox4, respirations even and unlabored. Pt BP within lower limits, pt placed on cardiac monitor, sating 92% on RA, pt placed on 2L o2 via NC. Pt denies any chest pain or sob at this time. IV site patent. IV abx infusing per order. No acute distress noted. bed in lowest position, side rails up, call bell in hand, safety maintained. Break coverage RN: pt A&Ox4, respirations even and unlabored. Pt BP within lower limits, pt placed on cardiac monitor, sating 92% on RA, pt placed on 2L o2 via NC. Pt denies any chest pain or sob at this time. IV site patent. IV abx infusing per order. No acute distress noted. bed in lowest position, side rails up, call bell in hand, safety maintained.  aware of pt BP and o2 saturation.

## 2022-10-04 NOTE — H&P ADULT - NSHPPHYSICALEXAM_GEN_ALL_CORE
.  VITAL SIGNS:  T(C): 37.3 (10-04-22 @ 11:57), Max: 39.6 (10-03-22 @ 17:27)  T(F): 99.2 (10-04-22 @ 11:57), Max: 103.3 (10-03-22 @ 17:27)  HR: 90 (10-04-22 @ 10:19) (84 - 98)  BP: 124/73 (10-04-22 @ 10:19) (99/71 - 125/79)  BP(mean): 88 (10-04-22 @ 02:15) (88 - 88)  RR: 16 (10-04-22 @ 10:19) (16 - 18)  SpO2: 99% (10-04-22 @ 10:19) (95% - 100%)  Wt(kg): --    PHYSICAL EXAM:    Constitutional: NAD, resting comfortably   Head: NC/AT  Eyes: anicteric sclera  Respiratory: decent air intact, course breath sounds    Cardiac: +S1/S2; RRR; no M/R/G  Gastrointestinal: abdomen soft, non-distended, no rebound or guarding; +BSx4  Extremities: no peripheral edema  Musculoskeletal: NROM x4; no joint swelling, tenderness or erythema  Vascular: 2+ radial, DP pulses B/L  Neurologic: AAOx3; no gross focal deficits  Psychiatric: affect and characteristics of appearance, verbalizations, behaviors are appropriate

## 2022-10-04 NOTE — H&P ADULT - ASSESSMENT
78 y/o Male, with a PmHx of HTN, HLD, focal seizures, presented with a productive cough and generalized weakness, found to be hMVP positive admitted to medicine for management. 76 y/o Male, with a PmHx of HTN, HLD, focal seizures, presented with a productive cough and generalized weakness and falls found to be hMVP positive admitted to medicine for management.

## 2022-10-04 NOTE — H&P ADULT - MUSCULOSKELETAL
details… ROM intact/no joint swelling/no joint erythema/no joint warmth/no calf tenderness/strength 5/5 bilateral upper extremities/strength 5/5 bilateral lower extremities/no chest wall tenderness

## 2022-10-04 NOTE — H&P ADULT - NSHPLABSRESULTS_GEN_ALL_CORE
14.4   7.51  )-----------( 50       ( 03 Oct 2022 21:15 )             44.5       10    132<L>  |  98  |  21  ----------------------------<  128<H>  4.5   |  20<L>  |  1.20    Ca    9.1      03 Oct 2022 21:15    TPro  7.3  /  Alb  4.1  /  TBili  0.4  /  DBili  x   /  AST  47<H>  /  ALT  24  /  AlkPhos  28<L>  10-03      Urinalysis Basic - ( 03 Oct 2022 21:15 )    Color: Yellow / Appearance: Clear / S.030 / pH: x  Gluc: x / Ketone: Small  / Bili: Negative / Urobili: <2 mg/dL   Blood: x / Protein: 100 mg/dL / Nitrite: Negative   Leuk Esterase: Negative / RBC: 2 /HPF / WBC 2 /HPF   Sq Epi: x / Non Sq Epi: 1 /HPF / Bacteria: Negative      SARS-CoV-2: NotDetec (03 Oct 2022 21:30)    RapRVP: (+) Positive hMVP    VBG: pH: 7.47 , Sodium: 126, pCO2: 29, pHCO3: 21 ( 03 Oct 2022 21:30)    CT head: No acute intracranial hemorrhage or mass effect.    CT cervical spine: No evidence for acute displaced fracture or   malalignment. Chronic degenerative changes.    CXR: Clear lungs.    EKG: ordered

## 2022-10-04 NOTE — H&P ADULT - NS ATTEND AMEND GEN_ALL_CORE FT
78 y/o Male, with a PmHx of HTN, HLD, focal seizures, presenting with fevers, chills, productive cough, generalized weakness and falls found to be hMVP positive and thrombocytopenic.     #hMVP Pneumonia   -     #Thrombocytopenia      # Hyponatremia       #Ambulatory Dysfunction 78 y/o Male, with a PmHx of HTN, HLD, focal seizures, presenting with fevers, chills, productive cough, generalized weakness and falls found to be hMVP positive and thrombocytopenic.     #hMVP Pneumonia   - supportive care (IVF prn, fever control with tylenol, cough meds)    - ceft/aizthro for 5 day course for CAP coverage given high fevers   - f/u blood and sputum cultures   - check legionella   - CT scan pending     #Thrombocytopenia  - acute on chronic, normally plt around 150, presenting with 50   - could be due to suppression from AEDs with acute infection on top   - check AED levels   - check HIV, hep c, folate, B12   - if drops further and significantly, consider heme     # Hyponatremia   - SIADH vs hypovolemic hyponatremia   - serum osmol and urin sodium to help diagnose      #Ambulatory Dysfunction  - treat acute illness   - pt eval     rest of care as above     Mimi Hopson MD  [facility] Division of Hospital Medicine  Pager: [phone]  Available via MS Teams

## 2022-10-04 NOTE — H&P ADULT - RESPIRATORY
normal/clear to auscultation bilaterally/no wheezes/no rhonchi/airway patent/breath sounds equal/rales normal/no wheezes/no rhonchi/airway patent/breath sounds equal/rales

## 2022-10-04 NOTE — H&P ADULT - SOCIAL HISTORY: ALCOHOL USE
Prior social usage of alcohol, no recent history to Anti epileptics Prior social usage of alcohol, no recent history due to Anti epileptics

## 2022-10-04 NOTE — ED ADULT NURSE REASSESSMENT NOTE - NS ED NURSE REASSESS COMMENT FT1
Pt in NAD, VSS, morning labs drawn and sent, pt medicated for fever with prn tylenol. will reassess and continue to monitor.

## 2022-10-04 NOTE — H&P ADULT - HISTORY OF PRESENT ILLNESS
76 y/o Male, with a PmHx of HTN, HLD, focal seizures, presented with a productive cough and generalized weakness. Reporting a productive cough with an onset of a week ago. He describes it as "a shots" worth of white color sputum denies seeing blood tinged sputum. He is bringing up sputum every few hours. Warm water has given minor relief, no other alleviating factors. Along with the productive cough he reports feeling generalized weakness for the past 2 days. The generalized weakness has caused 2 falls, the first one occurred in the kitchen, he feel forward from a standing position hitting his head on the kitchen cabinet. The second fall was in his bed room, he was walking from his bed to the closet and fell forward using the closet door for support. Denies hitting the ground or any injuries from the second fall. The weakness is consistent through out the day and made worse due to the fact that he has had a decreased PO intake this week. He states that he has lost his taste sensation and has not had a desire to eat. He otherwise denies LOC, numbness/tingling, dizziness, visual changes, chest pain, palpitations SOB, orthopnea, peripheral edema, abdominal tenderness, melena, hematochezia, urinary incontinence/hesitancy/frequency, dysuria, saddle anesthesia, n/v/d/c. He has voided, had BM and passed gas since the falls.    In the ED he complains of being chilly found to have a fever of 103.3 and hMPV positive. 76 y/o Male, with a PmHx of HTN, HLD, focal seizures, presented with a productive cough and generalized weakness. Reporting a productive cough with an onset of a week ago. He describes it as "a shots" worth of white color sputum denies seeing blood tinged sputum. He is bringing up sputum every few hours. Warm water has given minor relief, no other alleviating factors. Along with the productive cough he reports feeling generalized weakness for the past 2 days. The generalized weakness has caused 2 falls, the first one occurred in the kitchen, he feel forward from a standing position hitting his head on the kitchen cabinet. The second fall was in his bed room, he was walking from his bed to the closet and fell forward using the closet door for support. Denies hitting the ground or any injuries from the second fall. The weakness is consistent through out the day and made worse due to the fact that he has had a decreased PO intake this week. He states that he has lost his taste sensation and has not had a desire to eat. He otherwise denies LOC, numbness/tingling, dizziness, visual changes, chest pain, palpitations SOB, orthopnea, peripheral edema, abdominal tenderness, melena, hematochezia, urinary incontinence/hesitancy/frequency, dysuria, saddle anesthesia, n/v/d/c, dizziness. He has voided, had BM and passed gas since the falls.    In the ED he complains of being chilly found to have a fever of 103.3 and hMPV positive.

## 2022-10-04 NOTE — H&P ADULT - PROBLEM SELECTOR PLAN 2
- Decreased PO intake; Sodium: 132  - trend BMP   - assess urine osmolality , urine lytes   - Slow correction when volume status is apparent     - fluid replenishment with NS .9 100cc/hr ? - Patient reports 2 falls prior to arrival.   - CT head without bleeding or infarct and CT C-Spine without fracture.   - Orthostatics ordered  - Fall risk protocols and PT consult

## 2022-10-04 NOTE — H&P ADULT - NSICDXPASTSURGICALHX_GEN_ALL_CORE_FT
Obstetrical Discharge Form    Gestational Age:  38w11d    Antepartum complications: new onset GHTN at time of delivery    Date of Delivery:   22      Type of Delivery:   vaginal, spontaneous    Delivered By:     Marissa Sneed MD             Baby:       Information for the patient's :  Vicente Knutsondra [[id number]]        Anesthesia:    Epidural    Intrapartum complications: None    Feeding method:   breast    Postpartum complications: GHTN    Discharge Date:   7/10/22    Condition of discharge:  good    Plan:   Follow up    in 1 wk for BP check, then again in 6 week(s) PAST SURGICAL HISTORY:  S/P left rotator cuff repair     S/P right rotator cuff repair

## 2022-10-04 NOTE — H&P ADULT - PROBLEM SELECTOR PLAN 3
- Platelets: 50k  - trend platelets   - transfuse if plt fall under 20k - Decreased PO intake; Sodium: 132  - trend BMP   - assess urine and serum osmolality , urine lytes   - Slow correction when volume status is apparent   - fluid replenishment with NS .9 75cc/hr  x10 hours

## 2022-10-05 LAB
A1C WITH ESTIMATED AVERAGE GLUCOSE RESULT: 6.2 % — HIGH (ref 4–5.6)
ANION GAP SERPL CALC-SCNC: 14 MMOL/L — SIGNIFICANT CHANGE UP (ref 7–14)
BUN SERPL-MCNC: 19 MG/DL — SIGNIFICANT CHANGE UP (ref 7–23)
CALCIUM SERPL-MCNC: 8.5 MG/DL — SIGNIFICANT CHANGE UP (ref 8.4–10.5)
CHLORIDE SERPL-SCNC: 99 MMOL/L — SIGNIFICANT CHANGE UP (ref 98–107)
CHOLEST SERPL-MCNC: 96 MG/DL — SIGNIFICANT CHANGE UP
CO2 SERPL-SCNC: 19 MMOL/L — LOW (ref 22–31)
CREAT SERPL-MCNC: 1.14 MG/DL — SIGNIFICANT CHANGE UP (ref 0.5–1.3)
CULTURE RESULTS: SIGNIFICANT CHANGE UP
EGFR: 67 ML/MIN/1.73M2 — SIGNIFICANT CHANGE UP
ESTIMATED AVERAGE GLUCOSE: 131 — SIGNIFICANT CHANGE UP
FOLATE SERPL-MCNC: 20 NG/ML — HIGH (ref 3.1–17.5)
GLUCOSE SERPL-MCNC: 100 MG/DL — HIGH (ref 70–99)
HCT VFR BLD CALC: 41.4 % — SIGNIFICANT CHANGE UP (ref 39–50)
HCV AB S/CO SERPL IA: 0.06 S/CO — SIGNIFICANT CHANGE UP (ref 0–0.99)
HCV AB SERPL-IMP: SIGNIFICANT CHANGE UP
HDLC SERPL-MCNC: 22 MG/DL — LOW
HGB BLD-MCNC: 13.4 G/DL — SIGNIFICANT CHANGE UP (ref 13–17)
HIV 1+2 AB+HIV1 P24 AG SERPL QL IA: SIGNIFICANT CHANGE UP
LIPID PNL WITH DIRECT LDL SERPL: 52 MG/DL — SIGNIFICANT CHANGE UP
MAGNESIUM SERPL-MCNC: 2.1 MG/DL — SIGNIFICANT CHANGE UP (ref 1.6–2.6)
MCHC RBC-ENTMCNC: 29.8 PG — SIGNIFICANT CHANGE UP (ref 27–34)
MCHC RBC-ENTMCNC: 32.4 GM/DL — SIGNIFICANT CHANGE UP (ref 32–36)
MCV RBC AUTO: 92 FL — SIGNIFICANT CHANGE UP (ref 80–100)
NON HDL CHOLESTEROL: 74 MG/DL — SIGNIFICANT CHANGE UP
NRBC # BLD: 0 /100 WBCS — SIGNIFICANT CHANGE UP (ref 0–0)
NRBC # FLD: 0 K/UL — SIGNIFICANT CHANGE UP (ref 0–0)
OSMOLALITY SERPL: 280 MOSM/KG — SIGNIFICANT CHANGE UP (ref 275–295)
PHOSPHATE SERPL-MCNC: 3.4 MG/DL — SIGNIFICANT CHANGE UP (ref 2.5–4.5)
PLATELET # BLD AUTO: 35 K/UL — LOW (ref 150–400)
POTASSIUM SERPL-MCNC: 4.3 MMOL/L — SIGNIFICANT CHANGE UP (ref 3.5–5.3)
POTASSIUM SERPL-SCNC: 4.3 MMOL/L — SIGNIFICANT CHANGE UP (ref 3.5–5.3)
RBC # BLD: 4.5 M/UL — SIGNIFICANT CHANGE UP (ref 4.2–5.8)
RBC # FLD: 14.2 % — SIGNIFICANT CHANGE UP (ref 10.3–14.5)
SODIUM SERPL-SCNC: 132 MMOL/L — LOW (ref 135–145)
SPECIMEN SOURCE: SIGNIFICANT CHANGE UP
TRIGL SERPL-MCNC: 109 MG/DL — SIGNIFICANT CHANGE UP
VALPROATE SERPL-MCNC: 70.6 UG/ML — SIGNIFICANT CHANGE UP (ref 50–100)
VIT B12 SERPL-MCNC: 1565 PG/ML — HIGH (ref 200–900)
WBC # BLD: 9.89 K/UL — SIGNIFICANT CHANGE UP (ref 3.8–10.5)
WBC # FLD AUTO: 9.89 K/UL — SIGNIFICANT CHANGE UP (ref 3.8–10.5)

## 2022-10-05 PROCEDURE — 99233 SBSQ HOSP IP/OBS HIGH 50: CPT

## 2022-10-05 RX ADMIN — LEVETIRACETAM 250 MILLIGRAM(S): 250 TABLET, FILM COATED ORAL at 06:45

## 2022-10-05 RX ADMIN — DIVALPROEX SODIUM 500 MILLIGRAM(S): 500 TABLET, DELAYED RELEASE ORAL at 19:50

## 2022-10-05 RX ADMIN — LEVETIRACETAM 250 MILLIGRAM(S): 250 TABLET, FILM COATED ORAL at 19:16

## 2022-10-05 RX ADMIN — LISINOPRIL 10 MILLIGRAM(S): 2.5 TABLET ORAL at 06:45

## 2022-10-05 RX ADMIN — CEFTRIAXONE 100 MILLIGRAM(S): 500 INJECTION, POWDER, FOR SOLUTION INTRAMUSCULAR; INTRAVENOUS at 21:48

## 2022-10-05 RX ADMIN — SIMVASTATIN 20 MILLIGRAM(S): 20 TABLET, FILM COATED ORAL at 21:47

## 2022-10-05 RX ADMIN — Medication 100 MILLIGRAM(S): at 17:38

## 2022-10-05 RX ADMIN — AZITHROMYCIN 255 MILLIGRAM(S): 500 TABLET, FILM COATED ORAL at 12:19

## 2022-10-05 RX ADMIN — Medication 100 MILLIGRAM(S): at 06:45

## 2022-10-05 RX ADMIN — DIVALPROEX SODIUM 500 MILLIGRAM(S): 500 TABLET, DELAYED RELEASE ORAL at 06:45

## 2022-10-05 RX ADMIN — Medication 100 MILLIGRAM(S): at 21:47

## 2022-10-05 NOTE — PROGRESS NOTE ADULT - PROBLEM SELECTOR PLAN 4
Likely 2/2 viral infection however possibility of Drug induced throbocytopenia 2/2 depakote?   No current signs or symptoms of bleeding     - Trend platelets . Can obtain plt level in blue top   -depakote levels w/i range   - Monitor levels closely and Transfuse as needed

## 2022-10-05 NOTE — PROGRESS NOTE ADULT - PROBLEM SELECTOR PLAN 7
DVT prophylaxis: Due to thrombocytopenia will use SCD.    Seizure - c/w home antiepileptics     Abdnormal Imaging- CT chest showed New 1.4 cm right upper lobe nodule likely related to infection process however a repeat chest CT scan is recommended by radiology in 6 weeks following treatment to assess for resolution.    Dispo- pending further medical optimization

## 2022-10-05 NOTE — PROGRESS NOTE ADULT - PROBLEM SELECTOR PLAN 1
With possible superimposed bacterial PNA     CT chest showing  New  upper lobe and both lower lobes findings c/f infection or aspiration and new 1.4 cm right upper lobe nodule.  UA negative; UCx and BCx  Sent    - C/w azithromycin and ceftriaxone for now. Will consider anaerobic coverage if pt not improving. However pt denies ever choking or coughing with food. F/u urine legionella and dc azithromycin if neg  -trend fever curve   -monitor hemodynamics and O2 requirements

## 2022-10-05 NOTE — PROGRESS NOTE ADULT - PROBLEM SELECTOR PLAN 2
Patient reports 2 falls prior to arrival.   CT head without bleeding or infarct and CT C-Spine without fracture.     -F/u Orthostatics   -Fall risk protocols  -PT consult

## 2022-10-05 NOTE — PHYSICAL THERAPY INITIAL EVALUATION ADULT - PERTINENT HX OF CURRENT PROBLEM, REHAB EVAL
77 year old male presented with a productive cough and generalized weakness and falls found to be hMVP positive. Also s/p CT chest showing new upper lobe and both lower lobes findings for infection or aspiration and new 1.4 cm right upper lobe nodule.

## 2022-10-05 NOTE — PROGRESS NOTE ADULT - SUBJECTIVE AND OBJECTIVE BOX
Patient is a 77y old  Male who presents with a chief complaint of Productive cough and Generalized weakness (04 Oct 2022 08:59)      SUBJECTIVE / OVERNIGHT EVENTS:    Review of Systems:     MEDICATIONS  (STANDING):  azithromycin  IVPB      azithromycin  IVPB 500 milliGRAM(s) IV Intermittent every 24 hours  benzonatate 100 milliGRAM(s) Oral three times a day  cefTRIAXone   IVPB 1000 milliGRAM(s) IV Intermittent every 24 hours  diVALproex  milliGRAM(s) Oral <User Schedule>  levETIRAcetam 250 milliGRAM(s) Oral two times a day  lisinopril 10 milliGRAM(s) Oral daily  simvastatin 20 milliGRAM(s) Oral at bedtime  sodium chloride 7% Inhalation 4 milliLiter(s) Inhalation once    MEDICATIONS  (PRN):  acetaminophen     Tablet .. 650 milliGRAM(s) Oral every 6 hours PRN Temp greater or equal to 38C (100.4F), Mild Pain (1 - 3)  melatonin 3 milliGRAM(s) Oral at bedtime PRN Insomnia      PHYSICAL EXAM:    I&O's Summary    ICU Vital Signs Last 24 Hrs  T(C): 37.1 (05 Oct 2022 09:13), Max: 38.4 (04 Oct 2022 10:19)  T(F): 98.7 (05 Oct 2022 09:13), Max: 101.2 (04 Oct 2022 10:19)  HR: 87 (05 Oct 2022 09:13) (72 - 90)  BP: 106/68 (05 Oct 2022 09:13) (105/80 - 153/82)  BP(mean): --  ABP: --  ABP(mean): --  RR: 18 (05 Oct 2022 09:13) (16 - 21)  SpO2: 99% (05 Oct 2022 09:13) (96% - 99%)    O2 Parameters below as of 05 Oct 2022 09:13  Patient On (Oxygen Delivery Method): nasal cannula          LABS:  CAPILLARY BLOOD GLUCOSE                              13.4   9.89  )-----------( 35       ( 05 Oct 2022 06:00 )             41.4     10-05    132<L>  |  99  |  19  ----------------------------<  100<H>  4.3   |  19<L>  |  1.14    Ca    8.5      05 Oct 2022 06:00  Phos  3.4     10-05  Mg     2.10     10-05    TPro  7.3  /  Alb  4.1  /  TBili  0.4  /  DBili  x   /  AST  47<H>  /  ALT  24  /  AlkPhos  28<L>  10-03    PT/INR - ( 03 Oct 2022 21:15 )   PT: 14.0 sec;   INR: 1.20 ratio         PTT - ( 03 Oct 2022 21:15 )  PTT:24.8 sec  CARDIAC MARKERS ( 04 Oct 2022 09:55 )  x     / x     / 585 U/L / x     / x      CARDIAC MARKERS ( 03 Oct 2022 21:15 )  x     / x     / 617 U/L / x     / x          Urinalysis Basic - ( 03 Oct 2022 21:15 )    Color: Yellow / Appearance: Clear / S.030 / pH: x  Gluc: x / Ketone: Small  / Bili: Negative / Urobili: <2 mg/dL   Blood: x / Protein: 100 mg/dL / Nitrite: Negative   Leuk Esterase: Negative / RBC: 2 /HPF / WBC 2 /HPF   Sq Epi: x / Non Sq Epi: 1 /HPF / Bacteria: Negative        RADIOLOGY & ADDITIONAL TESTS:    Imaging Personally Reviewed:    Consultant(s) Notes Reviewed:      Care Discussed with Consultants/Other Providers: Patient is a 77y old  Male who presents with a chief complaint of Productive cough and Generalized weakness (04 Oct 2022 08:59)      SUBJECTIVE / OVERNIGHT EVENTS: Pt denies CP. Has some SOB and + productive cough. Overall improved compared to admission but not at baseline. No n/v/d.    Review of Systems:     MEDICATIONS  (STANDING):  azithromycin  IVPB      azithromycin  IVPB 500 milliGRAM(s) IV Intermittent every 24 hours  benzonatate 100 milliGRAM(s) Oral three times a day  cefTRIAXone   IVPB 1000 milliGRAM(s) IV Intermittent every 24 hours  diVALproex  milliGRAM(s) Oral <User Schedule>  levETIRAcetam 250 milliGRAM(s) Oral two times a day  lisinopril 10 milliGRAM(s) Oral daily  simvastatin 20 milliGRAM(s) Oral at bedtime  sodium chloride 7% Inhalation 4 milliLiter(s) Inhalation once    MEDICATIONS  (PRN):  acetaminophen     Tablet .. 650 milliGRAM(s) Oral every 6 hours PRN Temp greater or equal to 38C (100.4F), Mild Pain (1 - 3)  melatonin 3 milliGRAM(s) Oral at bedtime PRN Insomnia      PHYSICAL EXAM:    I&O's Summary    ICU Vital Signs Last 24 Hrs  T(C): 37.1 (05 Oct 2022 09:13), Max: 38.4 (04 Oct 2022 10:19)  T(F): 98.7 (05 Oct 2022 09:13), Max: 101.2 (04 Oct 2022 10:19)  HR: 87 (05 Oct 2022 09:13) (72 - 90)  BP: 106/68 (05 Oct 2022 09:13) (105/80 - 153/82)  BP(mean): --  ABP: --  ABP(mean): --  RR: 18 (05 Oct 2022 09:13) (16 - 21)  SpO2: 99% (05 Oct 2022 09:13) (96% - 99%)    O2 Parameters below as of 05 Oct 2022 09:13  Patient On (Oxygen Delivery Method): nasal cannula    Constitutional: NAD,  Head: NC/AT  Eyes: anicteric sclera  Respiratory: decent air intact, course breath sounds , intermittently coughing    Cardiac: +S1/S2; RRR; no M/R/G  Gastrointestinal: abdomen soft, non-distended, no rebound or guarding; +BSx4  Extremities: no peripheral edema  Musculoskeletal: NROM x4; no joint swelling, tenderness or erythema  Vascular: 2+ radial, DP pulses B/L  Neurologic: AAOx3; no gross focal deficits  Psychiatric: affect and characteristics of appearance, verbalizations, behaviors are appropriate      LABS:  CAPILLARY BLOOD GLUCOSE                              13.4   9.89  )-----------( 35       ( 05 Oct 2022 06:00 )             41.4     10-05    132<L>  |  99  |  19  ----------------------------<  100<H>  4.3   |  19<L>  |  1.14    Ca    8.5      05 Oct 2022 06:00  Phos  3.4     10-05  Mg     2.10     10-05    TPro  7.3  /  Alb  4.1  /  TBili  0.4  /  DBili  x   /  AST  47<H>  /  ALT  24  /  AlkPhos  28<L>  10-03    PT/INR - ( 03 Oct 2022 21:15 )   PT: 14.0 sec;   INR: 1.20 ratio         PTT - ( 03 Oct 2022 21:15 )  PTT:24.8 sec  CARDIAC MARKERS ( 04 Oct 2022 09:55 )  x     / x     / 585 U/L / x     / x      CARDIAC MARKERS ( 03 Oct 2022 21:15 )  x     / x     / 617 U/L / x     / x          Urinalysis Basic - ( 03 Oct 2022 21:15 )    Color: Yellow / Appearance: Clear / S.030 / pH: x  Gluc: x / Ketone: Small  / Bili: Negative / Urobili: <2 mg/dL   Blood: x / Protein: 100 mg/dL / Nitrite: Negative   Leuk Esterase: Negative / RBC: 2 /HPF / WBC 2 /HPF   Sq Epi: x / Non Sq Epi: 1 /HPF / Bacteria: Negative        RADIOLOGY & ADDITIONAL TESTS:    Imaging Personally Reviewed:    Consultant(s) Notes Reviewed:      Care Discussed with Consultants/Other Providers:

## 2022-10-05 NOTE — PROGRESS NOTE ADULT - NS ATTEND AMEND GEN_ALL_CORE FT
76 y/o Male, with a PmHx of HTN, HLD, focal seizures, presenting with fevers, chills, productive cough, generalized weakness and falls found to be hMVP positive and thrombocytopenic.     #hMVP Pneumonia   - supportive care (IVF prn, fever control with tylenol, cough meds)    - ceft/aizthro for 5 day course for CAP coverage given high fevers   - f/u blood and sputum cultures   - check legionella   - CT scan pending     #Thrombocytopenia  - acute on chronic, normally plt around 150, presenting with 50   - could be due to suppression from AEDs with acute infection on top   - check AED levels   - check HIV, hep c, folate, B12   - if drops further and significantly, consider heme     # Hyponatremia   - SIADH vs hypovolemic hyponatremia   - serum osmol and urin sodium to help diagnose      #Ambulatory Dysfunction  - treat acute illness   - pt eval     rest of care as above     Mimi Hopson MD  [facility] Division of Hospital Medicine  Pager: [phone]  Available via MS Teams

## 2022-10-05 NOTE — PHYSICAL THERAPY INITIAL EVALUATION ADULT - ADDITIONAL COMMENTS
Pt states he lives alone in a first floor apartment with no steps to enter. Prior to admission pt reports ambulating independently without a device and being independent in ADLs. Pt endorses a fall while getting dressed.    Following evaluation, pt was left semireclined in stretcher in no distress, all lines in tact, call bell in reach.

## 2022-10-06 DIAGNOSIS — R56.9 UNSPECIFIED CONVULSIONS: ICD-10-CM

## 2022-10-06 LAB
ANION GAP SERPL CALC-SCNC: 11 MMOL/L — SIGNIFICANT CHANGE UP (ref 7–14)
BLD GP AB SCN SERPL QL: NEGATIVE — SIGNIFICANT CHANGE UP
BUN SERPL-MCNC: 18 MG/DL — SIGNIFICANT CHANGE UP (ref 7–23)
CALCIUM SERPL-MCNC: 8.8 MG/DL — SIGNIFICANT CHANGE UP (ref 8.4–10.5)
CHLORIDE SERPL-SCNC: 100 MMOL/L — SIGNIFICANT CHANGE UP (ref 98–107)
CLOSURE TME COLL+EPINEP BLD: 30 K/UL — LOW (ref 150–400)
CO2 SERPL-SCNC: 23 MMOL/L — SIGNIFICANT CHANGE UP (ref 22–31)
CREAT SERPL-MCNC: 0.96 MG/DL — SIGNIFICANT CHANGE UP (ref 0.5–1.3)
EGFR: 82 ML/MIN/1.73M2 — SIGNIFICANT CHANGE UP
FIBRINOGEN PPP-MCNC: 701 MG/DL — HIGH (ref 330–520)
GLUCOSE SERPL-MCNC: 106 MG/DL — HIGH (ref 70–99)
HCT VFR BLD CALC: 41.1 % — SIGNIFICANT CHANGE UP (ref 39–50)
HGB BLD-MCNC: 13.2 G/DL — SIGNIFICANT CHANGE UP (ref 13–17)
LEGIONELLA AG UR QL: NEGATIVE — SIGNIFICANT CHANGE UP
MAGNESIUM SERPL-MCNC: 2.2 MG/DL — SIGNIFICANT CHANGE UP (ref 1.6–2.6)
MCHC RBC-ENTMCNC: 29.5 PG — SIGNIFICANT CHANGE UP (ref 27–34)
MCHC RBC-ENTMCNC: 32.1 GM/DL — SIGNIFICANT CHANGE UP (ref 32–36)
MCV RBC AUTO: 91.7 FL — SIGNIFICANT CHANGE UP (ref 80–100)
NRBC # BLD: 0 /100 WBCS — SIGNIFICANT CHANGE UP (ref 0–0)
NRBC # FLD: 0 K/UL — SIGNIFICANT CHANGE UP (ref 0–0)
PHOSPHATE SERPL-MCNC: 3.1 MG/DL — SIGNIFICANT CHANGE UP (ref 2.5–4.5)
PLATELET # BLD AUTO: 46 K/UL — LOW (ref 150–400)
POTASSIUM SERPL-MCNC: 4.2 MMOL/L — SIGNIFICANT CHANGE UP (ref 3.5–5.3)
POTASSIUM SERPL-SCNC: 4.2 MMOL/L — SIGNIFICANT CHANGE UP (ref 3.5–5.3)
RBC # BLD: 4.48 M/UL — SIGNIFICANT CHANGE UP (ref 4.2–5.8)
RBC # FLD: 14.3 % — SIGNIFICANT CHANGE UP (ref 10.3–14.5)
RH IG SCN BLD-IMP: POSITIVE — SIGNIFICANT CHANGE UP
SODIUM SERPL-SCNC: 134 MMOL/L — LOW (ref 135–145)
WBC # BLD: 8.15 K/UL — SIGNIFICANT CHANGE UP (ref 3.8–10.5)
WBC # FLD AUTO: 8.15 K/UL — SIGNIFICANT CHANGE UP (ref 3.8–10.5)

## 2022-10-06 PROCEDURE — 99233 SBSQ HOSP IP/OBS HIGH 50: CPT | Mod: GC

## 2022-10-06 RX ADMIN — LISINOPRIL 10 MILLIGRAM(S): 2.5 TABLET ORAL at 05:54

## 2022-10-06 RX ADMIN — Medication 100 MILLIGRAM(S): at 05:54

## 2022-10-06 RX ADMIN — DIVALPROEX SODIUM 500 MILLIGRAM(S): 500 TABLET, DELAYED RELEASE ORAL at 06:51

## 2022-10-06 RX ADMIN — DIVALPROEX SODIUM 500 MILLIGRAM(S): 500 TABLET, DELAYED RELEASE ORAL at 17:46

## 2022-10-06 RX ADMIN — CEFTRIAXONE 100 MILLIGRAM(S): 500 INJECTION, POWDER, FOR SOLUTION INTRAMUSCULAR; INTRAVENOUS at 22:44

## 2022-10-06 RX ADMIN — LEVETIRACETAM 250 MILLIGRAM(S): 250 TABLET, FILM COATED ORAL at 05:54

## 2022-10-06 RX ADMIN — Medication 100 MILLIGRAM(S): at 22:44

## 2022-10-06 RX ADMIN — SIMVASTATIN 20 MILLIGRAM(S): 20 TABLET, FILM COATED ORAL at 22:44

## 2022-10-06 RX ADMIN — AZITHROMYCIN 255 MILLIGRAM(S): 500 TABLET, FILM COATED ORAL at 12:08

## 2022-10-06 NOTE — PROGRESS NOTE ADULT - SUBJECTIVE AND OBJECTIVE BOX
***************************************************************  Rex Funes, PGY1  Internal Medicine   TEAMS Preferred  [facility] Pager [phone]  [facility] Pager [phone]  ***************************************************************    MICHAEL RODRIGUEZ  77y  MRN: [id number]  10-04-22 (2d)    Patient is a 77y old  Male who presents with a chief complaint of Productive cough and Generalized weakness (05 Oct 2022 10:09)      SUBJECTIVE / OVERNIGHT EVENTS:   No acute overnight events. Pt seen and examined at bedside. Denies fevers, chills, CP, SOB, Abdominal pain, N/V, Constipation, Diarrhea. Last BM     12 Point ROS negative with the exception of the above    MEDICATIONS  (STANDING):  azithromycin  IVPB      azithromycin  IVPB 500 milliGRAM(s) IV Intermittent every 24 hours  benzonatate 100 milliGRAM(s) Oral three times a day  cefTRIAXone   IVPB 1000 milliGRAM(s) IV Intermittent every 24 hours  diVALproex  milliGRAM(s) Oral <User Schedule>  levETIRAcetam 250 milliGRAM(s) Oral two times a day  lisinopril 10 milliGRAM(s) Oral daily  simvastatin 20 milliGRAM(s) Oral at bedtime  sodium chloride 7% Inhalation 4 milliLiter(s) Inhalation once    MEDICATIONS  (PRN):  acetaminophen     Tablet .. 650 milliGRAM(s) Oral every 6 hours PRN Temp greater or equal to 38C (100.4F), Mild Pain (1 - 3)  melatonin 3 milliGRAM(s) Oral at bedtime PRN Insomnia      OBJECTIVE:  Vital Signs Last 24 Hrs  T(C): 36.8 (06 Oct 2022 05:16), Max: 37.7 (05 Oct 2022 21:39)  T(F): 98.3 (06 Oct 2022 05:16), Max: 99.9 (05 Oct 2022 21:39)  HR: 72 (06 Oct 2022 05:16) (72 - 87)  BP: 121/81 (06 Oct 2022 05:16) (97/69 - 128/86)  BP(mean): --  RR: 17 (06 Oct 2022 05:16) (17 - 21)  SpO2: 95% (06 Oct 2022 05:16) (95% - 100%)    Parameters below as of 06 Oct 2022 05:16  Patient On (Oxygen Delivery Method): nasal cannula  O2 Flow (L/min): 2      I&O's Summary      PHYSICAL EXAM:  GENERAL: Laying comfortably, NAD  HEENT: NCAT, PERRLA, EOMI, no scleral icterus, no LAD  NECK: No JVD  LUNG: CTABL; No wheezes, crackles, or rhonchi  HEART: RRR; normal S1/S2; No murmurs, rubs, or gallops  ABDOMEN: +BS, soft, nontender, nondistended, no HSM; No rebound, guarding, or rigidity  EXTREMITIES:  No LE edema b/l, 2+ Peripheral Pulses, No clubbing or cyanosis  NEUROLOGY: AOx3, non-focal, strength 5/5 in all extremities, sensation intact  PSYCH: calm and cooperative  SKIN: No rashes or lesions    LABS:                        13.4   9.89  )-----------( 35       ( 05 Oct 2022 06:00 )             41.4     Auto Eosinophil # x     / Auto Eosinophil % x     / Auto Neutrophil # x     / Auto Neutrophil % x     / BANDS % x                            13.7   8.62  )-----------( 50       ( 04 Oct 2022 09:55 )             41.8     Auto Eosinophil # x     / Auto Eosinophil % x     / Auto Neutrophil # x     / Auto Neutrophil % x     / BANDS % x        10-05    132<L>  |  99  |  19  ----------------------------<  100<H>  4.3   |  19<L>  |  1.14  10-04    130<L>  |  97<L>  |  22  ----------------------------<  120<H>  4.4   |  21<L>  |  1.14  10-03    132<L>  |  98  |  21  ----------------------------<  128<H>  4.5   |  20<L>  |  1.20    Ca    8.5      05 Oct 2022 06:00  Ca    8.8      04 Oct 2022 09:55  Ca    9.1      03 Oct 2022 21:15  Phos  3.4     10-05  Mg     2.10     10-05    TPro  7.3  /  Alb  4.1  /  TBili  0.4  /  DBili  x   /  AST  47<H>  /  ALT  24  /  AlkPhos  28<L>  10-03      CARDIAC MARKERS ( 04 Oct 2022 09:55 )  x     / x     / 585 U/L / x     / x                CAPILLARY BLOOD GLUCOSE            RADIOLOGY & ADDITIONAL TESTS:     ***************************************************************  Rex Funes, PGY1  Internal Medicine   TEAMS Preferred  [facility] Pager [phone]  [facility] Pager [phone]  ***************************************************************    MICHAEL RODRIGUEZ  77y  MRN: [id number]  10-04-22 (2d)    Patient is a 77y old  Male who presents with a chief complaint of Productive cough and Generalized weakness (05 Oct 2022 10:09)      SUBJECTIVE / OVERNIGHT EVENTS:   No acute overnight events. Pt seen and examined at bedside. Pt reports feeling well overall, but not back at baseline. Denies any SOB and CP. Reports continued productive cough. States that he was not feeling well enough to try to walk with PT yesterday.     12 Point ROS negative with the exception of the above    MEDICATIONS  (STANDING):  azithromycin  IVPB      azithromycin  IVPB 500 milliGRAM(s) IV Intermittent every 24 hours  benzonatate 100 milliGRAM(s) Oral three times a day  cefTRIAXone   IVPB 1000 milliGRAM(s) IV Intermittent every 24 hours  diVALproex  milliGRAM(s) Oral <User Schedule>  levETIRAcetam 250 milliGRAM(s) Oral two times a day  lisinopril 10 milliGRAM(s) Oral daily  simvastatin 20 milliGRAM(s) Oral at bedtime  sodium chloride 7% Inhalation 4 milliLiter(s) Inhalation once    MEDICATIONS  (PRN):  acetaminophen     Tablet .. 650 milliGRAM(s) Oral every 6 hours PRN Temp greater or equal to 38C (100.4F), Mild Pain (1 - 3)  melatonin 3 milliGRAM(s) Oral at bedtime PRN Insomnia      OBJECTIVE:  Vital Signs Last 24 Hrs  T(C): 36.8 (06 Oct 2022 05:16), Max: 37.7 (05 Oct 2022 21:39)  T(F): 98.3 (06 Oct 2022 05:16), Max: 99.9 (05 Oct 2022 21:39)  HR: 72 (06 Oct 2022 05:16) (72 - 87)  BP: 121/81 (06 Oct 2022 05:16) (97/69 - 128/86)  BP(mean): --  RR: 17 (06 Oct 2022 05:16) (17 - 21)  SpO2: 95% (06 Oct 2022 05:16) (95% - 100%)    Parameters below as of 06 Oct 2022 05:16  Patient On (Oxygen Delivery Method): nasal cannula  O2 Flow (L/min): 2      I&O's Summary      PHYSICAL EXAM:  GENERAL: Laying comfortably, NAD  HEENT: NCAT, PERRLA, EOMI, no scleral icterus  LUNG: intermittent coughing, CTABL, no wheezing  HEART: RRR; normal S1/S2; No murmurs, rubs, or gallops  ABDOMEN: +BS, soft, nontender, nondistended  EXTREMITIES:  No LE edema b/l, 2+ Peripheral Pulses, No clubbing or cyanosis  NEUROLOGY: AOx3, non-focal, strength 5/5 in all extremities, sensation intact  PSYCH: calm and cooperative  SKIN: No rashes or lesions    LABS:                         13.2   8.15  )-----------( 46       ( 06 Oct 2022 06:32 )             41.1                        13.4   9.89  )-----------( 35       ( 05 Oct 2022 06:00 )             41.4     Auto Eosinophil # x     / Auto Eosinophil % x     / Auto Neutrophil # x     / Auto Neutrophil % x     / BANDS % x                            13.7   8.62  )-----------( 50       ( 04 Oct 2022 09:55 )             41.8     Auto Eosinophil # x     / Auto Eosinophil % x     / Auto Neutrophil # x     / Auto Neutrophil % x     / BANDS % x        10-06    134<L>  |  100  |  18  ----------------------------<  106<H>  4.2   |  23  |  0.96    Ca    8.8      06 Oct 2022 06:32  Phos  3.1     10-06  Mg     2.20     10-06    10-05    132<L>  |  99  |  19  ----------------------------<  100<H>  4.3   |  19<L>  |  1.14  10-04    130<L>  |  97<L>  |  22  ----------------------------<  120<H>  4.4   |  21<L>  |  1.14  10-03    132<L>  |  98  |  21  ----------------------------<  128<H>  4.5   |  20<L>  |  1.20    Ca    8.5      05 Oct 2022 06:00  Ca    8.8      04 Oct 2022 09:55  Ca    9.1      03 Oct 2022 21:15  Phos  3.4     10-05  Mg     2.10     10-05    TPro  7.3  /  Alb  4.1  /  TBili  0.4  /  DBili  x   /  AST  47<H>  /  ALT  24  /  AlkPhos  28<L>  10-03      CARDIAC MARKERS ( 04 Oct 2022 09:55 )  x     / x     / 585 U/L / x     / x

## 2022-10-06 NOTE — SWALLOW BEDSIDE ASSESSMENT ADULT - COMMENTS
Internal Medicine 10/6 "78 y/o Male, with a PmHx of HTN, HLD, focal seizures, presented with a productive cough and generalized weakness and falls found to be hMVP positive. Also s/p CT chest showing New  upper lobe and both lower lobes findings c/f infection or aspiration and new 1.4 cm right upper lobe nodule."    CT Chest 10/4 "New findings in the right upper lobe and both lower lobes favored to be related to infection or aspiration. New 1.4 cm right upper lobe nodule is likely related to this process however a repeat chest CT scan is recommended in 6 weeks following treatment to assess for resolution."    Patient seen at bedside this AM for an initial assessment of the swallow function, at which time patient was alert. Patient is able to verbalize wants/needs and follows directions. Patient with baseline coughing upon arrival.

## 2022-10-06 NOTE — PATIENT PROFILE ADULT - FALL HARM RISK - HARM RISK INTERVENTIONS
Assistance with ambulation/Assistance OOB with selected safe patient handling equipment/Communicate Risk of Fall with Harm to all staff/Monitor for mental status changes/Monitor gait and stability/Move patient closer to nurses' station/Reinforce activity limits and safety measures with patient and family/Reorient to person, place and time as needed/Review medications for side effects contributing to fall risk/Sit up slowly, dangle for a short time, stand at bedside before walking/Tailored Fall Risk Interventions/Visual Cue: Yellow wristband and red socks/Bed in lowest position, wheels locked, appropriate side rails in place/Call bell, personal items and telephone in reach/Instruct patient to call for assistance before getting out of bed or chair/Non-slip footwear when patient is out of bed/[place] to call system/Physically safe environment - no spills, clutter or unnecessary equipment/Purposeful Proactive Rounding/Room/bathroom lighting operational, light cord in reach

## 2022-10-06 NOTE — PROGRESS NOTE ADULT - PROBLEM SELECTOR PLAN 1
With possible superimposed bacterial PNA     CT chest showing  New  upper lobe and both lower lobes findings c/f infection or aspiration and new 1.4 cm right upper lobe nodule.  UA negative; UCx and BCx  Sent    - C/w azithromycin and ceftriaxone for now. Will consider anaerobic coverage if pt not improving. However pt denies ever choking or coughing with food. F/u urine legionella and dc azithromycin if neg  -trend fever curve   -monitor hemodynamics and O2 requirements - With possible superimposed bacterial PNA   - CT chest showing New  upper lobe and both lower lobes findings c/f infection or aspiration and new 1.4 cm right upper lobe nodule. However pt denies ever choking or coughing with food  - speech and swallow - no evidence of aspiration during exam  - UA negative  - f/u UCx, BCx, urine legionella  - C/w azithromycin and ceftriaxone for now.  -trend fever curve   -monitor hemodynamics and O2 requirements  - wean oxygen as tolerated

## 2022-10-06 NOTE — PROGRESS NOTE ADULT - PROBLEM SELECTOR PLAN 7
DVT prophylaxis: Due to thrombocytopenia will use SCD.    Seizure - c/w home antiepileptics     Abdnormal Imaging- CT chest showed New 1.4 cm right upper lobe nodule likely related to infection process however a repeat chest CT scan is recommended by radiology in 6 weeks following treatment to assess for resolution.    Dispo- pending further medical optimization - c/w home antiepileptics (keppra, depakote)

## 2022-10-06 NOTE — SWALLOW BEDSIDE ASSESSMENT ADULT - ASR SWALLOW RECOMMEND DIAG
Consider Cinesophagram at MD's discretion if concern for aspiration pneumonia and given CT Chest results.

## 2022-10-06 NOTE — PROGRESS NOTE ADULT - PROBLEM SELECTOR PLAN 8
DVT prophylaxis: Due to thrombocytopenia will use SCD.    Abdnormal Imaging- CT chest showed New 1.4 cm right upper lobe nodule likely related to infection process however a repeat chest CT scan is recommended by radiology in 6 weeks following treatment to assess for resolution.    Dispo- pending further medical optimization DVT prophylaxis: Due to thrombocytopenia will use SCD.    Abnormal Imaging- CT chest showed New 1.4 cm right upper lobe nodule likely related to infection process however a repeat chest CT scan is recommended by radiology in 6 weeks following treatment to assess for resolution.    Dispo- pending further medical optimization, likely home, f/u PT

## 2022-10-07 ENCOUNTER — TRANSCRIPTION ENCOUNTER (OUTPATIENT)
Age: 77
End: 2022-10-07

## 2022-10-07 LAB
ANION GAP SERPL CALC-SCNC: 14 MMOL/L — SIGNIFICANT CHANGE UP (ref 7–14)
APTT BLD: 24.8 SEC — LOW (ref 27–36.3)
BLD GP AB SCN SERPL QL: NEGATIVE — SIGNIFICANT CHANGE UP
BUN SERPL-MCNC: 18 MG/DL — SIGNIFICANT CHANGE UP (ref 7–23)
CALCIUM SERPL-MCNC: 9 MG/DL — SIGNIFICANT CHANGE UP (ref 8.4–10.5)
CHLORIDE SERPL-SCNC: 102 MMOL/L — SIGNIFICANT CHANGE UP (ref 98–107)
CLOSURE TME COLL+EPINEP BLD: 41 K/UL — LOW (ref 150–400)
CO2 SERPL-SCNC: 19 MMOL/L — LOW (ref 22–31)
CREAT SERPL-MCNC: 0.94 MG/DL — SIGNIFICANT CHANGE UP (ref 0.5–1.3)
EGFR: 84 ML/MIN/1.73M2 — SIGNIFICANT CHANGE UP
GLUCOSE SERPL-MCNC: 115 MG/DL — HIGH (ref 70–99)
HAPTOGLOB SERPL-MCNC: 375 MG/DL — HIGH (ref 34–200)
HCT VFR BLD CALC: 41.7 % — SIGNIFICANT CHANGE UP (ref 39–50)
HGB BLD-MCNC: 13.5 G/DL — SIGNIFICANT CHANGE UP (ref 13–17)
INR BLD: 1.09 RATIO — SIGNIFICANT CHANGE UP (ref 0.88–1.16)
LDH SERPL L TO P-CCNC: 324 U/L — HIGH (ref 135–225)
MAGNESIUM SERPL-MCNC: 2.2 MG/DL — SIGNIFICANT CHANGE UP (ref 1.6–2.6)
MCHC RBC-ENTMCNC: 29.5 PG — SIGNIFICANT CHANGE UP (ref 27–34)
MCHC RBC-ENTMCNC: 32.4 GM/DL — SIGNIFICANT CHANGE UP (ref 32–36)
MCV RBC AUTO: 91.2 FL — SIGNIFICANT CHANGE UP (ref 80–100)
NRBC # BLD: 0 /100 WBCS — SIGNIFICANT CHANGE UP (ref 0–0)
NRBC # FLD: 0 K/UL — SIGNIFICANT CHANGE UP (ref 0–0)
PHOSPHATE SERPL-MCNC: 3.5 MG/DL — SIGNIFICANT CHANGE UP (ref 2.5–4.5)
PLATELET # BLD AUTO: 54 K/UL — LOW (ref 150–400)
POTASSIUM SERPL-MCNC: 4.9 MMOL/L — SIGNIFICANT CHANGE UP (ref 3.5–5.3)
POTASSIUM SERPL-SCNC: 4.9 MMOL/L — SIGNIFICANT CHANGE UP (ref 3.5–5.3)
PROTHROM AB SERPL-ACNC: 12.7 SEC — SIGNIFICANT CHANGE UP (ref 10.5–13.4)
RBC # BLD: 4.57 M/UL — SIGNIFICANT CHANGE UP (ref 4.2–5.8)
RBC # FLD: 14 % — SIGNIFICANT CHANGE UP (ref 10.3–14.5)
RH IG SCN BLD-IMP: POSITIVE — SIGNIFICANT CHANGE UP
SODIUM SERPL-SCNC: 135 MMOL/L — SIGNIFICANT CHANGE UP (ref 135–145)
WBC # BLD: 6.51 K/UL — SIGNIFICANT CHANGE UP (ref 3.8–10.5)
WBC # FLD AUTO: 6.51 K/UL — SIGNIFICANT CHANGE UP (ref 3.8–10.5)

## 2022-10-07 PROCEDURE — 99233 SBSQ HOSP IP/OBS HIGH 50: CPT

## 2022-10-07 PROCEDURE — 99222 1ST HOSP IP/OBS MODERATE 55: CPT | Mod: GC

## 2022-10-07 RX ADMIN — LISINOPRIL 10 MILLIGRAM(S): 2.5 TABLET ORAL at 05:27

## 2022-10-07 RX ADMIN — DIVALPROEX SODIUM 500 MILLIGRAM(S): 500 TABLET, DELAYED RELEASE ORAL at 16:49

## 2022-10-07 RX ADMIN — Medication 100 MILLIGRAM(S): at 05:27

## 2022-10-07 RX ADMIN — DIVALPROEX SODIUM 500 MILLIGRAM(S): 500 TABLET, DELAYED RELEASE ORAL at 05:27

## 2022-10-07 RX ADMIN — SIMVASTATIN 20 MILLIGRAM(S): 20 TABLET, FILM COATED ORAL at 22:20

## 2022-10-07 RX ADMIN — Medication 100 MILLIGRAM(S): at 16:38

## 2022-10-07 RX ADMIN — CEFTRIAXONE 100 MILLIGRAM(S): 500 INJECTION, POWDER, FOR SOLUTION INTRAMUSCULAR; INTRAVENOUS at 22:20

## 2022-10-07 RX ADMIN — Medication 100 MILLIGRAM(S): at 22:20

## 2022-10-07 NOTE — DISCHARGE NOTE PROVIDER - NSDCMRMEDTOKEN_GEN_ALL_CORE_FT
Depakote  mg oral tablet, extended release: 2 tab(s) orally 2 times a day  lisinopril 10 mg oral tablet: 1 tab(s) orally once a day  simvastatin 20 mg oral tablet: 1 tab(s) orally once a day (at bedtime)   Depakote  mg oral tablet, extended release: 2 tab(s) orally 2 times a day  lisinopril 10 mg oral tablet: 1 tab(s) orally once a day  Physical Therapy: Physical Therapy  simvastatin 20 mg oral tablet: 1 tab(s) orally once a day (at bedtime)

## 2022-10-07 NOTE — DISCHARGE NOTE PROVIDER - HOSPITAL COURSE
Discharge Summary     Admission diagnoses:   productive cough, generalized weakness    Discharge diagnoses:   hMPV, multifocal PNA, RUL nodule, falls, thrombocytopenia    Hospital Course:   For full details, please see H&P, progress notes, consult notes and ancillary notes. Briefly, HPI:  78 y/o Male, with a PmHx of HTN, HLD, focal seizures, presented with a productive cough and generalized weakness. Reporting a productive cough with an onset of a week ago. He describes it as "a shots" worth of white color sputum denies seeing blood tinged sputum. He is bringing up sputum every few hours. Warm water has given minor relief, no other alleviating factors. Along with the productive cough he reports feeling generalized weakness for the past 2 days. The generalized weakness has caused 2 falls, the first one occurred in the kitchen, he feel forward from a standing position hitting his head on the kitchen cabinet. The second fall was in his bed room, he was walking from his bed to the closet and fell forward using the closet door for support. Denies hitting the ground or any injuries from the second fall. The weakness is consistent through out the day and made worse due to the fact that he has had a decreased PO intake this week. He states that he has lost his taste sensation and has not had a desire to eat. He otherwise denies LOC, numbness/tingling, dizziness, visual changes, chest pain, palpitations SOB, orthopnea, peripheral edema, abdominal tenderness, melena, hematochezia, urinary incontinence/hesitancy/frequency, dysuria, saddle anesthesia, n/v/d/c, dizziness. He has voided, had BM and passed gas since the falls.    In the ED he complains of being chilly found to have a fever of 103.3 and hMPV positive. (04 Oct 2022 08:59)  .     The patient's hospital course will be summarized in a problem based format.    #hMPV and multifocal PNA  Patient presented with a productive cough and generalized weakness x2 days with decreased PO intake x1 week. In the ED, patient was found to be hMPV positive on RVP. CT chest was performed showing new findings in the RUL and bilateral lower lobes concerning for infection v aspiration. Patient was started on empiric azithromycin and ceftriaxone for empiric community acquired pneumonia covered. Urine legionella was negative and the azithromycin was discontinued. Urine culture and blood culture were negative. Patient's cough, shortness of breath, and sputum production all improved with treatment. Patient was weaned off 2L NC to room air. Patient was discharged home on cefpodoxime to complete a 5 day course of anitbiotics.     #RUL nodule  CT chest was showing a new 1.4 cm RUL nodule. Patient should have a repeat CT chest 6 weeks after treatment completion to assess for resolution of the nodule.     #Thrombocytopenia  On admission, patient found to have a platelet count of 50. Chart review shows prior platelets counts possibly downtrending overtime, 129 in April 2022 and between 155-225 in April 2020. Heme/onc was consulted. Coags WNL and fibrinogen, haptoglobin, and LDH were all elevated, making TMA/hemolysis and DIC less likely. Peripheral smear 10/7 showing no schistocytosis or platelet clumping. Etiology of thrombocytopenia likely multifactorial due to medication/abx exposure and active viral infection causing marrow suppression. Patient should follow up with his PCP outpatient for a repeat CBC. Possible follow up with heme/onc.     On day of discharge, patient is clinically stable with no new exam findings or acute symptoms compared to prior. The patient was seen by the attending physician on the date of discharge and deemed stable and acceptable for discharge. The patient's chronic medical conditions were treated accordingly per the patient's home medication regimen. The patient's medication reconciliation (with changes made to chronic medications), follow up appointments, discharge orders, instructions, and significant lab and diagnostic studies are as noted.     Discharge follow up action items:     1. Follow up with PCP in 1-2 weeks.   2. Follow up labs ***  3. Medication changes ***  4. On hold medications ***  5. Incidental findings    Patient's ordered code status: ***    Patient disposition: ***      Discharge Summary     Admission diagnoses:   productive cough, generalized weakness    Discharge diagnoses:   hMPV, multifocal PNA, RUL nodule, falls, thrombocytopenia    Hospital Course:   For full details, please see H&P, progress notes, consult notes and ancillary notes. Briefly, HPI:  76 y/o Male, with a PmHx of HTN, HLD, focal seizures, presented with a productive cough and generalized weakness. Reporting a productive cough with an onset of a week ago. He describes it as "a shots" worth of white color sputum denies seeing blood tinged sputum. He is bringing up sputum every few hours. Warm water has given minor relief, no other alleviating factors. Along with the productive cough he reports feeling generalized weakness for the past 2 days. The generalized weakness has caused 2 falls, the first one occurred in the kitchen, he feel forward from a standing position hitting his head on the kitchen cabinet. The second fall was in his bed room, he was walking from his bed to the closet and fell forward using the closet door for support. Denies hitting the ground or any injuries from the second fall. The weakness is consistent through out the day and made worse due to the fact that he has had a decreased PO intake this week. He states that he has lost his taste sensation and has not had a desire to eat. He otherwise denies LOC, numbness/tingling, dizziness, visual changes, chest pain, palpitations SOB, orthopnea, peripheral edema, abdominal tenderness, melena, hematochezia, urinary incontinence/hesitancy/frequency, dysuria, saddle anesthesia, n/v/d/c, dizziness. He has voided, had BM and passed gas since the falls.    In the ED he complains of being chilly found to have a fever of 103.3 and hMPV positive. (04 Oct 2022 08:59)    The patient's hospital course will be summarized in a problem based format.    #hMPV and multifocal PNA  Patient presented with a productive cough and generalized weakness x2 days with decreased PO intake x1 week. In the ED, patient was found to be hMPV positive on RVP. CT chest was performed showing new findings in the RUL and bilateral lower lobes concerning for infection v aspiration. Patient was started on empiric azithromycin and ceftriaxone for empiric community acquired pneumonia covered. Urine legionella was negative and the azithromycin was discontinued. Urine culture and blood culture were negative. Patient's cough, shortness of breath, and sputum production all improved with treatment. Patient was weaned off 2L NC to room air. Patient was discharged home on cefpodoxime to complete a 5 day course of anitbiotics.     #RUL nodule  CT chest was showing a new 1.4 cm RUL nodule. Patient should have a repeat CT chest 6 weeks after treatment completion to assess for resolution of the nodule.     #Thrombocytopenia  On admission, patient found to have a platelet count of 50. Chart review shows prior platelets counts possibly downtrending overtime, 129 in April 2022 and between 155-225 in April 2020. Heme/onc was consulted. Coags WNL and fibrinogen, haptoglobin, and LDH were all elevated, making TMA/hemolysis and DIC less likely. Peripheral smear 10/7 showing no schistocytosis or platelet clumping. Etiology of thrombocytopenia likely multifactorial due to medication/abx exposure and active viral infection causing marrow suppression. Hepatitis panel was ___. Patient should follow up with his PCP outpatient for a repeat CBC. Possible follow up with heme/onc.     On day of discharge, patient is clinically stable with no new exam findings or acute symptoms compared to prior. The patient was seen by the attending physician on the date of discharge and deemed stable and acceptable for discharge. The patient's chronic medical conditions were treated accordingly per the patient's home medication regimen. The patient's medication reconciliation (with changes made to chronic medications), follow up appointments, discharge orders, instructions, and significant lab and diagnostic studies are as noted.     Discharge follow up action items:     1. Follow up with PCP in 1-2 weeks.   2. Follow up labs: repeat CBC within 1-2 weeks  3. Medication changes ***  4. On hold medications: none  5. Incidental findings: none    Patient's ordered code status: full code    Patient disposition: home with outpatient PT      Discharge Summary     Admission diagnoses:   productive cough, generalized weakness    Discharge diagnoses:   hMPV, multifocal PNA, RUL nodule, falls, thrombocytopenia    Hospital Course:   For full details, please see H&P, progress notes, consult notes and ancillary notes. Briefly, HPI:  78 y/o Male, with a PmHx of HTN, HLD, focal seizures, presented with a productive cough and generalized weakness. Reporting a productive cough with an onset of a week ago. He describes it as "a shots" worth of white color sputum denies seeing blood tinged sputum. He is bringing up sputum every few hours. Warm water has given minor relief, no other alleviating factors. Along with the productive cough he reports feeling generalized weakness for the past 2 days. The generalized weakness has caused 2 falls, the first one occurred in the kitchen, he feel forward from a standing position hitting his head on the kitchen cabinet. The second fall was in his bed room, he was walking from his bed to the closet and fell forward using the closet door for support. Denies hitting the ground or any injuries from the second fall. The weakness is consistent through out the day and made worse due to the fact that he has had a decreased PO intake this week. He states that he has lost his taste sensation and has not had a desire to eat. He otherwise denies LOC, numbness/tingling, dizziness, visual changes, chest pain, palpitations SOB, orthopnea, peripheral edema, abdominal tenderness, melena, hematochezia, urinary incontinence/hesitancy/frequency, dysuria, saddle anesthesia, n/v/d/c, dizziness. He has voided, had BM and passed gas since the falls.    In the ED he complains of being chilly found to have a fever of 103.3 and hMPV positive. (04 Oct 2022 08:59)    The patient's hospital course will be summarized in a problem based format.    #hMPV and multifocal PNA  Patient presented with a productive cough and generalized weakness x2 days with decreased PO intake x1 week. In the ED, patient was found to be hMPV positive on RVP. CT chest was performed showing new findings in the RUL and bilateral lower lobes concerning for infection v aspiration. Patient was started on empiric azithromycin and ceftriaxone for empiric community acquired pneumonia covered. Urine legionella was negative and the azithromycin was discontinued. Urine culture and blood culture were negative. Patient's cough, shortness of breath, and sputum production all improved with treatment. Patient was weaned off 2L NC to room air. Patient was discharged home on cefpodoxime to complete a 5 day course of anitbiotics.     #RUL nodule  CT chest was showing a new 1.4 cm RUL nodule. Patient should have a repeat CT chest 6 weeks after treatment completion to assess for resolution of the nodule.     #Thrombocytopenia  On admission, patient found to have a platelet count of 50. Chart review shows prior platelets counts possibly downtrending overtime, 129 in April 2022 and between 155-225 in April 2020. Heme/onc was consulted. Coags WNL and fibrinogen, haptoglobin, and LDH were all elevated, making TMA/hemolysis and DIC less likely. Peripheral smear 10/7 showing no schistocytosis or platelet clumping. Etiology of thrombocytopenia likely multifactorial due to medication/abx exposure and active viral infection causing marrow suppression. Patient should follow up with his PCP outpatient for a repeat CBC. Possible follow up with heme/onc.     On day of discharge, patient is clinically stable with no new exam findings or acute symptoms compared to prior. The patient was seen by the attending physician on the date of discharge and deemed stable and acceptable for discharge. The patient's chronic medical conditions were treated accordingly per the patient's home medication regimen. The patient's medication reconciliation (with changes made to chronic medications), follow up appointments, discharge orders, instructions, and significant lab and diagnostic studies are as noted.     Discharge follow up action items:     1. Follow up with PCP in 1-2 weeks.   2. Follow up labs: repeat CBC within 1-2 weeks  4. On hold medications: none  5. Incidental findings: CT chest showed New 1.4 cm right upper lobe nodule likely related to infection process however a repeat chest CT scan is recommended by radiology in 6 weeks following treatment to assess for resolution.    Patient's ordered code status: full code    Patient disposition: home with outpatient PT

## 2022-10-07 NOTE — DISCHARGE NOTE PROVIDER - NSDCFUSCHEDAPPT_GEN_ALL_CORE_FT
Panfilo Mariano  [facility]  NEUROLOGY [address], [place]  Scheduled Appointment: 12/09/2022

## 2022-10-07 NOTE — DISCHARGE NOTE PROVIDER - NSDCCPCAREPLAN_GEN_ALL_CORE_FT
PRINCIPAL DISCHARGE DIAGNOSIS  Diagnosis: Pneumonia due to human metapneumovirus  Assessment and Plan of Treatment: You were found on admission to have human metapneumovirus. This virus likely explains your cold like symptoms and decreased appetite for the past week. You were additionally found to have signs concerning for a superimposed bacterial pneumonia on Ct chest. You were treated with antibiotics including azithromycin and ceftriaxone. Your symptoms continued to improved and you were weaned off of oxygen support. You should continue taking cefpodoxime after discharge to complete a 5 day course of antibiotics. You should follow up with your PCP within 1 week after discharge.      SECONDARY DISCHARGE DIAGNOSES  Diagnosis: Thrombocytopenia  Assessment and Plan of Treatment: You were found to have low platelet counts during your stay. This can be concerning as it can lead to easy bruising and bleeding. You were evaluated by hematology during your hospital stay. Your low platelet count was likely multifactorial and related to the viral illness as well as the antibiotics you received. You should follow up with PCP outpatient and have repeat bloodowrk within 1-2 weeks to check your platelet levels. If you experience any excessive bleeding or easy bruising please reach out to your PCP or return to the hospital depending on your severity.    Diagnosis: Lung nodule  Assessment and Plan of Treatment: You were found to have a new 1.4 cm right upper lobe lung nodule of unknown significance. This was likely related to your infection, though you should have a repeat CT chest in 6 weeks to further assess the nodule. Please reach out to your PCP to schedule this appointment.

## 2022-10-07 NOTE — PROGRESS NOTE ADULT - PROBLEM SELECTOR PLAN 2
Patient reports 2 falls prior to arrival.   CT head without bleeding or infarct and CT C-Spine without fracture.     -F/u Orthostatics   -Fall risk protocols  -PT consult Patient reports 2 falls prior to arrival.   CT head without bleeding or infarct and CT C-Spine without fracture.   -F/u Orthostatics   -Fall risk protocols  -PT consult

## 2022-10-07 NOTE — DISCHARGE NOTE PROVIDER - CARE PROVIDER_API CALL
Gonzalo Martin (MD)  Cardiovascular Disease; Internal Medicine  [address] [place] [address]  Phone: [phone]  Fax: [phone]  Follow Up Time: 1 week

## 2022-10-07 NOTE — CONSULT NOTE ADULT - ASSESSMENT
76 yo M with a h/o focal seizures who p/w generalized weakness and falls and found to be hMPV positive. Hematology consulted for thrombocytopenia.    #Thrombocytopenia  -Differential diagnosis of thrombocytopenia includes, but is not limited to,  medication induced vs. infection vs. TMA vs. DIC vs. ITP vs. reactive  -Medications reviewed: Patient received zosyn, vancomycin, azithromycin and ceftriaxone, all of which can cause thrombocytopenia  -Please check Hep B/C and HIV to r/o infection  -Coags and fibrinogen WNL making DIC less likely  -Hapto elevated >300,  making hemolysis/TMA less likely  -Peripheral smear will be reviewed  -Thrombocytopenia is likely multifactorial due to medication/abx exposure and active viral infection causing marrow suppression. Would continue to trend CBC daily  -Transfuse for platelets  <10k ( or <15k if febrile or <50k if non-CNS bleeding)    Anai Harp MD  Hematology/Oncology Fellow, PGY-6  Pager: [phone]  After 5pm and on weekends please page on-call fellow   78 yo M with a h/o focal seizures who p/w generalized weakness and falls and found to be hMPV positive. Hematology consulted for thrombocytopenia.    #Thrombocytopenia  -Differential diagnosis of thrombocytopenia includes, but is not limited to,  medication induced vs. infection vs. TMA vs. DIC vs. ITP vs. reactive  -Medications reviewed: Patient received zosyn, vancomycin, azithromycin and ceftriaxone, all of which can cause thrombocytopenia  -Please check Hep B/C and HIV to r/o infection  -Coags and fibrinogen WNL making DIC less likely  -Hapto elevated >300,  making hemolysis/TMA less likely  -Peripheral smear reviewed 10/7: no schistocytosis, no platelet clumping, true thrombocytopenia  -Thrombocytopenia is likely multifactorial due to medication/abx exposure and active viral infection causing marrow suppression. Would continue to trend CBC daily  -Transfuse for platelets  <10k ( or <15k if febrile or <50k if non-CNS bleeding)    Anai Harp MD  Hematology/Oncology Fellow, PGY-6  Pager: [phone]  After 5pm and on weekends please page on-call fellow   78 yo M with a h/o focal seizures who p/w generalized weakness and falls and found to be hMPV positive. Hematology consulted for thrombocytopenia.    #Thrombocytopenia  -Differential diagnosis of thrombocytopenia includes, but is not limited to,  medication induced vs. infection vs. TMA vs. DIC vs. ITP vs. reactive  -Medications reviewed: Patient received zosyn, vancomycin, azithromycin and ceftriaxone, all of which can cause thrombocytopenia  -Please check Hep B/C and HIV to r/o infection  -Coags and fibrinogen WNL making DIC less likely  -Hapto elevated >300,  making hemolysis/TMA less likely  -Peripheral smear reviewed 10/7: no schistocytosis, no platelet clumping, true thrombocytopenia  -Thrombocytopenia likely multifactorial in setting of acute viral infection and with exposure to multiple antibiotics; Would continue to trend CBC daily  -Transfuse for platelets  <10k ( or <15k if febrile or <50k if non-CNS bleeding)    Anai Harp MD  Hematology/Oncology Fellow, PGY-6  Pager: [phone]  After 5pm and on weekends please page on-call fellow

## 2022-10-07 NOTE — PROGRESS NOTE ADULT - SUBJECTIVE AND OBJECTIVE BOX
***************************************************************  Rex Funes, PGY1  Internal Medicine   TEAMS Preferred  [facility] Pager [phone]  [facility] Pager [phone]  ***************************************************************    MICHAEL RODRIGUEZ  77y  MRN: [id number]  10-04-22 (3d)    Patient is a 77y old  Male who presents with a chief complaint of Productive cough and Generalized weakness (06 Oct 2022 07:19)      SUBJECTIVE / OVERNIGHT EVENTS:   No acute overnight events. Pt seen and examined at bedside. Denies fevers, chills, CP, SOB, Abdominal pain, N/V, Constipation, Diarrhea. Last BM     12 Point ROS negative with the exception of the above    MEDICATIONS  (STANDING):  azithromycin  IVPB 500 milliGRAM(s) IV Intermittent every 24 hours  azithromycin  IVPB      benzonatate 100 milliGRAM(s) Oral three times a day  cefTRIAXone   IVPB 1000 milliGRAM(s) IV Intermittent every 24 hours  diVALproex  milliGRAM(s) Oral <User Schedule>  lisinopril 10 milliGRAM(s) Oral daily  simvastatin 20 milliGRAM(s) Oral at bedtime    MEDICATIONS  (PRN):  acetaminophen     Tablet .. 650 milliGRAM(s) Oral every 6 hours PRN Temp greater or equal to 38C (100.4F), Mild Pain (1 - 3)  melatonin 3 milliGRAM(s) Oral at bedtime PRN Insomnia      OBJECTIVE:  Vital Signs Last 24 Hrs  T(C): 36.8 (07 Oct 2022 04:57), Max: 36.9 (06 Oct 2022 22:28)  T(F): 98.2 (07 Oct 2022 04:57), Max: 98.5 (06 Oct 2022 22:28)  HR: 68 (07 Oct 2022 04:57) (68 - 72)  BP: 125/89 (07 Oct 2022 04:57) (99/66 - 125/89)  BP(mean): --  RR: 17 (07 Oct 2022 04:57) (17 - 18)  SpO2: 97% (07 Oct 2022 04:57) (95% - 97%)    Parameters below as of 07 Oct 2022 04:57  Patient On (Oxygen Delivery Method): room air        I&O's Summary      PHYSICAL EXAM:  GENERAL: Laying comfortably, NAD  HEENT: NCAT, PERRLA, EOMI, no scleral icterus, no LAD  NECK: No JVD  LUNG: CTABL; No wheezes, crackles, or rhonchi  HEART: RRR; normal S1/S2; No murmurs, rubs, or gallops  ABDOMEN: +BS, soft, nontender, nondistended, no HSM; No rebound, guarding, or rigidity  EXTREMITIES:  No LE edema b/l, 2+ Peripheral Pulses, No clubbing or cyanosis  NEUROLOGY: AOx3, non-focal, strength 5/5 in all extremities, sensation intact  PSYCH: calm and cooperative  SKIN: No rashes or lesions    LABS:                        13.2   8.15  )-----------( 46       ( 06 Oct 2022 06:32 )             41.1     Auto Eosinophil # x     / Auto Eosinophil % x     / Auto Neutrophil # x     / Auto Neutrophil % x     / BANDS % x        10-06    134<L>  |  100  |  18  ----------------------------<  106<H>  4.2   |  23  |  0.96  10-05    132<L>  |  99  |  19  ----------------------------<  100<H>  4.3   |  19<L>  |  1.14  10-04    130<L>  |  97<L>  |  22  ----------------------------<  120<H>  4.4   |  21<L>  |  1.14    Ca    8.8      06 Oct 2022 06:32  Ca    8.5      05 Oct 2022 06:00  Ca    8.8      04 Oct 2022 09:55  Phos  3.1     10-06  Mg     2.20     10-06                  CAPILLARY BLOOD GLUCOSE            RADIOLOGY & ADDITIONAL TESTS:     ***************************************************************  Rex Funes, PGY1  Internal Medicine   TEAMS Preferred  [facility] Pager [phone]  [facility] Pager [phone]  ***************************************************************    MICHAEL RODRIGUEZ  77y  MRN: [id number]  10-04-22 (3d)    Patient is a 77y old  Male who presents with a chief complaint of Productive cough and Generalized weakness (06 Oct 2022 07:19)      SUBJECTIVE / OVERNIGHT EVENTS:   No acute overnight events. Pt seen and examined at bedside. Patient reports continued cough with improved shortness of breath and mucous production. Denies fevers, chills, and headache. Able to walk to bathroom without issue.      12 Point ROS negative with the exception of the above    MEDICATIONS  (STANDING):  azithromycin  IVPB 500 milliGRAM(s) IV Intermittent every 24 hours  azithromycin  IVPB      benzonatate 100 milliGRAM(s) Oral three times a day  cefTRIAXone   IVPB 1000 milliGRAM(s) IV Intermittent every 24 hours  diVALproex  milliGRAM(s) Oral <User Schedule>  lisinopril 10 milliGRAM(s) Oral daily  simvastatin 20 milliGRAM(s) Oral at bedtime    MEDICATIONS  (PRN):  acetaminophen     Tablet .. 650 milliGRAM(s) Oral every 6 hours PRN Temp greater or equal to 38C (100.4F), Mild Pain (1 - 3)  melatonin 3 milliGRAM(s) Oral at bedtime PRN Insomnia      OBJECTIVE:  Vital Signs Last 24 Hrs  T(C): 36.8 (07 Oct 2022 04:57), Max: 36.9 (06 Oct 2022 22:28)  T(F): 98.2 (07 Oct 2022 04:57), Max: 98.5 (06 Oct 2022 22:28)  HR: 68 (07 Oct 2022 04:57) (68 - 72)  BP: 125/89 (07 Oct 2022 04:57) (99/66 - 125/89)  BP(mean): --  RR: 17 (07 Oct 2022 04:57) (17 - 18)  SpO2: 97% (07 Oct 2022 04:57) (95% - 97%)    Parameters below as of 07 Oct 2022 04:57  Patient On (Oxygen Delivery Method): room air        I&O's Summary      PHYSICAL EXAM:  GENERAL: Laying comfortably, NAD  HEENT: NCAT, PERRLA, EOMI, no scleral icterus  LUNG: course lung sounds bilaterally that clear with cough  HEART: RRR; normal S1/S2; No murmurs, rubs, or gallops  ABDOMEN: +BS, soft, nontender, nondistended, no HSM; No rebound, guarding, or rigidity  EXTREMITIES:  No LE edema b/l, 2+ Peripheral Pulses, No clubbing or cyanosis  NEUROLOGY: AOx3, non-focal, strength 5/5 in all extremities, sensation intact  PSYCH: calm and cooperative  SKIN: No rashes or lesions    LABS:                        13.5   6.51  )-----------( 54       ( 07 Oct 2022 06:42 )             41.7                         13.2   8.15  )-----------( 46       ( 06 Oct 2022 06:32 )             41.1     Auto Eosinophil # x     / Auto Eosinophil % x     / Auto Neutrophil # x     / Auto Neutrophil % x     / BANDS % x      10-07    135  |  102  |  18  ----------------------------<  115<H>  4.9   |  19<L>  |  0.94    Ca    9.0      07 Oct 2022 06:42  Phos  3.5     10-07  Mg     2.20     10-07      10-06    134<L>  |  100  |  18  ----------------------------<  106<H>  4.2   |  23  |  0.96  10-05    132<L>  |  99  |  19  ----------------------------<  100<H>  4.3   |  19<L>  |  1.14  10-04    130<L>  |  97<L>  |  22  ----------------------------<  120<H>  4.4   |  21<L>  |  1.14    Ca    8.8      06 Oct 2022 06:32  Ca    8.5      05 Oct 2022 06:00  Ca    8.8      04 Oct 2022 09:55  Phos  3.1     10-06  Mg     2.20     10-06

## 2022-10-07 NOTE — DISCHARGE NOTE PROVIDER - NSDCCPTREATMENT_GEN_ALL_CORE_FT
PRINCIPAL PROCEDURE  Procedure: CT chest wo contrast  Findings and Treatment: IMPRESSION:  1.  New findings in the right upper lobe and both lower lobes favored to   be related to infection or aspiration. New 1.4 cm right upper lobe nodule   is likely related to this process however a repeat chest CT scan is   recommended in 6 weeks following treatment to assess for resolution.  2.  Hiatal hernia

## 2022-10-07 NOTE — DIETITIAN INITIAL EVALUATION ADULT - OTHER INFO
77 year old male, with a prior medical history of HTN, HLD, focal seizures, presented with a productive cough and generalized weakness and falls found to be hMVP positive. Per chart.     Patient reports improved appetite, noted consuming >75% of breakfast per observation. Patient reports normally skipping lunch and was amenable to nutritional supplement. No chewing/swallowing difficulties reported. No nausea , diarrhea, vomiting, constipation reported. Food preferences were obtained from patient and assistance was given in filling out cafeteria menu. Patient reports NKFA (no known food allergies) and has reported no recent weight change but did report he normally weighs about 81.8 kg, (180 lbs). Current weight is 182.1 lbs (10/5/22) per chart. No edema or pressure injuries noted per RN flow sheet. Pt has no nutrition related questions at this time and was encouraged to continue PO intake and supplement in between meals. Education was given of DASH diet and incorporating more protein, less sodium into diet and incorporating shakes in between meals for extra protein and calories since patient normally skips lunch.

## 2022-10-07 NOTE — CONSULT NOTE ADULT - SUBJECTIVE AND OBJECTIVE BOX
HPI:  78 y/o Male, with a PmHx of HTN, HLD, focal seizures, presented with a productive cough and generalized weakness. Reporting a productive cough with an onset of a week ago. He describes it as "a shots" worth of white color sputum denies seeing blood tinged sputum. He is bringing up sputum every few hours. Warm water has given minor relief, no other alleviating factors. Along with the productive cough he reports feeling generalized weakness for the past 2 days. The generalized weakness has caused 2 falls, the first one occurred in the kitchen, he feel forward from a standing position hitting his head on the kitchen cabinet. The second fall was in his bed room, he was walking from his bed to the closet and fell forward using the closet door for support. Denies hitting the ground or any injuries from the second fall. The weakness is consistent through out the day and made worse due to the fact that he has had a decreased PO intake this week. He states that he has lost his taste sensation and has not had a desire to eat. He otherwise denies LOC, numbness/tingling, dizziness, visual changes, chest pain, palpitations SOB, orthopnea, peripheral edema, abdominal tenderness, melena, hematochezia, urinary incontinence/hesitancy/frequency, dysuria, saddle anesthesia, n/v/d/c, dizziness. He has voided, had BM and passed gas since the falls.    In the ED he complains of being chilly found to have a fever of 103.3 and hMPV positive.     Hematology has been consulted for thrombocytopenia. On admission, PLT 50. Most recent lab work from 2020 shows normal platelet count ranging form 129-200's. No anemia or leukopenia.       PAST MEDICAL & SURGICAL HISTORY:  Hypertension      Hyperlipidemia      Focal epilepsy      S/P left rotator cuff repair      S/P right rotator cuff repair          Allergies    No Known Allergies    Intolerances        MEDICATIONS  (STANDING):  benzonatate 100 milliGRAM(s) Oral three times a day  cefTRIAXone   IVPB 1000 milliGRAM(s) IV Intermittent every 24 hours  diVALproex  milliGRAM(s) Oral <User Schedule>  lisinopril 10 milliGRAM(s) Oral daily  simvastatin 20 milliGRAM(s) Oral at bedtime    MEDICATIONS  (PRN):  acetaminophen     Tablet .. 650 milliGRAM(s) Oral every 6 hours PRN Temp greater or equal to 38C (100.4F), Mild Pain (1 - 3)  melatonin 3 milliGRAM(s) Oral at bedtime PRN Insomnia      FAMILY HISTORY:  FHx: lung cancer  Father due to tobacco use        SOCIAL HISTORY: No EtOH, no tobacco    REVIEW OF SYSTEMS:    CONSTITUTIONAL: No weakness, fevers or chills  EYES/ENT: No visual changes;  No vertigo or throat pain   NECK: No pain or stiffness  RESPIRATORY: No cough, wheezing, hemoptysis; No shortness of breath  CARDIOVASCULAR: No chest pain or palpitations  GASTROINTESTINAL: No abdominal or epigastric pain. No nausea, vomiting, or hematemesis; No diarrhea or constipation. No melena or hematochezia.  GENITOURINARY: No dysuria, frequency or hematuria  NEUROLOGICAL: No numbness or weakness  SKIN: No itching, burning, rashes, or lesions   All other review of systems is negative unless indicated above.        T(F): 98.2 (10-07-22 @ 04:57), Max: 98.5 (10-06-22 @ 22:28)  HR: 68 (10-07-22 @ 04:57)  BP: 125/89 (10-07-22 @ 04:57)  RR: 17 (10-07-22 @ 04:57)  SpO2: 97% (10-07-22 @ 04:57)  Wt(kg): --    GENERAL: NAD, well-developed  HEAD:  Atraumatic, Normocephalic  EYES: EOMI, PERRLA, conjunctiva and sclera clear  NECK: Supple, No JVD  CHEST/LUNG: Clear to auscultation bilaterally; No wheeze  HEART: Regular rate and rhythm; No murmurs, rubs, or gallops  ABDOMEN: Soft, Nontender, Nondistended; Bowel sounds present  EXTREMITIES:  2+ Peripheral Pulses, No clubbing, cyanosis, or edema  NEUROLOGY: non-focal  SKIN: No rashes or lesions                          13.5   6.51  )-----------( 54       ( 07 Oct 2022 06:42 )             41.7       10-07    135  |  102  |  18  ----------------------------<  115<H>  4.9   |  19<L>  |  0.94    Ca    9.0      07 Oct 2022 06:42  Phos  3.5     10-07  Mg     2.20     10-07        Phosphorus Level, Serum: 3.5 mg/dL (10-07 @ 06:42)  Magnesium, Serum: 2.20 mg/dL (10-07 @ 06:42)  Lactate Dehydrogenase, Serum: 324 U/L (10-07 @ 06:42)      PT/INR - ( 07 Oct 2022 06:42 )   PT: 12.7 sec;   INR: 1.09 ratio         PTT - ( 07 Oct 2022 06:42 )  PTT:24.8 sec    .Blood Blood-Peripheral  10-03 @ 21:25   No growth to date.  --  --      .Blood Blood-Peripheral  10-03 @ 21:15   No growth to date.  --  --       HPI:  76 y/o Male, with a PmHx of HTN, HLD, focal seizures, presented with a productive cough and generalized weakness. Reporting a productive cough with an onset of a week ago. He describes it as "a shots" worth of white color sputum denies seeing blood tinged sputum. He is bringing up sputum every few hours. Warm water has given minor relief, no other alleviating factors. Along with the productive cough he reports feeling generalized weakness for the past 2 days. The generalized weakness has caused 2 falls, the first one occurred in the kitchen, he feel forward from a standing position hitting his head on the kitchen cabinet. The second fall was in his bed room, he was walking from his bed to the closet and fell forward using the closet door for support. Denies hitting the ground or any injuries from the second fall. The weakness is consistent through out the day and made worse due to the fact that he has had a decreased PO intake this week. He states that he has lost his taste sensation and has not had a desire to eat. He otherwise denies LOC, numbness/tingling, dizziness, visual changes, chest pain, palpitations SOB, orthopnea, peripheral edema, abdominal tenderness, melena, hematochezia, urinary incontinence/hesitancy/frequency, dysuria, saddle anesthesia, n/v/d/c, dizziness. He has voided, had BM and passed gas since the falls.    In the ED he complains of being chilly found to have a fever of 103.3 and hMPV positive.     Hematology has been consulted for thrombocytopenia. On admission, PLT 50. Most recent lab work from 2020 shows normal platelet count ranging form 129-200's. No anemia or leukopenia.       PAST MEDICAL & SURGICAL HISTORY:  Hypertension      Hyperlipidemia      Focal epilepsy      S/P left rotator cuff repair      S/P right rotator cuff repair          Allergies    No Known Allergies    Intolerances        MEDICATIONS  (STANDING):  benzonatate 100 milliGRAM(s) Oral three times a day  cefTRIAXone   IVPB 1000 milliGRAM(s) IV Intermittent every 24 hours  diVALproex  milliGRAM(s) Oral <User Schedule>  lisinopril 10 milliGRAM(s) Oral daily  simvastatin 20 milliGRAM(s) Oral at bedtime    MEDICATIONS  (PRN):  acetaminophen     Tablet .. 650 milliGRAM(s) Oral every 6 hours PRN Temp greater or equal to 38C (100.4F), Mild Pain (1 - 3)  melatonin 3 milliGRAM(s) Oral at bedtime PRN Insomnia      FAMILY HISTORY:  FHx: lung cancer  Father due to tobacco use        SOCIAL HISTORY: No EtOH, no tobacco    REVIEW OF SYSTEMS:  12 point  review of systems is negative unless indicated above.        T(F): 98.2 (10-07-22 @ 04:57), Max: 98.5 (10-06-22 @ 22:28)  HR: 68 (10-07-22 @ 04:57)  BP: 125/89 (10-07-22 @ 04:57)  RR: 17 (10-07-22 @ 04:57)  SpO2: 97% (10-07-22 @ 04:57)  Wt(kg): --    Physical exam deferred to reduce exposure to HMPV                          13.5   6.51  )-----------( 54       ( 07 Oct 2022 06:42 )             41.7       10-07    135  |  102  |  18  ----------------------------<  115<H>  4.9   |  19<L>  |  0.94    Ca    9.0      07 Oct 2022 06:42  Phos  3.5     10-07  Mg     2.20     10-07        Phosphorus Level, Serum: 3.5 mg/dL (10-07 @ 06:42)  Magnesium, Serum: 2.20 mg/dL (10-07 @ 06:42)  Lactate Dehydrogenase, Serum: 324 U/L (10-07 @ 06:42)      PT/INR - ( 07 Oct 2022 06:42 )   PT: 12.7 sec;   INR: 1.09 ratio         PTT - ( 07 Oct 2022 06:42 )  PTT:24.8 sec    .Blood Blood-Peripheral  10-03 @ 21:25   No growth to date.  --  --      .Blood Blood-Peripheral  10-03 @ 21:15   No growth to date.  --  --

## 2022-10-07 NOTE — DIETITIAN INITIAL EVALUATION ADULT - PERTINENT LABORATORY DATA
10-07    135  |  102  |  18  ----------------------------<  115<H>  4.9   |  19<L>  |  0.94    Ca    9.0      07 Oct 2022 06:42  Phos  3.5     10-07  Mg     2.20     10-07    A1C with Estimated Average Glucose Result: 6.2 % (10-05-22 @ 06:00)

## 2022-10-07 NOTE — CONSULT NOTE ADULT - ATTENDING COMMENTS
70 yo male admitted with fever and weakness and found to have hMPV  - exam as per primary team  - thrombocytopenia likely multifactorial in setting of acute viral infection and exposure to antibiotics  - HIV and hepC to complete work-up  - no laboratory or peripheral smear evidence to suggest hemolysis or microangiopathy  - transfuse to maintain platelet count >10K or if febrile >15K  - hold anticoagulants or antiplatelet agents if platelet count drops to <50K

## 2022-10-07 NOTE — DIETITIAN INITIAL EVALUATION ADULT - NS FNS DIET ORDER
Diet, Regular:   DASH/TLC {Sodium & Cholesterol Restricted} (DASH)   - Recommend for patient to supplement with Ensure Plus HP (350 kcal, 20g protein) in between meals.  - Recommend for patient to have a banana added to his B/L meals          Diet, Regular:   DASH/TLC {Sodium & Cholesterol Restricted} (DASH)

## 2022-10-07 NOTE — DIETITIAN INITIAL EVALUATION ADULT - NUTRITIONGOAL OUTCOME1
- Patient to increase protein and calorie intake to at least 1342 kcal a day.   - Patient to incorporate consumption of shakes in between meal Teach back 2-3 points from education

## 2022-10-07 NOTE — DIETITIAN INITIAL EVALUATION ADULT - PERTINENT MEDS FT
MEDICATIONS  (STANDING):  benzonatate 100 milliGRAM(s) Oral three times a day  cefTRIAXone   IVPB 1000 milliGRAM(s) IV Intermittent every 24 hours  diVALproex  milliGRAM(s) Oral <User Schedule>  lisinopril 10 milliGRAM(s) Oral daily  simvastatin 20 milliGRAM(s) Oral at bedtime    MEDICATIONS  (PRN):  acetaminophen     Tablet .. 650 milliGRAM(s) Oral every 6 hours PRN Temp greater or equal to 38C (100.4F), Mild Pain (1 - 3)  melatonin 3 milliGRAM(s) Oral at bedtime PRN Insomnia

## 2022-10-07 NOTE — DIETITIAN INITIAL EVALUATION ADULT - EDUCATION DIETARY MODIFICATIONS
(1) partially meets; needs review/practice teach back/(1) partially meets; needs review/practice/verbalization

## 2022-10-07 NOTE — DIETITIAN INITIAL EVALUATION ADULT - ORAL INTAKE PTA/DIET HISTORY
Patient reports no changes in food intake, no chewing/swallowing difficulty, and no nausea, diarrhea, vomiting, constipation. Food preferences were obtained from patient and assistance was given in filling out cafeteria menu. Patient reports NKFA (no known food allergies) and has reported no recent weight change. Pt has no nutrition related questions at this time and was encouraged to continue PO intake and supplement in between meals with Ensure Plus HP (350 kcal, 20g protein). Education was given of DASH diet and incorporating more protein and less sodium into diet and incorporating shakes in between meals for extra protein and calories.  Patient reports no changes in food intake, no chewing/swallowing difficulty, and no nausea, diarrhea, vomiting, constipation. Food preferences were obtained from patient and assistance was given in filling out cafeteria menu. Patient reports NKFA (no known food allergies) and has reported no recent weight change but did report he normally weighs about 83.9 kg, (185 lbs). Pt has no nutrition related questions at this time and was encouraged to continue PO intake and supplement in between meals with Ensure Plus HP (350 kcal, 20g protein). Education was given of DASH diet and incorporating more protein, less sodium into diet and incorporating shakes in between meals for extra protein and calories.  Patient seen for assessment. Patient reported decreased appetite and taste changes for a few days prior to admission in relation to acute illness. Reports monitoring sodium and fat intake at home.

## 2022-10-07 NOTE — PROGRESS NOTE ADULT - PROBLEM SELECTOR PLAN 8
DVT prophylaxis: Due to thrombocytopenia will use SCD.    Abnormal Imaging- CT chest showed New 1.4 cm right upper lobe nodule likely related to infection process however a repeat chest CT scan is recommended by radiology in 6 weeks following treatment to assess for resolution.    Dispo- pending further medical optimization, likely home, f/u PT

## 2022-10-07 NOTE — DISCHARGE NOTE PROVIDER - NSDCFUADDINST_GEN_ALL_CORE_FT
Please follow up with your PCP for repeat Complete Blood Count to evaluate your platelets  Please follow up with your PCP for repeat CT scan of your lung for your lung nodule in 6 months

## 2022-10-07 NOTE — DISCHARGE NOTE PROVIDER - ATTENDING ATTESTATION STATEMENT
I have personally seen and examined the patient. I have collaborated with and supervised the negative...

## 2022-10-07 NOTE — PROGRESS NOTE ADULT - PROBLEM SELECTOR PLAN 4
Likely 2/2 viral infection however possibility of Drug induced throbocytopenia 2/2 depakote?   No current signs or symptoms of bleeding     - Trend platelets . Can obtain plt level in blue top   -depakote levels w/i range   - Monitor levels closely and Transfuse as needed Likely 2/2 viral infection however possibility of Drug induced throbocytopenia 2/2 depakote v azithromycin, vancomcin  No current signs or symptoms of bleeding     - Trend platelets . Can obtain plt level in blue top   -depakote levels w/i range   - Monitor levels closely and Transfuse as needed  - heme/onc consulted  - f/u hep B., hep C, and HIV

## 2022-10-07 NOTE — DIETITIAN INITIAL EVALUATION ADULT - ADD RECOMMEND
- Patient recommended to continue PO diet  - Patient recommended to consume Ensure Plus HP (350 kcal, 20g protein) or banana in between meals  - Patient recommended not to skip meals  - Document PO intake to monitor trend

## 2022-10-07 NOTE — DIETITIAN INITIAL EVALUATION ADULT - REASON INDICATOR FOR ASSESSMENT
Length Of Stay LOS visit.  Patient seen for nutrition assessment.   77 year old male, with a prior medical history of HTN, HLD, focal seizures, presented with a productive cough and generalized weakness and falls found to be hMVP positive. Per flow sheet.    Length of stay

## 2022-10-07 NOTE — PROGRESS NOTE ADULT - PROBLEM SELECTOR PLAN 1
- With possible superimposed bacterial PNA   - CT chest showing New  upper lobe and both lower lobes findings c/f infection or aspiration and new 1.4 cm right upper lobe nodule. However pt denies ever choking or coughing with food  - speech and swallow - no evidence of aspiration during exam  - UA negative  - f/u UCx, BCx, urine legionella  - C/w azithromycin and ceftriaxone for now.  -trend fever curve   -monitor hemodynamics and O2 requirements  - wean oxygen as tolerated - With possible superimposed bacterial PNA   - CT chest showing New  upper lobe and both lower lobes findings c/f infection or aspiration and new 1.4 cm right upper lobe nodule. However pt denies ever choking or coughing with food  - speech and swallow - no evidence of aspiration during exam  - UA negative  - urine legionella neg  - f/u UCx, BCx  - C/w ceftriaxone for now.  -trend fever curve   -monitor hemodynamics and O2 requirements  - wean oxygen as tolerated

## 2022-10-08 ENCOUNTER — TRANSCRIPTION ENCOUNTER (OUTPATIENT)
Age: 77
End: 2022-10-08

## 2022-10-08 VITALS
OXYGEN SATURATION: 100 % | RESPIRATION RATE: 17 BRPM | SYSTOLIC BLOOD PRESSURE: 125 MMHG | DIASTOLIC BLOOD PRESSURE: 81 MMHG | HEART RATE: 69 BPM | TEMPERATURE: 98 F

## 2022-10-08 LAB
ANION GAP SERPL CALC-SCNC: 10 MMOL/L — SIGNIFICANT CHANGE UP (ref 7–14)
APTT BLD: 23.7 SEC — LOW (ref 27–36.3)
BUN SERPL-MCNC: 19 MG/DL — SIGNIFICANT CHANGE UP (ref 7–23)
CALCIUM SERPL-MCNC: 8.9 MG/DL — SIGNIFICANT CHANGE UP (ref 8.4–10.5)
CHLORIDE SERPL-SCNC: 102 MMOL/L — SIGNIFICANT CHANGE UP (ref 98–107)
CO2 SERPL-SCNC: 23 MMOL/L — SIGNIFICANT CHANGE UP (ref 22–31)
CREAT SERPL-MCNC: 0.98 MG/DL — SIGNIFICANT CHANGE UP (ref 0.5–1.3)
EGFR: 80 ML/MIN/1.73M2 — SIGNIFICANT CHANGE UP
GLUCOSE SERPL-MCNC: 106 MG/DL — HIGH (ref 70–99)
HAV IGM SER-ACNC: SIGNIFICANT CHANGE UP
HBV CORE AB SER-ACNC: SIGNIFICANT CHANGE UP
HBV CORE IGM SER-ACNC: SIGNIFICANT CHANGE UP
HBV E AB SER-ACNC: SIGNIFICANT CHANGE UP
HBV E AG SER-ACNC: SIGNIFICANT CHANGE UP
HBV SURFACE AB SER-ACNC: SIGNIFICANT CHANGE UP
HBV SURFACE AG SER-ACNC: SIGNIFICANT CHANGE UP
HCT VFR BLD CALC: 41.6 % — SIGNIFICANT CHANGE UP (ref 39–50)
HCV AB S/CO SERPL IA: 0.06 S/CO — SIGNIFICANT CHANGE UP (ref 0–0.99)
HCV AB SERPL-IMP: SIGNIFICANT CHANGE UP
HGB BLD-MCNC: 13.3 G/DL — SIGNIFICANT CHANGE UP (ref 13–17)
HIV 1+2 AB+HIV1 P24 AG SERPL QL IA: SIGNIFICANT CHANGE UP
INR BLD: 1.09 RATIO — SIGNIFICANT CHANGE UP (ref 0.88–1.16)
MAGNESIUM SERPL-MCNC: 2.2 MG/DL — SIGNIFICANT CHANGE UP (ref 1.6–2.6)
MCHC RBC-ENTMCNC: 29.8 PG — SIGNIFICANT CHANGE UP (ref 27–34)
MCHC RBC-ENTMCNC: 32 GM/DL — SIGNIFICANT CHANGE UP (ref 32–36)
MCV RBC AUTO: 93.3 FL — SIGNIFICANT CHANGE UP (ref 80–100)
NRBC # BLD: 0 /100 WBCS — SIGNIFICANT CHANGE UP (ref 0–0)
NRBC # FLD: 0 K/UL — SIGNIFICANT CHANGE UP (ref 0–0)
PHOSPHATE SERPL-MCNC: 3.4 MG/DL — SIGNIFICANT CHANGE UP (ref 2.5–4.5)
PLATELET # BLD AUTO: 92 K/UL — LOW (ref 150–400)
POTASSIUM SERPL-MCNC: 4.7 MMOL/L — SIGNIFICANT CHANGE UP (ref 3.5–5.3)
POTASSIUM SERPL-SCNC: 4.7 MMOL/L — SIGNIFICANT CHANGE UP (ref 3.5–5.3)
PROTHROM AB SERPL-ACNC: 12.7 SEC — SIGNIFICANT CHANGE UP (ref 10.5–13.4)
RBC # BLD: 4.46 M/UL — SIGNIFICANT CHANGE UP (ref 4.2–5.8)
RBC # FLD: 13.9 % — SIGNIFICANT CHANGE UP (ref 10.3–14.5)
SODIUM SERPL-SCNC: 135 MMOL/L — SIGNIFICANT CHANGE UP (ref 135–145)
WBC # BLD: 5.03 K/UL — SIGNIFICANT CHANGE UP (ref 3.8–10.5)
WBC # FLD AUTO: 5.03 K/UL — SIGNIFICANT CHANGE UP (ref 3.8–10.5)

## 2022-10-08 PROCEDURE — 99239 HOSP IP/OBS DSCHRG MGMT >30: CPT

## 2022-10-08 RX ORDER — CEFTRIAXONE 500 MG/1
1000 INJECTION, POWDER, FOR SOLUTION INTRAMUSCULAR; INTRAVENOUS EVERY 24 HOURS
Refills: 0 | Status: COMPLETED | OUTPATIENT
Start: 2022-10-08 | End: 2022-10-08

## 2022-10-08 RX ADMIN — Medication 100 MILLIGRAM(S): at 06:37

## 2022-10-08 RX ADMIN — CEFTRIAXONE 100 MILLIGRAM(S): 500 INJECTION, POWDER, FOR SOLUTION INTRAMUSCULAR; INTRAVENOUS at 15:22

## 2022-10-08 RX ADMIN — LISINOPRIL 10 MILLIGRAM(S): 2.5 TABLET ORAL at 06:37

## 2022-10-08 RX ADMIN — DIVALPROEX SODIUM 500 MILLIGRAM(S): 500 TABLET, DELAYED RELEASE ORAL at 06:37

## 2022-10-08 RX ADMIN — Medication 100 MILLIGRAM(S): at 15:22

## 2022-10-08 NOTE — DISCHARGE NOTE NURSING/CASE MANAGEMENT/SOCIAL WORK - NSDCPEFALRISK_GEN_ALL_CORE
For information on Fall & Injury Prevention, visit: [email or web address] OR  [email or web address] OR  https://www.cdc.gov/steadi/patient.html

## 2022-10-08 NOTE — PROGRESS NOTE ADULT - REASON FOR ADMISSION
Productive cough and Generalized weakness

## 2022-10-08 NOTE — PROGRESS NOTE ADULT - SUBJECTIVE AND OBJECTIVE BOX
Please let them know the xr neck are ok.   PROGRESS NOTE:   Authored by Ciera Stubbs MD  Pager [phone] [facility] | [phone] [facility]    Patient is a 77y old  Male who presents with a chief complaint of Productive cough and Generalized weakness (07 Oct 2022 16:33)      SUBJECTIVE / OVERNIGHT EVENTS:    MEDICATIONS  (STANDING):  benzonatate 100 milliGRAM(s) Oral three times a day  cefTRIAXone   IVPB 1000 milliGRAM(s) IV Intermittent every 24 hours  diVALproex  milliGRAM(s) Oral <User Schedule>  lisinopril 10 milliGRAM(s) Oral daily  simvastatin 20 milliGRAM(s) Oral at bedtime    MEDICATIONS  (PRN):  acetaminophen     Tablet .. 650 milliGRAM(s) Oral every 6 hours PRN Temp greater or equal to 38C (100.4F), Mild Pain (1 - 3)  melatonin 3 milliGRAM(s) Oral at bedtime PRN Insomnia      I&O's Summary      PHYSICAL EXAM:  Vital Signs Last 24 Hrs  T(C): 36.3 (08 Oct 2022 05:36), Max: 36.7 (07 Oct 2022 13:18)  T(F): 97.4 (08 Oct 2022 05:36), Max: 98 (07 Oct 2022 13:18)  HR: 63 (08 Oct 2022 05:36) (63 - 71)  BP: 117/78 (08 Oct 2022 05:36) (106/72 - 126/87)  BP(mean): --  RR: 17 (08 Oct 2022 05:36) (17 - 17)  SpO2: 100% (08 Oct 2022 05:36) (97% - 100%)    Parameters below as of 08 Oct 2022 05:36  Patient On (Oxygen Delivery Method): room air            LABS:                        13.5   6.51  )-----------( 54       ( 07 Oct 2022 06:42 )             41.7     10-07    135  |  102  |  18  ----------------------------<  115<H>  4.9   |  19<L>  |  0.94    Ca    9.0      07 Oct 2022 06:42  Phos  3.5     10-07  Mg     2.20     10-07      PT/INR - ( 07 Oct 2022 06:42 )   PT: 12.7 sec;   INR: 1.09 ratio         PTT - ( 07 Oct 2022 06:42 )  PTT:24.8 sec           PROGRESS NOTE:   Authored by Ciera Stubbs MD  Pager [phone] [facility] | [phone] [facility]    Patient is a 77y old  Male who presents with a chief complaint of Productive cough and Generalized weakness (07 Oct 2022 16:33)      SUBJECTIVE / OVERNIGHT EVENTS:  Afebrile, NAEON. Cough improved but still some persistent.    MEDICATIONS  (STANDING):  benzonatate 100 milliGRAM(s) Oral three times a day  cefTRIAXone   IVPB 1000 milliGRAM(s) IV Intermittent every 24 hours  diVALproex  milliGRAM(s) Oral <User Schedule>  lisinopril 10 milliGRAM(s) Oral daily  simvastatin 20 milliGRAM(s) Oral at bedtime    MEDICATIONS  (PRN):  acetaminophen     Tablet .. 650 milliGRAM(s) Oral every 6 hours PRN Temp greater or equal to 38C (100.4F), Mild Pain (1 - 3)  melatonin 3 milliGRAM(s) Oral at bedtime PRN Insomnia      I&O's Summary      PHYSICAL EXAM:  Vital Signs Last 24 Hrs  T(C): 36.3 (08 Oct 2022 05:36), Max: 36.7 (07 Oct 2022 13:18)  T(F): 97.4 (08 Oct 2022 05:36), Max: 98 (07 Oct 2022 13:18)  HR: 63 (08 Oct 2022 05:36) (63 - 71)  BP: 117/78 (08 Oct 2022 05:36) (106/72 - 126/87)  BP(mean): --  RR: 17 (08 Oct 2022 05:36) (17 - 17)  SpO2: 100% (08 Oct 2022 05:36) (97% - 100%)    Parameters below as of 08 Oct 2022 05:36  Patient On (Oxygen Delivery Method): room air      GENERAL: Laying comfortably, NAD  HEENT: NCAT, PERRLA, EOMI, no scleral icterus  LUNG: course lung sounds bilaterally that clear with cough  HEART: RRR; normal S1/S2; No murmurs, rubs, or gallops  ABDOMEN: +BS, soft, nontender, nondistended, no HSM; No rebound, guarding, or rigidity  EXTREMITIES:  No LE edema b/l, 2+ Peripheral Pulses, No clubbing or cyanosis  NEUROLOGY: AOx3, non-focal, strength 5/5 in all extremities, sensation intact  PSYCH: calm and cooperative  SKIN: No rashes or lesions      LABS:                        13.5   6.51  )-----------( 54       ( 07 Oct 2022 06:42 )             41.7     10-07    135  |  102  |  18  ----------------------------<  115<H>  4.9   |  19<L>  |  0.94    Ca    9.0      07 Oct 2022 06:42  Phos  3.5     10-07  Mg     2.20     10-07      PT/INR - ( 07 Oct 2022 06:42 )   PT: 12.7 sec;   INR: 1.09 ratio         PTT - ( 07 Oct 2022 06:42 )  PTT:24.8 sec

## 2022-10-08 NOTE — PROGRESS NOTE ADULT - PROBLEM SELECTOR PLAN 6
- continue taking simvastatin

## 2022-10-08 NOTE — PROGRESS NOTE ADULT - PROBLEM SELECTOR PROBLEM 1
Human metapneumovirus pneumonia

## 2022-10-08 NOTE — PROGRESS NOTE ADULT - PROBLEM SELECTOR PLAN 1
- With possible superimposed bacterial PNA   - CT chest showing New  upper lobe and both lower lobes findings c/f infection or aspiration and new 1.4 cm right upper lobe nodule. However pt denies ever choking or coughing with food  - speech and swallow - no evidence of aspiration during exam  - UA negative  - urine legionella neg  - f/u UCx, BCx  - C/w ceftriaxone for now.  -trend fever curve   -monitor hemodynamics and O2 requirements  - wean oxygen as tolerated - With possible superimposed bacterial PNA   - CT chest showing New  upper lobe and both lower lobes findings c/f infection or aspiration and new 1.4 cm right upper lobe nodule. However pt denies ever choking or coughing with food  - speech and swallow - no evidence of aspiration during exam  - UA negative  - urine legionella neg  - f/u UCx, BCx  - C/w ceftriaxone to end 10/8  -trend fever curve   -monitor hemodynamics and O2 requirements  - wean oxygen as tolerated

## 2022-10-08 NOTE — PROGRESS NOTE ADULT - PROBLEM SELECTOR PLAN 5
-Continue lisinopril with holding parameters  - dash diet

## 2022-10-08 NOTE — PROGRESS NOTE ADULT - PROBLEM SELECTOR PLAN 8
DVT prophylaxis: Due to thrombocytopenia will use SCD.    Abnormal Imaging- CT chest showed New 1.4 cm right upper lobe nodule likely related to infection process however a repeat chest CT scan is recommended by radiology in 6 weeks following treatment to assess for resolution.    Dispo- pending further medical optimization, likely home, f/u PT DVT prophylaxis: Due to thrombocytopenia will use SCD.    Abnormal Imaging- CT chest showed New 1.4 cm right upper lobe nodule likely related to infection process however a repeat chest CT scan is recommended by radiology in 6 weeks following treatment to assess for resolution.    Dispo- Anticipate home today after last dose of ceftriaxone with outpatient PT

## 2022-10-08 NOTE — PROGRESS NOTE ADULT - PROBLEM SELECTOR PLAN 4
Likely 2/2 viral infection however possibility of Drug induced throbocytopenia 2/2 depakote v azithromycin, vancomcin  No current signs or symptoms of bleeding     - Trend platelets . Can obtain plt level in blue top   -depakote levels w/i range   - Monitor levels closely and Transfuse as needed  - heme/onc consulted  - f/u hep B., hep C, and HIV Likely 2/2 viral infection however possibility of Drug induced throbocytopenia 2/2 depakote v azithromycin, vancomcin  No current signs or symptoms of bleeding     - Trend platelets . Can obtain plt level in blue top -> improving  -depakote levels w/i range   - Monitor levels closely and Transfuse as needed    - f/u hep B., hep C, and HIV

## 2022-10-08 NOTE — PROGRESS NOTE ADULT - ATTENDING COMMENTS
77 M h/o HTN, HLD, seizures presenting with cough and generalized weakness. Pt found to be hMVP positive and CT chest showing right upper lobe and both lower lobes favored to be related to infection or aspiration. Continue Ceftriaxone/Azithromycin for possible CAP. Wean O2 as tolerated. PT consult Pt thrombocytopenic, possible medication induce vs marrow suppression in setting of viral infection. f/u heme consult
77 M h/o HTN, HLD, seizures presenting with cough and generalized weakness. Pt found to be hMVP positive and CT chest showing right upper lobe and both lower lobes favored to be related to infection or aspiration. Completing 5 day course ceftriaxone today, weaned off oxygen. Pt thrombocytopenic, possible medication induce vs marrow suppression in setting of viral infection. Now improving. PT consulted and rec outpt PT.
77 M h/o HTN, HLD, seizures presenting with cough and generalized weakness. Pt found to be hMVP positive and CT chest showing right upper lobe and both lower lobes favored to be related to infection or aspiration. Continue Ceftriaxone/Azithromycin for possible CAP. Wean O2 as tolerated. PT consult

## 2022-10-08 NOTE — DISCHARGE NOTE NURSING/CASE MANAGEMENT/SOCIAL WORK - PATIENT PORTAL LINK FT
You can access the FollowMyHealth Patient Portal offered by [facility] by registering at the following website: [email or web address]. By joining [facility]’s FollowMyHealth portal, you will also be able to view your health information using other applications (apps) compatible with our system.

## 2022-10-08 NOTE — PROGRESS NOTE ADULT - ASSESSMENT
78 y/o Male, with a PmHx of HTN, HLD, focal seizures, presented with a productive cough and generalized weakness and falls found to be hMVP positive. Also s/p CT chest showing New  upper lobe and both lower lobes findings c/f infection or aspiration and new 1.4 cm right upper lobe nodule.
78 y/o Male, with a PmHx of HTN, HLD, focal seizures, presented with a productive cough and generalized weakness and falls found to be hMVP positive. Also s/p CT chest showing New  upper lobe and both lower lobes findings c/f infection or aspiration and new 1.4 cm right upper lobe nodule.
76 y/o Male, with a PmHx of HTN, HLD, focal seizures, presented with a productive cough and generalized weakness and falls found to be hMVP positive. Also s/p CT chest showing New  upper lobe and both lower lobes findings c/f infection or aspiration and new 1.4 cm right upper lobe nodule.
76 y/o Male, with a PmHx of HTN, HLD, focal seizures, presented with a productive cough and generalized weakness and falls found to be hMVP positive. Also s/p CT chest showing New  upper lobe and both lower lobes findings c/f infection or aspiration and new 1.4 cm right upper lobe nodule.

## 2022-11-10 PROBLEM — G40.109 LOCALIZATION-RELATED (FOCAL) (PARTIAL) SYMPTOMATIC EPILEPSY AND EPILEPTIC SYNDROMES WITH SIMPLE PARTIAL SEIZURES, NOT INTRACTABLE, WITHOUT STATUS EPILEPTICUS: Chronic | Status: ACTIVE | Noted: 2022-10-04

## 2022-11-25 ENCOUNTER — OUTPATIENT (OUTPATIENT)
Dept: OUTPATIENT SERVICES | Facility: HOSPITAL | Age: 77
LOS: 1 days | End: 2022-11-25
Payer: MEDICARE

## 2022-11-25 ENCOUNTER — APPOINTMENT (OUTPATIENT)
Dept: CT IMAGING | Facility: CLINIC | Age: 77
End: 2022-11-25

## 2022-11-25 DIAGNOSIS — Z98.890 OTHER SPECIFIED POSTPROCEDURAL STATES: Chronic | ICD-10-CM

## 2022-11-25 DIAGNOSIS — Z00.8 ENCOUNTER FOR OTHER GENERAL EXAMINATION: ICD-10-CM

## 2022-11-25 PROCEDURE — 71250 CT THORAX DX C-: CPT | Mod: 26,MH

## 2022-11-25 PROCEDURE — 71250 CT THORAX DX C-: CPT | Mod: MH

## 2022-11-30 ENCOUNTER — RX RENEWAL (OUTPATIENT)
Age: 77
End: 2022-11-30

## 2022-11-30 RX ORDER — DIVALPROEX SODIUM 250 MG/1
250 TABLET, EXTENDED RELEASE ORAL TWICE DAILY
Qty: 240 | Refills: 2 | Status: DISCONTINUED | COMMUNITY
Start: 2022-07-29 | End: 2022-11-30

## 2022-12-09 ENCOUNTER — APPOINTMENT (OUTPATIENT)
Dept: NEUROLOGY | Facility: CLINIC | Age: 77
End: 2022-12-09

## 2022-12-09 VITALS
BODY MASS INDEX: 24.78 KG/M2 | DIASTOLIC BLOOD PRESSURE: 78 MMHG | SYSTOLIC BLOOD PRESSURE: 138 MMHG | WEIGHT: 187 LBS | HEART RATE: 66 BPM | HEIGHT: 73 IN

## 2022-12-09 PROCEDURE — 99214 OFFICE O/P EST MOD 30 MIN: CPT

## 2022-12-09 NOTE — ASSESSMENT
[FreeTextEntry1] : Mr. RODRIGUEZ presented recently to [facility] EMU with new onset seizures, found to have seizures on EEG. Did not tolerate levetiracetam, was on divalproex 250 mg BID increased to 500 mg BID after his last breakthrough seizure on 4/2022, no longer taking Keppra. \par \par Plan\par 1. Continue with Depakote DR 500mg  tabs BID \par 2. DMV paperwork sent in requesting resumption of driving. Mr. RODRIGUEZ has not been driving. \par 3. RTC in 6-7 months with Dr Mariano\par 4. check divalproex level and CBC, CMP\par \par I have spent 30 minutes or longer reviewing patient data or discussing with the patient the cause of seizures or seizure-like events and comorbid conditions, assessing the risk of recurrence, educating the patient or family to recognize seizures, discussing possible treatment options for seizures and comorbid conditions and possible side effects of medications, and documenting encounter and plan. I also discussed seizure safety, and ways of reducing seizure risk. Greater than 50% of the encounter time was spent on counseling and coordination of care for reviewing records in [employer], discussion with patient regarding plan. \par
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
## 2022-12-12 LAB
ALBUMIN SERPL ELPH-MCNC: 4.3 G/DL
ALP BLD-CCNC: 36 U/L
ALT SERPL-CCNC: 9 U/L
ANION GAP SERPL CALC-SCNC: 12 MMOL/L
AST SERPL-CCNC: 22 U/L
BASOPHILS # BLD AUTO: 0.02 K/UL
BASOPHILS NFR BLD AUTO: 0.4 %
BILIRUB SERPL-MCNC: 0.5 MG/DL
BUN SERPL-MCNC: 14 MG/DL
CALCIUM SERPL-MCNC: 9.8 MG/DL
CHLORIDE SERPL-SCNC: 102 MMOL/L
CO2 SERPL-SCNC: 25 MMOL/L
CREAT SERPL-MCNC: 1.03 MG/DL
EGFR: 75 ML/MIN/1.73M2
EOSINOPHIL # BLD AUTO: 0.06 K/UL
EOSINOPHIL NFR BLD AUTO: 1.3 %
GLUCOSE SERPL-MCNC: 100 MG/DL
HCT VFR BLD CALC: 42.9 %
HGB BLD-MCNC: 13.1 G/DL
IMM GRANULOCYTES NFR BLD AUTO: 0.8 %
LYMPHOCYTES # BLD AUTO: 1.85 K/UL
LYMPHOCYTES NFR BLD AUTO: 38.7 %
MAN DIFF?: NORMAL
MCHC RBC-ENTMCNC: 29.8 PG
MCHC RBC-ENTMCNC: 30.5 GM/DL
MCV RBC AUTO: 97.7 FL
MONOCYTES # BLD AUTO: 0.59 K/UL
MONOCYTES NFR BLD AUTO: 12.3 %
NEUTROPHILS # BLD AUTO: 2.22 K/UL
NEUTROPHILS NFR BLD AUTO: 46.5 %
PLATELET # BLD AUTO: 113 K/UL
POTASSIUM SERPL-SCNC: 4.6 MMOL/L
PROT SERPL-MCNC: 7 G/DL
RBC # BLD: 4.39 M/UL
RBC # FLD: 14.9 %
SODIUM SERPL-SCNC: 139 MMOL/L
VALPROATE SERPL-MCNC: 71 UG/ML
WBC # FLD AUTO: 4.78 K/UL

## 2023-01-06 ENCOUNTER — APPOINTMENT (OUTPATIENT)
Dept: NEUROLOGY | Facility: CLINIC | Age: 78
End: 2023-01-06

## 2023-03-14 NOTE — DISCHARGE NOTE NURSING/CASE MANAGEMENT/SOCIAL WORK - NSDCPETBCESMAN_GEN_ALL_CORE
Patient ID: Richard is a 8 year old male.    Richard is a 8 year old male. Today he is accompanied by mother who is the primary historian for the patient.    Chief Complaint   Patient presents with   • Abdominal Pain       HPI:  7 yo boy with abd pain.  Started about two weeks ago, near end of Feb/beginning of March.  Pain feels like \"swirls\" per patient and is always in periumbilical region.  No radiation.  No worsening.  Usually hurts \"a little\" or \"medium\" amount when has the pain.  No trouble walking.    If in school occurs often after lunch and sometimes also at end of day.  Ice pack helps.  Able to return to class and stay in school.  Happens also at home on weekends.  Per mom and patient, no new changes or stressors at school or home.  Likes school and does okay there.  Always returns to class after school RN visit and participating in all classes okay, including PE and recess.    Mom has noticed some constipation.  Usually stools about three times a week or every other days.  Sometimes strains to go and has hard time having bowel movement.  Feels like stool cannot come out easily.  Thinks this has been going on for a while.  Sunday (3/12) mom gave lots of extra fruit and had normal stool.  Monday (3/13, yesterday) had small amount of hard stool.  Usually [place] 1 or 2.  No bloody stools.  No diarrhea.    No fevers, no vomiting.  No weight loss.  Mom thinks sometimes is a little more tired or pale than usual but able to do normal activities okay.  Appetite maybe a little less but not significantly decreased per mom.    No burping or belching or sour taste in mouth.  No pain or discomfort with swallowing.  Does not seem to be worse after certain types of foods.    Mom cut out danimals yogurt and some dairy but did not have much change.  No change in stools or pain after eating dairy or after cutting it down.    B- eggo, hard boiled egg, or cereal  L - 3 snacks, sandwich, juice, fruit  D - chicken, veggies;  stir kyle  Fruit - 1-2 per day during week, more on weekends  Milk - not much milk, eats some yogurt  Drinks water through the day    Pain never wakes him from sleep.  No recent illness.  No fevers, no cough, no URI sx, no v/d/rashes.  No dysuria/urgency/frequency.  No incontinence.  No h/o UTI.    Seeing podiatry for wart; large R heel plantar wart.  Treating at home and have another podiatry appt in a couple of weeks.  Mom thinks may need new referral.    ROS:  Constitutional: no night sweats, no lethargy  Resp: no SOB, no trouble breathing  CV: no palpitations, no activity intolerance, no leg swelling  GI: no bloody stools; see HPI  : no hematuria  Skin: no color change, no pallor;  Wart, see HPI  Neuro: no abnormal movements    History reviewed. No pertinent past medical history.  Patient Active Problem List   Diagnosis   • Nevus depigmentosus     Allergies: ALLERGIES:  Patient has no known allergies.     Visit Vitals  Temp 98.3 °F (36.8 °C) (Temporal)   Wt (!) 36.7 kg (81 lb)     Growth percentiles: No height on file for this encounter. 98 %ile (Z= 2.06) based on CDC (Boys, 2-20 Years) weight-for-age data using vitals from 3/14/2023.     Physical Exam:  General: WD boy, comfortable; active, well-appearing, NAD, nontoxic  Eyes: PERRL; EOMI  Ears: no mastoid erythema or TTP b/l; TMs clear and intact b/l  Nose: nares clear  Mouth/Oropharynx: o/p clear, mmm, +uvula midline, no trismus  Neck: +supple; no MEGAN  Lungs/Chest: b/l clear to ausculation; no wheezing/crackles/flaring/retractions; no distress; RR 18  CV: 2+ radial pulses b/l; brisk CRT; no murmurs/rubs/gallops; HR 74  Abd: soft, NT, ND, no guarding or rebound, +BS, no RLQ TTP, normal gait; negative obturator sign  : Ricardo I male, normal; b/l testes descended  Ext: warm and well-perfused; no clubbing/cyanosis/edema  Skin: no petechiae/purpura/pustules/vesicles; no rashes; R heel large plantar wart  Neuro: grossly normal; MAEW; no focal  deficits    Comfortable and well-appearing upon discharge.    Assessment:  9 yo boy with about 2 weeks of periumbilical abd pain and  constipation.  Pain not worsening but not improving either.  Benign abd exam.  No fevers, no vomiting, no weight loss.  Will try miralax.  Podiatry new referral also given for plantar wart.  Will refer to GI if no improvement in 2 weeks.  Well-appearing, well-hydrated, no meningismus, no increased WOB, tolerating po.  D/w mom.    Plan:  For constipation and abdominal pain:  -High fiber diet; try to increase fruits and veggies in diet.  -Limit milk; no more than 16 oz of milk per day.  -Start miralax; 3/4 capful mixed with 4-6 oz of water or juice once a day.   If stools become too loose or watery, decrease to 1/2 capful mixed in 4 oz of water once a day or give every other day instead of every day.  If stools remain hard, give 1 capful mixed in 6-8 oz of water or juice once a day.  -Call or return with fevers, severe abdominal pain, trouble walking or doing normal activities, vomiting, bloody stools, not drinking well, pain is not better in 2 weeks, new or worsened symptoms or any other concerns.    -Return criteria and signs of worsened illness discussed.  -The parent/guardian verbalized understanding of all information.  All questions answered.    I attest that I spent 30 total minutes for this patient’s encounter.   This total time included the following components: reviewing patient’s chart, reviewing results, obtaining a medical history, performing a physical examination, counseling the patient/family member/caregiver, ordering any necessary medications, tests, or procedures, documenting clinical information in the EHR, referring to or communicating with other health care professionals if needed, independently interpreting any results if applicable, and providing care coordination if applicable.       If you are a smoker, it is important for your health to stop smoking. Please be aware that second hand smoke is also harmful.

## 2023-04-28 NOTE — DIETITIAN INITIAL EVALUATION ADULT - NAME AND PHONE
[Initial Consultation] : an initial pain management consultation
Candy Daugherty, Dietetic [place] - [phone] or TEAMS

## 2023-06-10 ENCOUNTER — INPATIENT (INPATIENT)
Facility: HOSPITAL | Age: 78
LOS: 3 days | Discharge: HOME | End: 2023-06-14
Attending: INTERNAL MEDICINE | Admitting: INTERNAL MEDICINE
Payer: MEDICARE

## 2023-06-10 VITALS
HEART RATE: 72 BPM | DIASTOLIC BLOOD PRESSURE: 80 MMHG | RESPIRATION RATE: 18 BRPM | OXYGEN SATURATION: 100 % | SYSTOLIC BLOOD PRESSURE: 157 MMHG | WEIGHT: 180.78 LBS

## 2023-06-10 DIAGNOSIS — Z98.890 OTHER SPECIFIED POSTPROCEDURAL STATES: Chronic | ICD-10-CM

## 2023-06-10 DIAGNOSIS — I63.9 CEREBRAL INFARCTION, UNSPECIFIED: ICD-10-CM

## 2023-06-10 LAB
ALBUMIN SERPL ELPH-MCNC: 4.2 G/DL — SIGNIFICANT CHANGE UP (ref 3.3–5)
ALP SERPL-CCNC: 37 U/L — LOW (ref 40–120)
ALT FLD-CCNC: 12 U/L — SIGNIFICANT CHANGE UP (ref 4–41)
ANION GAP SERPL CALC-SCNC: 11 MMOL/L — SIGNIFICANT CHANGE UP (ref 7–14)
APTT BLD: 24.4 SEC — LOW (ref 27–36.3)
AST SERPL-CCNC: 24 U/L — SIGNIFICANT CHANGE UP (ref 4–40)
B PERT DNA SPEC QL NAA+PROBE: SIGNIFICANT CHANGE UP
B PERT+PARAPERT DNA PNL SPEC NAA+PROBE: SIGNIFICANT CHANGE UP
BASOPHILS # BLD AUTO: 0.02 K/UL — SIGNIFICANT CHANGE UP (ref 0–0.2)
BASOPHILS NFR BLD AUTO: 0.4 % — SIGNIFICANT CHANGE UP (ref 0–2)
BILIRUB SERPL-MCNC: 0.4 MG/DL — SIGNIFICANT CHANGE UP (ref 0.2–1.2)
BLD GP AB SCN SERPL QL: NEGATIVE — SIGNIFICANT CHANGE UP
BLOOD GAS VENOUS COMPREHENSIVE RESULT: SIGNIFICANT CHANGE UP
BORDETELLA PARAPERTUSSIS (RAPRVP): SIGNIFICANT CHANGE UP
BUN SERPL-MCNC: 16 MG/DL — SIGNIFICANT CHANGE UP (ref 7–23)
C PNEUM DNA SPEC QL NAA+PROBE: SIGNIFICANT CHANGE UP
CALCIUM SERPL-MCNC: 9.4 MG/DL — SIGNIFICANT CHANGE UP (ref 8.4–10.5)
CHLORIDE SERPL-SCNC: 98 MMOL/L — SIGNIFICANT CHANGE UP (ref 98–107)
CO2 SERPL-SCNC: 26 MMOL/L — SIGNIFICANT CHANGE UP (ref 22–31)
CREAT SERPL-MCNC: 1.12 MG/DL — SIGNIFICANT CHANGE UP (ref 0.5–1.3)
EGFR: 68 ML/MIN/1.73M2 — SIGNIFICANT CHANGE UP
EOSINOPHIL # BLD AUTO: 0.09 K/UL — SIGNIFICANT CHANGE UP (ref 0–0.5)
EOSINOPHIL NFR BLD AUTO: 1.6 % — SIGNIFICANT CHANGE UP (ref 0–6)
ETHANOL SERPL-MCNC: <10 MG/DL — SIGNIFICANT CHANGE UP
FLUAV SUBTYP SPEC NAA+PROBE: SIGNIFICANT CHANGE UP
FLUBV RNA SPEC QL NAA+PROBE: SIGNIFICANT CHANGE UP
GLUCOSE SERPL-MCNC: 121 MG/DL — HIGH (ref 70–99)
HADV DNA SPEC QL NAA+PROBE: SIGNIFICANT CHANGE UP
HCOV 229E RNA SPEC QL NAA+PROBE: SIGNIFICANT CHANGE UP
HCOV HKU1 RNA SPEC QL NAA+PROBE: SIGNIFICANT CHANGE UP
HCOV NL63 RNA SPEC QL NAA+PROBE: SIGNIFICANT CHANGE UP
HCOV OC43 RNA SPEC QL NAA+PROBE: SIGNIFICANT CHANGE UP
HCT VFR BLD CALC: 40.3 % — SIGNIFICANT CHANGE UP (ref 39–50)
HGB BLD-MCNC: 13.2 G/DL — SIGNIFICANT CHANGE UP (ref 13–17)
HMPV RNA SPEC QL NAA+PROBE: SIGNIFICANT CHANGE UP
HPIV1 RNA SPEC QL NAA+PROBE: SIGNIFICANT CHANGE UP
HPIV2 RNA SPEC QL NAA+PROBE: SIGNIFICANT CHANGE UP
HPIV3 RNA SPEC QL NAA+PROBE: SIGNIFICANT CHANGE UP
HPIV4 RNA SPEC QL NAA+PROBE: SIGNIFICANT CHANGE UP
IANC: 2.57 K/UL — SIGNIFICANT CHANGE UP (ref 1.8–7.4)
IMM GRANULOCYTES NFR BLD AUTO: 0.5 % — SIGNIFICANT CHANGE UP (ref 0–0.9)
INR BLD: 1.02 RATIO — SIGNIFICANT CHANGE UP (ref 0.88–1.16)
LYMPHOCYTES # BLD AUTO: 2.43 K/UL — SIGNIFICANT CHANGE UP (ref 1–3.3)
LYMPHOCYTES # BLD AUTO: 42.6 % — SIGNIFICANT CHANGE UP (ref 13–44)
M PNEUMO DNA SPEC QL NAA+PROBE: SIGNIFICANT CHANGE UP
MCHC RBC-ENTMCNC: 29.8 PG — SIGNIFICANT CHANGE UP (ref 27–34)
MCHC RBC-ENTMCNC: 32.8 GM/DL — SIGNIFICANT CHANGE UP (ref 32–36)
MCV RBC AUTO: 91 FL — SIGNIFICANT CHANGE UP (ref 80–100)
MONOCYTES # BLD AUTO: 0.56 K/UL — SIGNIFICANT CHANGE UP (ref 0–0.9)
MONOCYTES NFR BLD AUTO: 9.8 % — SIGNIFICANT CHANGE UP (ref 2–14)
NEUTROPHILS # BLD AUTO: 2.57 K/UL — SIGNIFICANT CHANGE UP (ref 1.8–7.4)
NEUTROPHILS NFR BLD AUTO: 45.1 % — SIGNIFICANT CHANGE UP (ref 43–77)
NRBC # BLD: 0 /100 WBCS — SIGNIFICANT CHANGE UP (ref 0–0)
NRBC # FLD: 0 K/UL — SIGNIFICANT CHANGE UP (ref 0–0)
PLATELET # BLD AUTO: 111 K/UL — LOW (ref 150–400)
POTASSIUM SERPL-MCNC: 4.4 MMOL/L — SIGNIFICANT CHANGE UP (ref 3.5–5.3)
POTASSIUM SERPL-SCNC: 4.4 MMOL/L — SIGNIFICANT CHANGE UP (ref 3.5–5.3)
PROT SERPL-MCNC: 6.8 G/DL — SIGNIFICANT CHANGE UP (ref 6–8.3)
PROTHROM AB SERPL-ACNC: 11.8 SEC — SIGNIFICANT CHANGE UP (ref 10.5–13.4)
RAPID RVP RESULT: SIGNIFICANT CHANGE UP
RBC # BLD: 4.43 M/UL — SIGNIFICANT CHANGE UP (ref 4.2–5.8)
RBC # FLD: 14.6 % — HIGH (ref 10.3–14.5)
RH IG SCN BLD-IMP: POSITIVE — SIGNIFICANT CHANGE UP
RSV RNA SPEC QL NAA+PROBE: SIGNIFICANT CHANGE UP
RV+EV RNA SPEC QL NAA+PROBE: SIGNIFICANT CHANGE UP
SARS-COV-2 RNA SPEC QL NAA+PROBE: SIGNIFICANT CHANGE UP
SODIUM SERPL-SCNC: 135 MMOL/L — SIGNIFICANT CHANGE UP (ref 135–145)
TOXICOLOGY SCREEN, DRUGS OF ABUSE, SERUM RESULT: SIGNIFICANT CHANGE UP
TROPONIN T, HIGH SENSITIVITY RESULT: 22 NG/L — SIGNIFICANT CHANGE UP
WBC # BLD: 5.7 K/UL — SIGNIFICANT CHANGE UP (ref 3.8–10.5)
WBC # FLD AUTO: 5.7 K/UL — SIGNIFICANT CHANGE UP (ref 3.8–10.5)

## 2023-06-10 PROCEDURE — 99291 CRITICAL CARE FIRST HOUR: CPT | Mod: GC

## 2023-06-10 PROCEDURE — 70450 CT HEAD/BRAIN W/O DYE: CPT | Mod: 26,77

## 2023-06-10 PROCEDURE — 99291 CRITICAL CARE FIRST HOUR: CPT

## 2023-06-10 PROCEDURE — 70496 CT ANGIOGRAPHY HEAD: CPT | Mod: 26,MA

## 2023-06-10 PROCEDURE — 0042T: CPT

## 2023-06-10 PROCEDURE — 70498 CT ANGIOGRAPHY NECK: CPT | Mod: 26,MA

## 2023-06-10 RX ORDER — SODIUM CHLORIDE 9 MG/ML
10 INJECTION INTRAMUSCULAR; INTRAVENOUS; SUBCUTANEOUS ONCE
Refills: 0 | Status: COMPLETED | OUTPATIENT
Start: 2023-06-10 | End: 2023-06-10

## 2023-06-10 RX ORDER — CHLORHEXIDINE GLUCONATE 213 G/1000ML
1 SOLUTION TOPICAL
Refills: 0 | Status: DISCONTINUED | OUTPATIENT
Start: 2023-06-10 | End: 2023-06-14

## 2023-06-10 RX ORDER — TENECTEPLASE 50 MG
21 KIT INTRAVENOUS ONCE
Refills: 0 | Status: COMPLETED | OUTPATIENT
Start: 2023-06-10 | End: 2023-06-10

## 2023-06-10 RX ORDER — DIVALPROEX SODIUM 500 MG/1
500 TABLET, DELAYED RELEASE ORAL
Refills: 0 | Status: DISCONTINUED | OUTPATIENT
Start: 2023-06-10 | End: 2023-06-10

## 2023-06-10 RX ORDER — DIVALPROEX SODIUM 500 MG/1
2 TABLET, DELAYED RELEASE ORAL
Qty: 0 | Refills: 0 | DISCHARGE

## 2023-06-10 RX ORDER — LEVETIRACETAM 250 MG/1
1 TABLET, FILM COATED ORAL
Qty: 0 | Refills: 0 | DISCHARGE

## 2023-06-10 RX ORDER — VALPROIC ACID (AS SODIUM SALT) 250 MG/5ML
500 SOLUTION, ORAL ORAL
Refills: 0 | Status: DISCONTINUED | OUTPATIENT
Start: 2023-06-10 | End: 2023-06-11

## 2023-06-10 RX ORDER — LISINOPRIL 2.5 MG/1
1 TABLET ORAL
Qty: 0 | Refills: 0 | DISCHARGE

## 2023-06-10 RX ORDER — ATORVASTATIN CALCIUM 80 MG/1
80 TABLET, FILM COATED ORAL AT BEDTIME
Refills: 0 | Status: DISCONTINUED | OUTPATIENT
Start: 2023-06-10 | End: 2023-06-11

## 2023-06-10 RX ADMIN — SODIUM CHLORIDE 10 MILLILITER(S): 9 INJECTION INTRAMUSCULAR; INTRAVENOUS; SUBCUTANEOUS at 22:48

## 2023-06-10 RX ADMIN — TENECTEPLASE 3024 MILLIGRAM(S): KIT at 22:48

## 2023-06-10 NOTE — ED ADULT NURSE NOTE - NSFALLRISKINTERV_ED_ALL_ED
Assistance OOB with selected safe patient handling equipment if applicable/Assistance with ambulation/Communicate fall risk and risk factors to all staff, patient, and family/Monitor gait and stability/Provide visual cue: yellow wristband, yellow gown, etc/Reinforce activity limits and safety measures with patient and family/Call bell, personal items and telephone in reach/Instruct patient to call for assistance before getting out of bed/chair/stretcher/Non-slip footwear applied when patient is off stretcher/[place] to call system/Physically safe environment - no spills, clutter or unnecessary equipment/Purposeful Proactive Rounding/Room/bathroom lighting operational, light cord in reach

## 2023-06-10 NOTE — ED PROVIDER NOTE - CLINICAL SUMMARY MEDICAL DECISION MAKING FREE TEXT BOX
77-year-old male history of HTN and seizure disorder [facility] presenting with a chief complaint of slurred speech.  Patient is accompanied by girlfriend at bedside.  Per EMS patient was watching TV started having drooling and slurred speech.   ROS limited secondary to immediate need for CT scan, will  obtain additional information after CT scans.  VS. B, non - actionable. PE. A&Ox3. Moving all extremities. On initial exam patient had no neuro focal neuro deficits, patient did have slurred speech. On rpt exam patient is unable to keep R arm and R leg lifted without drift. Patient stopped answering questions, but was responsive to pain.       Differential is not limited to CVA, seizure disorder, intoxication, electrolyte derangement. Plan to get CT head with perfusion studies, basic labs, cardiac labs, and neuro consult. Patient Dispo pending final reassessment and labs, patient likely to be admitted for further work up.

## 2023-06-10 NOTE — ED ADULT NURSE REASSESSMENT NOTE - NS ED NURSE REASSESS COMMENT FT1
ECG completed by writer
Shaun RN: Pt is A&Ox1 to self. Dr. Kam made aware. Dr. Drake at bedside for eval. Pt complaining of pain at RAC IV site. No infiltration, redness or swelling noted. IV flushed and positive blood return noted. Dr. Kam made aware. Respirations even and unlabored, chest rise equal b/l. NSR noted on cardiac monitor. VS as noted in flow sheets. Pt transported to stat CT. Pt then okay to transport to MICU. Report given to floor by primary RN. Safety maintained throughout.
Shaun RN: Pt is A&Ox4, resting in stretcher with no complaints at this time. Dr. Giorgio Drake at bedside. 18g IVL placed in LAC by primary RN. NIH score 7. See code stroke flow sheet. Respirations even and unlabored, chest rise equal b/l. NSR on cardiac monitor. Pt denies chest pain, SOB, fever, cough, chills, abdominal pain, N/V/D, h/a, dizziness, or any urinary symptoms at this time. TNKase prepared and verified at bedside and administered by Dr. Drake. VS as noted in flow sheets. Safety maintained throughout. Will continue to monitor.

## 2023-06-10 NOTE — CONSULT NOTE ADULT - ATTENDING COMMENTS
He is back to normal.  He has a disability - stutter and unable to read well.     Physical examination   General: No acute distress, Awake, Alert.       Mental status   Awake, alert, and oriented to person, June 7, 2032 [facility], 2nd floor (correct), Normal attention span and concentration.  Language - stutter, he says that he is at his baseline:  naming and repetition - intact;  reading ids impaired at baseline.  Pres -   Biden when I say Boy.         Cranial Nerves   II: VFF    III, IV, VI: PERRL, EOMI.     V: Facial sensation is normal B/L.     VII: Facial strength is normal B/L.   VIII: Gross hearing is intact.     IX, X: Palate is midline and elevates symmetrically.     XI: Trapezius normal strength.     XII: Tongue midline without atrophy or fasciculations.     Motor exam    Muscle tone - no evidence of rigidity or resistance in all 4 extremities.    No atrophy or fasciculations   Muscle Strength: arms and legs, proximal and distal flexors and extensors are normal     No UE drift.    Coordination   Finger to nose: Normal.    Heel to shin: Normal.       Sensory   Symmetric to TL.    A/P  Mr. 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  I agree with work up and management as above.   Thank you. He is back to normal.  He has a disability - stutter and unable to read well.     Physical examination   General: No acute distress, Awake, Alert.       Mental status   Awake, alert, and oriented to person, June 7, 2032 [facility], 2nd floor (correct), Normal attention span and concentration.  Language - stutter, he says that he is at his baseline:  naming and repetition - intact;  reading ids impaired at baseline.  Pres -   Biden when I say Boy.         Cranial Nerves   II: VFF    III, IV, VI: PERRL, EOMI.     V: Facial sensation is normal B/L.     VII: Facial strength is normal B/L.   VIII: Gross hearing is intact.     IX, X: Palate is midline and elevates symmetrically.     XI: Trapezius normal strength.     XII: Tongue midline without atrophy or fasciculations.     Motor exam    Muscle tone - no evidence of rigidity or resistance in all 4 extremities.    No atrophy or fasciculations   Muscle Strength: arms and legs, proximal and distal flexors and extensors are normal     No UE drift.    Coordination   Finger to nose: Normal.    Heel to shin: Normal.       Sensory   Symmetric to TL.    A/P  Mr. 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  I agree with work up and management as above.   Thank you.    There is a high probability of sudden, clinically significant, or life threatening deterioration in the patient’s condition which requires the highest level of physician preparedness to intervene urgently.  Critical care time:  40 minutes.

## 2023-06-10 NOTE — ED PROVIDER NOTE - ATTENDING CONTRIBUTION TO CARE
Attending note:   After face to face evaluation of this patient, I concur with above noted hx, pe, and care plan for this patient.  Stephanie: 77-year-old male with history of seizure disorder on valproic acid.  Patient was with his girlfriend when at approximately 8 or 8:30 PM patient had slurred speech and drooling.  EMS noted patient to be confused and saying nonsensical things such as calling his ID a chicken.  As per patient's girlfriend last witnessed seizure was 1 year ago that had shaking of the arms and legs.  Patient was initially evaluated in triage and there is no facial asymmetry and patient is moving extremities equal bilaterally.  At that time patient's speech was slightly dysarthric and dysphasic.  Instead of saying it is important same day patient is only able to say "it is a son day".  While patient was outside of CAT scan he was noted by other staff to have difficulty moving his right arm and right leg.  Now in the room patient appears to have decreased sensation on the right leg or at least right leg neglect.  Once again no facial asymmetry is noted and motor appears to be normal.  Extraocular movements are intact and visual fields are full.  Heart is regular and lungs are clear.  Abdomen is soft nontender.  Patient's vital signs show blood pressure to be normal with normal heart rate and fingerstick to be in the 130s.  Stroke code was initially called and neuro resident evaluated patient outside of CAT scan.  CT, CTA and perfusion images were performed.  Awaiting labs including valproic acid level and awaiting neurology recommendations.  We will monitor patient closely for changes in clinical exam.

## 2023-06-10 NOTE — CONSULT NOTE ADULT - SUBJECTIVE AND OBJECTIVE BOX
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    Allergies:  No Known Allergies      PMHx:   Hypertension  Hyperlipidemia  Focal epilepsy      PFHx:   FHx: lung cancer      PSuHx:   S/P left rotator cuff repair  S/P right rotator cuff repair        Medications:  MEDICATIONS  (STANDING):    MEDICATIONS  (PRN):      Vitals:  T(C): --  HR: --  BP: --  RR: --  SpO2: --    Physical Examination: INCOMPLETE  ***    Labs:          CAPILLARY BLOOD GLUCOSE      POCT Blood Glucose.: 114 mg/dL (10 Onur 2023 21:23)        Radiology:    PRIOR CT Head No Cont (10.04.22 @ 00:46)  IMPRESSION:    CT head: No acute intracranial hemorrhage or mass effect.    CT cervical spine: No evidence for acute displaced fracture or   malalignment. Chronic degenerative changes.       PRIOR MR Brain-Seizure, Epilepsy w/wo IV Cont (10.24.20 @ 21:07)  Ventricular and sulcal prominence is consistent with age-appropriate involutional change. Small vessel white matter ischemic changes are noted. No acute infarcts are seen. There is no new hemorrhage. After contrast administration there is normal intracranial vascular enhancement. No abnormal parenchymal or leptomeningeal enhancement is identified.    No abnormal signal in the temporal lobes is identified.    The sellar and parasellar structures are unremarkable.      Impression: Age-appropriate involutional and ischemic gliotic changes. No acute infarcts, hemorrhage or mass. No abnormal enhancement.    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   - left temporal sharp waves   Impression/Clinical Correlate:   There is evidence in current recording of left temporal cortical irritability   Two subtle left hemispheric electrographic seizures with unclear localization at onset evolving over the   temporal region were seen and reported 10/23.         Review of Systems:  INCOMPLETE   CONSTITUTIONAL: No fevers or chills  EYES/ENT: No visual changes or no throat pain   NECK: No pain or stiffness  RESPIRATORY: No hemoptysis or shortness of breath  CARDIOVASCULAR: No chest pain or palpitations  GASTROINTESTINAL: No melena or hematochezia  GENITOURINARY: No dysuria or hematuria  NEUROLOGICAL: +As stated in HPI above  SKIN: No itching, burning, rashes, or lesions   All other review of systems is negative unless indicated above.    Allergies:  No Known Allergies      PMHx:   Hypertension  Hyperlipidemia  Focal epilepsy      PFHx:   FHx: lung cancer      PSuHx:   S/P left rotator cuff repair  S/P right rotator cuff repair        Medications:  MEDICATIONS  (STANDING):    MEDICATIONS  (PRN):      Vitals:  T(C): --  HR: --  BP: --  RR: --  SpO2: --    Physical Examination: INCOMPLETE  ***    Labs:          CAPILLARY BLOOD GLUCOSE      POCT Blood Glucose.: 114 mg/dL (10 Onur 2023 21:23)        Radiology:    PRIOR CT Head No Cont (10.04.22 @ 00:46)  IMPRESSION:    CT head: No acute intracranial hemorrhage or mass effect.    CT cervical spine: No evidence for acute displaced fracture or   malalignment. Chronic degenerative changes.       PRIOR MR Brain-Seizure, Epilepsy w/wo IV Cont (10.24.20 @ 21:07)  Ventricular and sulcal prominence is consistent with age-appropriate involutional change. Small vessel white matter ischemic changes are noted. No acute infarcts are seen. There is no new hemorrhage. After contrast administration there is normal intracranial vascular enhancement. No abnormal parenchymal or leptomeningeal enhancement is identified.    No abnormal signal in the temporal lobes is identified.    The sellar and parasellar structures are unremarkable.      Impression: Age-appropriate involutional and ischemic gliotic changes. No acute infarcts, hemorrhage or mass. No abnormal enhancement.    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 Patient accompanied by girlfriend in ED. Per girlfriend, patient was watching television when he starting drooling and had slurred speech. When patient was found by EMS, he was not able to identify objects. Patient's girlfriend unable to describe patient's typical seizures. Per EMS, last known normal 20:00 6/10/23. Code stroke called in the ED 6/10/23. During code stroke, patient seemingly improved. However, while testing for upper extremity drift, patient developed worsening RUE weakness, also found to have R > L LE drift. Patient also with worsening speech during code stroke. (See examination below.) Of note, patient's daughter Chasity later added that he has a history of stuttering since childhood. Patient's girlfriend at bedside reports that patient has never sounded like the way he does now.    On chart review of outpatient and inpatient neurology records, patient's seizure semiology includes sudden episode of impaired awareness preceded by apneic gasps. EEG 10/25/20: left temporal sharp waves, two subtle left hemispheric electrographic seizures, evolving over the temporal region. Patient with allergy to levetiracetam. Per patient, he is currently on valproic acid BID. Patient denies missing doses of medications.        Review of Systems:    All other review of systems is negative unless indicated above.    Allergies:  No Known Allergies      PMHx:   Hypertension  Hyperlipidemia  Focal epilepsy      PFHx:   FHx: lung cancer      PSuHx:   S/P left rotator cuff repair  S/P right rotator cuff repair        Medications:  MEDICATIONS  (STANDING):    MEDICATIONS  (PRN):      Vitals:  T(C): --  HR: --  BP: --  RR: --  SpO2: --    Physical Examination:      Constitutional: well-developed, well-nourished, well-groomed  Eyes: ophthalmoscopic exam deferred secondary to COVID-19 pandemic  Cardiovascular: no swelling, warm-to-touch    Neurological Examination:    - Mental Status: Eyes open, attends to examiner; oriented to person; patient not oriented to age, month, or year; speech is dysfluent (i/s/o stuttering?) with mildly impaired naming (perseverated "feather," unable to name "cactus," otherwise named other items), mild-to-moderately impaired repetition (paraphasic errors),, and follows most 1-step commands; unable to follow 3-step mid-line crossing commands    - Cranial Nerves:  II: Visual fields are full to confrontation; pupils are equal, round, and reactive to light   III, IV, VI: Extraocular movements are intact without nystagmus  V: Facial sensation is intact in the V1-V3 distribution bilaterally  VII: Face is symmetric with normal eye closure and smile  VIII: Hearing is intact to conversation  IX, X: Uvula is midline and soft palate rises symmetrically  XI: Shoulder shrug intact  XII: Tongue protrudes in the midline    - Motor/Strength Testing:  - Initial examination: no drift LUE, 1+ drift RUE, 1+ drift LLE, 2+ drift RLE  - Final examination prior to TNK - no drifts    - Motor examination below takes place after initial NIHSS examination:                                   Right           Left  Biceps                      5                 5  Triceps                     5                 5  Hand                  5                 5    Hip Flex                   5                  5  Knee Ext	      5                  5  Dorsiflex                  5                  5  Plantarflex               5                  5    - Normal muscle bulk and tone throughout.    - Sensory: Intact throughout to light touch x 4 extremities.  - Coordination: Finger-nose-finger intact without ataxia or dysmetria.        Labs:          CAPILLARY BLOOD GLUCOSE      POCT Blood Glucose.: 114 mg/dL (10 Onur 2023 21:23)        Radiology:    CT Brain Stroke Protocol (06.10.23 @ 21:45)  IMPRESSION:  No acute intracranial hemorrhage. No CT evidence of acute, large   territorial infarct.       CTA H/N, CT Brain Perfusion Maps Stroke (06.10.23 @ 21:46)  N:    CTA BRAIN: Patent intracranial circulation. No flow-limiting stenosis or   occlusion.    CTA NECK: Patent cervical vasculature. No flow limiting stenosis or   occlusion.    CT PERFUSION: No core infarct. Small area of prolonged Tmax at the left   parieto-occipital junction is of uncertain clinical significance,   possibly artifact.       PRIOR CT Head No Cont (10.04.22 @ 00:46)  IMPRESSION:    CT head: No acute intracranial hemorrhage or mass effect.    CT cervical spine: No evidence for acute displaced fracture or   malalignment. Chronic degenerative changes.       PRIOR MR Brain-Seizure, Epilepsy w/wo IV Cont (10.24.20 @ 21:07)  Ventricular and sulcal prominence is consistent with age-appropriate involutional change. Small vessel white matter ischemic changes are noted. No acute infarcts are seen. There is no new hemorrhage. After contrast administration there is normal intracranial vascular enhancement. No abnormal parenchymal or leptomeningeal enhancement is identified.    No abnormal signal in the temporal lobes is identified.    The sellar and parasellar structures are unremarkable.      Impression: Age-appropriate involutional and ischemic gliotic changes. No acute infarcts, hemorrhage or mass. No abnormal enhancement.  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 Patient accompanied by girlfriend in ED. Per girlfriend, patient was watching television when he starting drooling and had slurred speech. When patient was found by EMS, he was not able to identify objects. Patient's girlfriend unable to describe patient's typical seizures. Per EMS, last known normal 20:00 6/10/23. Code stroke called in the ED 6/10/23. During code stroke, patient seemingly improved. However, while testing for upper extremity drift, patient developed worsening RUE weakness, also found to have R > L LE drift. Patient also with worsening speech during code stroke. (See examination below.) Of note, patient's daughter Chasity later added that he has a history of stuttering since childhood. Patient's girlfriend at bedside reports that patient has never sounded like the way he does now.    On chart review of outpatient and inpatient neurology records, patient's seizure semiology includes sudden episode of impaired awareness preceded by apneic gasps. EEG 10/25/20: left temporal sharp waves, two subtle left hemispheric electrographic seizures, evolving over the temporal region. Patient with allergy to levetiracetam. Per patient, he is currently on valproic acid BID. Patient denies missing doses of medications.        Review of Systems:    All other review of systems is negative unless indicated above.    Allergies:  No Known Allergies      PMHx:   Hypertension  Hyperlipidemia  Focal epilepsy      PFHx:   FHx: lung cancer      PSuHx:   S/P left rotator cuff repair  S/P right rotator cuff repair        Medications:  MEDICATIONS  (STANDING):    MEDICATIONS  (PRN):      Vitals:  T(C): --  HR: --  BP: --  RR: --  SpO2: --    Physical Examination:      Constitutional: well-developed, well-nourished, well-groomed  Eyes: ophthalmoscopic exam deferred secondary to COVID-19 pandemic  Cardiovascular: no swelling, warm-to-touch    Neurological Examination:    - Mental Status: Eyes open, attends to examiner; oriented to person; patient not oriented to age, month, or year; speech is dysfluent (i/s/o stuttering?) with mildly impaired naming (perseverated "feather," unable to name "cactus," otherwise named other items), mild-to-moderately impaired repetition (paraphasic errors),, and follows most 1-step commands; unable to follow 3-step mid-line crossing commands    - Cranial Nerves:  II: Visual fields are full to confrontation; pupils are equal, round, and reactive to light   III, IV, VI: Extraocular movements are intact without nystagmus  V: Facial sensation is intact in the V1-V3 distribution bilaterally  VII: Face is symmetric with normal eye closure and smile. Mild dysarthria.  VIII: Hearing is intact to conversation  IX, X: Uvula is midline and soft palate rises symmetrically  XI: Shoulder shrug intact  XII: Tongue protrudes in the midline    - Motor/Strength Testing:  - Initial examination: no drift LUE, 1+ drift RUE, 1+ drift LLE, 2+ drift RLE  - Final examination prior to TNK - no drifts    - Motor examination below takes place after initial NIHSS examination:                                   Right           Left  Biceps                      5                 5  Triceps                     5                 5  Hand                  5                 5    Hip Flex                   5                  5  Knee Ext	      5                  5  Dorsiflex                  5                  5  Plantarflex               5                  5    - Normal muscle bulk and tone throughout.    - Sensory: Intact throughout to light touch x 4 extremities.  - Coordination: Finger-nose-finger intact without ataxia or dysmetria.        Labs:          CAPILLARY BLOOD GLUCOSE      POCT Blood Glucose.: 114 mg/dL (10 Onur 2023 21:23)        Radiology:    CT Brain Stroke Protocol (06.10.23 @ 21:45)  IMPRESSION:  No acute intracranial hemorrhage. No CT evidence of acute, large   territorial infarct.       CTA H/N, CT Brain Perfusion Maps Stroke (06.10.23 @ 21:46)  N:    CTA BRAIN: Patent intracranial circulation. No flow-limiting stenosis or   occlusion.    CTA NECK: Patent cervical vasculature. No flow limiting stenosis or   occlusion.    CT PERFUSION: No core infarct. Small area of prolonged Tmax at the left   parieto-occipital junction is of uncertain clinical significance,   possibly artifact.       PRIOR CT Head No Cont (10.04.22 @ 00:46)  IMPRESSION:    CT head: No acute intracranial hemorrhage or mass effect.    CT cervical spine: No evidence for acute displaced fracture or   malalignment. Chronic degenerative changes.       PRIOR MR Brain-Seizure, Epilepsy w/wo IV Cont (10.24.20 @ 21:07)  Ventricular and sulcal prominence is consistent with age-appropriate involutional change. Small vessel white matter ischemic changes are noted. No acute infarcts are seen. There is no new hemorrhage. After contrast administration there is normal intracranial vascular enhancement. No abnormal parenchymal or leptomeningeal enhancement is identified.    No abnormal signal in the temporal lobes is identified.    The sellar and parasellar structures are unremarkable.      Impression: Age-appropriate involutional and ischemic gliotic changes. No acute infarcts, hemorrhage or mass. No abnormal enhancement.

## 2023-06-10 NOTE — H&P ADULT - NSHPREVIEWOFSYSTEMS_GEN_ALL_CORE
REVIEW OF SYSTEMS:    CONSTITUTIONAL: No weakness, fevers or chills  EYES: No visual changes; no sclera icterics, no pain or drainage  ENT:  No vertigo or throat pain   NECK: No pain or stiffness  RESPIRATORY: No cough, wheezing, hemoptysis; No shortness of breath  CARDIOVASCULAR: No chest pain or palpitations  GASTROINTESTINAL: No abdominal or epigastric pain. No nausea, vomiting, or hematemesis; No diarrhea or constipation. No melena or hematochezia.  GENITOURINARY: No dysuria, frequency or hematuria  NEUROLOGICAL: slurred speech; No numbness or weakness  SKIN: No itching, rashes  Psych: No anxiety or depression

## 2023-06-10 NOTE — STROKE CODE NOTE - NSSTROKETPADOOR_OTHER_FT
Known history of focal epilepsy, but unclear history from collateral regarding whether this was similar semiology

## 2023-06-10 NOTE — H&P ADULT - NSHPLABSRESULTS_GEN_ALL_CORE
Personally reviewed labs, imaging, ekg                           13.2   5.70  )-----------( 111      ( 10 Onur 2023 21:30 )             40.3       06-10    135  |  98  |  16  ----------------------------<  121<H>  4.4   |  26  |  1.12    Ca    9.4      10 Onur 2023 21:30    TPro  6.8  /  Alb  4.2  /  TBili  0.4  /  DBili  x   /  AST  24  /  ALT  12  /  AlkPhos  37<L>  06-10                  PT/INR - ( 10 Onur 2023 21:30 )   PT: 11.8 sec;   INR: 1.02 ratio         PTT - ( 10 Onur 2023 21:30 )  PTT:24.4 sec    Lactate Trend            CAPILLARY BLOOD GLUCOSE      POCT Blood Glucose.: 114 mg/dL (10 Onur 2023 21:23)

## 2023-06-10 NOTE — H&P ADULT - HISTORY OF PRESENT ILLNESS
78 y/o M with PMH of HTN and seizure disorder BIBEMS after developing slurred speech last night. Pt's girlfriend at bedside, pt does not remember episode, however girlfriend reports that she was watching a TV show with him about aliens and pt suddenly developed slurred speech, made gasping sounds and raspy noises, so girlfriend became concerned and called 911. They told her to leave him on the couch, so she waited until EMS arrived and took him to the ED. She did not note any rhythmic jerking or tonic clonic movements, reports he was recently hospitalized for a pneumonia but does not remember for what exactly, reports his last seizure was in 2020. Denied bowel/bladder incontinence, fever, chills, CP, SOB, nausea, vomiting, diarrhea, constipation, dysuria, hematuria, hematochezia, melena.    Per chart, pt was apparently not able to identify objects as per EMS, last known normal offiically 8 PM on 6/10/23, had R > L LE drift in ED during code stroke. Pt's daughter Chasity mentioned pt has had stuttering since childhood, but per girlfriend at bedside, this was different than usual. Girlfriend reported improvement after TPA administration.     Pt's seizure history: sudden episodes of impaired awareness preceded by apneic gasps. Had EEG 10/25/20 showing left temporal sharp waves, two subtle left hemispheric electrographic seizures evolving over temporal region.    Pt allergic to keppra.      ED Course:  Vitals: T afebrile, HR 72, /80, RR 18, SpO2 100% on RA    Code Stroke called, CT Head, CTA H/N remarkable for small area of prolonged Tmax at the left parieto-occipital junction is of uncertain clinical significance, possibly artifact.    received tenecteplase x 1 at 22:48 PM on 6/10/23

## 2023-06-10 NOTE — ED PROVIDER NOTE - OBJECTIVE STATEMENT
77-year-old male history of HTN and seizure disorder BIBEMS presenting with a chief complaint of slurred speech.  Patient is accompanied by girlfriend at bedside.  Per EMS patient was watching TV started having drooling and slurred speech.  When patient was found by EMS patient was not able to identify objects.  Patient girlfriend unable to describe patient's normal seizures.  On examination patient has heavy speech, unable to completely repeat statements and some confusion.  Last known normal today @ 8:20 pm. Code stroke was called.   ROS limited secondary to immediate need for CT scan, will  obtain additional information after CT scans.

## 2023-06-10 NOTE — CONSULT NOTE ADULT - ASSESSMENT
Assessment: ***    LKN: 20:00 6/10/23  mRS: 0  NIH: Initial 8, subsequent 3 (remained with mild-to-moderate aphasia and incorrect LOC answers)  Tenecteplase pushed - 22:48 PM 6/10/23    Impression: ***    Recommendations   Imaging:  [] rCTH at 24 hours to assess for stability  [] MRI brain w/o contrast to evaluate for acute ischemic stroke  [] TTE  [] Consider ILR  [] Baseline EKG & CXR    Meds:  [] Cardene drip PRN to keep BP < 180/105  [] Aspirin 81 mg daily will be started in 24h if rCTH negative for hemorrhage.   [] Atorvastatin 40 mg daily (long-term goal and titrate to LDL < 70)  [] Pharmacologic DVT prophylaxis started in 24h if rCTH negative for hemorrhage.    Labs:  [] CBC, CMP, Troponin, Coags  [] Lipid Panel  [] HgA1C    Other:  [] MICU Consult   [] Frequent neuro-checks (q15 for 4h then q1h for 24h, then q4h thereafter)  [] BP checks: q15min for first 2h, then q30 for 6h, then hourly until 24h.  [] Permissive HTN up to /105 for 24 hours then gradual normotension over 2-3 days.   [] Telemetry   [] DVT prophylaxis with SCDs until cleared for pharmacological prophylaxis.  [] No Nicolas catheter removal or placement x 24h. No venous/arterial puncture at non-compressible site.  [] Nothing by mouth (NPO) including oral medication pending bedside Dysphagia Screen or  Speech Language Pathologist evaluation (may be done in ED upon presentation even at time  of IV tenecteplase administration) and for 6 hours post IV tenecteplase administration (If  dysphagia screen is passed, then NPO except for medications during these first 6 hours)  [] Head of bed > 30 degrees for aspiration prevention  [] Aspiration Precautions  [] Fall Precautions  [] PT/OT  [] S/S eval at least 12 hours post tenecteplase   [] Outpatient follow up with stroke NP, Arianna Ovalles, within 1 week of discharge  [address] [facility], [address] [place]; # [phone]     * Case and plan for tenecteplase discussed by Dr Reece *    Risk/Benefit Conversation Occurred: The risks, benefits, alternatives, and potential complications of thrombolytic (including bleeding in the brain or other parts of the body, allergic reaction, and/or even death) were discussed. We believe the benefits of IV thrombolytic outweigh the risks of the drug.  Through shared decision making, the patient (and/or patient’s agent or surrogate) expressed understanding and agree with plan to treat potential stroke with thrombolytic. Patient (and/or patient’s agent or surrogate) informed of potential for stroke mimics.  An opportunity to ask questions and have them answered was provided.  Assessment: ***    LKN: 20:00 6/10/23  mRS: 0  NIH: Initial 8, subsequent 3 (remained with mild-to-moderate aphasia and incorrect LOC answers)  Tenecteplase pushed - 22:48 PM 6/10/23    Impression: ***    Recommendations   Imaging:  [] rCTH at 24 hours to assess for stability  [] MRI brain w/o contrast to evaluate for acute ischemic stroke  [] TTE  [] Consider ILR  [] Baseline EKG & CXR    Meds:  [] Change home VPA/Depakote to IV formulation  [] Cardene drip PRN to keep BP < 180/105  [] Aspirin 81 mg daily will be started in 24h if rCTH negative for hemorrhage.   [] Atorvastatin 40 mg daily (long-term goal and titrate to LDL < 70)  [] Pharmacologic DVT prophylaxis started in 24h if rCTH negative for hemorrhage.    Labs:  [] CBC, CMP, Troponin, Coags  [] Lipid Panel  [] HgA1C    Other:  [] MICU Consult   [] Frequent neuro-checks (q15 for 4h then q1h for 24h, then q4h thereafter)  [] BP checks: q15min for first 2h, then q30 for 6h, then hourly until 24h.  [] Permissive HTN up to /105 for 24 hours then gradual normotension over 2-3 days.   [] Telemetry   [] DVT prophylaxis with SCDs until cleared for pharmacological prophylaxis.  [] No Nicolas catheter removal or placement x 24h. No venous/arterial puncture at non-compressible site.  [] Nothing by mouth (NPO) including oral medication pending bedside Dysphagia Screen or  Speech Language Pathologist evaluation (may be done in ED upon presentation even at time  of IV tenecteplase administration) and for 6 hours post IV tenecteplase administration (If  dysphagia screen is passed, then NPO except for medications during these first 6 hours)  [] Head of bed > 30 degrees for aspiration prevention  [] Aspiration Precautions  [] Fall Precautions  [] PT/OT  [] S/S eval at least 12 hours post tenecteplase   [] Outpatient follow up with stroke NP, Arianna Ovalles, within 1 week of discharge  [address] [facility], [address] [place]; # [phone]     * Case and plan for tenecteplase discussed by Dr Reece *    Risk/Benefit Conversation Occurred: The risks, benefits, alternatives, and potential complications of thrombolytic (including bleeding in the brain or other parts of the body, allergic reaction, and/or even death) were discussed. We believe the benefits of IV thrombolytic outweigh the risks of the drug.  Through shared decision making, the patient (and/or patient’s agent or surrogate) expressed understanding and agree with plan to treat potential stroke with thrombolytic. Patient (and/or patient’s agent or surrogate) informed of potential for stroke mimics.  An opportunity to ask questions and have them answered was provided.  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 Patient accompanied by girlfriend in ED. Per girlfriend, patient was watching television when he starting drooling and had slurred speech. When patient was found by EMS, he was not able to identify objects. Patient's girlfriend unable to describe patient's typical seizures. Per EMS, last known normal 20:00 6/10/23. Code stroke called in the ED 6/10/23. During code stroke, patient seemingly improved. However, while testing for upper extremity drift, patient developed worsening RUE weakness, also found to have R > L LE drift. Patient also with worsening speech during code stroke. (See examination below.) Of note, patient's daughter Chasity later added that he has a history of stuttering since childhood. Patient's girlfriend at bedside reports that patient has never sounded like the way he does now. On chart review of outpatient and inpatient neurology records, patient's seizure semiology includes sudden episode of impaired awareness preceded by apneic gasps. EEG 10/25/20: left temporal sharp waves, two subtle left hemispheric electrographic seizures, evolving over the temporal region. Patient with allergy to levetiracetam. Per patient, he is currently on valproic acid BID. Patient denies missing doses of medications.    Code stroke events: As above, patient seemingly improved on first evaluation compared to onset of symptoms. During NIHSS evaluation, patient w/ 1+ RUE drift, then became 2+ RUE drift. R > L LE drift. Then, speech arrest vs impaired awareness -- patient staring ahead in primary gaze, unresponsive, eyes open, but not attending to examiner. Patient wheeled to CT scanner from ED hallway, then seen laughing. CT studies obtained. On subsequent evaluation, no drifts in any extremities.    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 Fluctuating phenomenology, but primarily varying degrees of aphasia. Symptoms localizable to L brain dysfunction. Etiology possibly acute ischemic stroke secondary to unclear mechanism at this time d/t incomplete workup; vs focal seizures.    Recommendations   Imaging:  [] Video EEG 6/11/23 AM - urgent  [] Urinalysis, drug and toxicology screen  [] rCTH at 24 hours to assess for stability  [] MRI brain w/o contrast to evaluate for acute ischemic stroke  [] TTE  [] Consider ILR  [] Baseline EKG & CXR    Meds:  [] Change home VPA/Depakote to IV formulation. Patient taking PO BID at home. Please give same daily total VPA as IVPB, but divided into TID, rather than BID.  [] Cardene drip PRN to keep BP < 180/105  [] Aspirin 81 mg daily will be started in 24h if rCTH negative for hemorrhage.   [] Atorvastatin 40 mg daily (long-term goal and titrate to LDL < 70)  [] Pharmacologic DVT prophylaxis started in 24h if rCTH negative for hemorrhage.    Labs:  [] CBC, CMP, Troponin, Coags  [] Lipid Panel  [] HgA1C    Other:  [] MICU Consult   [] Frequent neuro-checks (q15 for 4h then q1h for 24h, then q4h thereafter)  [] BP checks: q15min for first 2h, then q30 for 6h, then hourly until 24h.  [] Permissive HTN up to /105 for 24 hours then gradual normotension over 2-3 days.   [] Telemetry   [] DVT prophylaxis with SCDs until cleared for pharmacological prophylaxis.  [] No Nicolas catheter removal or placement x 24h. No venous/arterial puncture at non-compressible site.  [] Nothing by mouth (NPO) including oral medication pending bedside Dysphagia Screen or  Speech Language Pathologist evaluation (may be done in ED upon presentation even at time  of IV tenecteplase administration) and for 6 hours post IV tenecteplase administration (If  dysphagia screen is passed, then NPO except for medications during these first 6 hours)  [] Head of bed > 30 degrees for aspiration prevention  [] Aspiration Precautions  [] Fall Precautions  [] PT/OT  [] S/S eval at least 12 hours post tenecteplase   [] Outpatient follow up with stroke NP, Arianna Ovalles, within 1 week of discharge  [address] [facility], [address] [place]; Ph# [phone]     * Case and plan for tenecteplase discussed by Dr. Reece *    Risk/Benefit Conversation Occurred between on call neurology resident and patient's daughter Chasity over the phone ([phone]): The risks, benefits, alternatives, and potential complications of thrombolytic (including bleeding in the brain or other parts of the body, allergic reaction, and/or even death) were discussed. We believe the benefits of IV thrombolytic outweigh the risks of the drug.  Through shared decision making, the patient (and/or patient’s agent or surrogate) expressed understanding and agree with plan to treat potential stroke with thrombolytic. Patient (and/or patient’s agent or surrogate) informed of potential for stroke mimics.  An opportunity to ask questions and have them answered was provided.    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 Patient accompanied by girlfriend in ED. Per girlfriend, patient was watching television when he starting drooling and had slurred speech. When patient was found by EMS, he was not able to identify objects. Patient's girlfriend unable to describe patient's typical seizures. Per EMS, last known normal 20:00 6/10/23. Code stroke called in the ED 6/10/23. During code stroke, patient seemingly improved. However, while testing for upper extremity drift, patient developed worsening RUE weakness, also found to have R > L LE drift. Patient also with worsening speech during code stroke. (See examination below.) Of note, patient's daughter Chasity later added that he has a history of stuttering since childhood. Patient's girlfriend at bedside reports that patient has never sounded like the way he does now. On chart review of outpatient and inpatient neurology records, patient's seizure semiology includes sudden episode of impaired awareness preceded by apneic gasps. EEG 10/25/20: left temporal sharp waves, two subtle left hemispheric electrographic seizures, evolving over the temporal region. Patient with allergy to levetiracetam. Per patient, he is currently on valproic acid BID. Patient denies missing doses of medications.    Code stroke events: As above, patient seemingly improved on first evaluation compared to onset of symptoms. During NIHSS evaluation, patient w/ 1+ RUE drift, then became 2+ RUE drift. R > L LE drift. Then, speech arrest vs impaired awareness -- patient staring ahead in primary gaze, unresponsive, eyes open, but not attending to examiner. Patient wheeled to CT scanner from ED hallway, then seen laughing. CT studies obtained. On subsequent evaluation, no drifts in any extremities.    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 Fluctuating phenomenology, but primarily varying degrees of aphasia. Symptoms localizable to L brain dysfunction. Etiology possibly acute ischemic stroke secondary to unclear mechanism at this time d/t incomplete workup; vs focal seizures.    Recommendations   Imaging:  [] Video EEG 6/11/23 AM - urgent  [] Urinalysis, drug and toxicology screen  [] rCTH at 24 hours to assess for stability  [] MRI brain w/o contrast to evaluate for acute ischemic stroke  [] TTE  [] Consider ILR  [] Baseline EKG & CXR    Meds:  [] Change home VPA/Depakote to IV formulation. Patient taking PO BID at home. Please give same daily total VPA as IVPB, but divided into TID, rather than BID.  [] Cardene drip PRN to keep BP < 180/105  [] Aspirin 81 mg daily will be started in 24h if rCTH negative for hemorrhage.   [] Atorvastatin 40 mg daily (long-term goal and titrate to LDL < 70)  [] Pharmacologic DVT prophylaxis started in 24h if rCTH negative for hemorrhage.    Labs:  [] CBC, CMP, Troponin, Coags  [] Lipid Panel  [] HgA1C    Other:  [] MICU Consult   [] Frequent neuro-checks (q15 for 4h then q1h for 24h, then q4h thereafter)  [] BP checks: q15min for first 2h, then q30 for 6h, then hourly until 24h.  [] Permissive HTN up to /105 for 24 hours then gradual normotension over 2-3 days.   [] Telemetry   [] DVT prophylaxis with SCDs until cleared for pharmacological prophylaxis.  [] No Nicolas catheter removal or placement x 24h. No venous/arterial puncture at non-compressible site.  [] Nothing by mouth (NPO) including oral medication pending bedside Dysphagia Screen or  Speech Language Pathologist evaluation (may be done in ED upon presentation even at time  of IV tenecteplase administration) and for 6 hours post IV tenecteplase administration (If  dysphagia screen is passed, then NPO except for medications during these first 6 hours)  [] Head of bed > 30 degrees for aspiration prevention  [] Aspiration Precautions  [] Fall Precautions  [] PT/OT  [] S/S eval at least 12 hours post tenecteplase   [] Outpatient follow up with stroke NP, Arianna Ovalles, within 1 week of discharge  [address] [facility], [address] [place]; Ph# [phone]   F/U with his outpatient epileptologist - Dr. Panfilo Mariano - same phone number.     * Case and plan for tenecteplase discussed by Dr. Reece *    Risk/Benefit Conversation Occurred between on call neurology resident and patient's daughter Chasity over the phone ([phone]): The risks, benefits, alternatives, and potential complications of thrombolytic (including bleeding in the brain or other parts of the body, allergic reaction, and/or even death) were discussed. We believe the benefits of IV thrombolytic outweigh the risks of the drug.  Through shared decision making, the patient (and/or patient’s agent or surrogate) expressed understanding and agree with plan to treat potential stroke with thrombolytic. Patient (and/or patient’s agent or surrogate) informed of potential for stroke mimics.  An opportunity to ask questions and have them answered was provided.

## 2023-06-10 NOTE — H&P ADULT - ATTENDING COMMENTS
pt is a 76 yo male with hx htn, sz disorder  who was noted to have  speech difficulty per girl friend, Pt does not remember, 911 called  pt txed to the ED, CT head and ct angio , unremarkable, Pt   given tnk at 11 pm.  Per gir friend, speech difficulty now resolved  bp 140/74 rr 18 heent  pupils equal , reactive no nystagmus  neck supple, lungs clear, heart regular abdomen nontender  motor 5/5 all ext sensation intact    labs   wbc 5 hgb 13 hct 40 plts 111    bicarb 26    cr 1.12    A/P  76 yo male with hx sz disorder , speech difficulty s/p tnk  for possible acute thrombotic stroke,   -neuro checks q 1 hrs in the icu  -keep bp below sbp 220  -venodynes for dvt prophylaxis  -check echo to evaluate lv function  -monitor urine output  -continue seizure meds  -speech and swallowing evaluation  -neurology f/i  -repeat ct in 24 hrs  -consider asa, hep sq, atorvastatin post f/u ct scan  critically ill with acute cva

## 2023-06-10 NOTE — H&P ADULT - NSHPPHYSICALEXAM_GEN_ALL_CORE
VITALS:   T(C): 36.8 (06-10-23 @ 22:30), Max: 36.8 (06-10-23 @ 22:30)  HR: 66 (06-10-23 @ 23:15) (66 - 74)  BP: 146/81 (06-10-23 @ 23:15) (128/81 - 157/80)  RR: 16 (06-10-23 @ 23:15) (16 - 18)  SpO2: 99% (06-10-23 @ 23:15) (97% - 100%)    GENERAL: NAD, lying in bed comfortably  HEAD:  Atraumatic, Normocephalic  EYES: EOMI, PERRLA, conjunctiva and sclera clear  ENT: Moist mucous membranes  NECK: Supple, No JVD  CHEST/LUNG: Clear to auscultation bilaterally; No rales, rhonchi, wheezing, or rubs. Unlabored respirations  HEART: Regular rate and rhythm; No murmurs, rubs, or gallops  ABDOMEN: BSx4; Soft, nontender, nondistended  EXTREMITIES:  2+ Peripheral Pulses, brisk capillary refill. No clubbing, cyanosis, or edema  NERVOUS SYSTEM:  A&Ox3, no focal deficits, 5/5 strength b/l in UE/LE, CN II - XII intact, still has some slurred speech but per girlfriend at bedside, overall improved.  SKIN: No rashes or lesions  psych: normal behavior, normal affect

## 2023-06-10 NOTE — ED ADULT NURSE NOTE - OBJECTIVE STATEMENT
JAYSON RN: pt received A&Ox4 c/o stroke like symptoms. last known normal 2000. first symptoms noted at 2020. pt girlfriend noted pt to have slurred speech, drooling and not responding to verbal commands. brought in by EMS with 18G to RAC. PMHx seizure disorder, dyslexia, and HTN. pt presents with moderate dysarthria, moderate aphasia and not responding appropriately to some questions. equal strength in B/L upper and lower extremities. pt has sensory neglect in R lower extremity. pt had a period of motor deficit in R upper and lower extremity, along with severe R eye twitching outside of CT exam that resolved in ~20 seconds. PEERLA noted. no facial droop noted. respirations even and unlabored. denies HA, weakness, c/p, SOB, N/VD, changes in vision, fevers at home. VS as noted in flowsheet. placed on continuous monitor. denies ETOH use tonight. awaiting results. safety maintained, side rails up. girlfriend at bedside

## 2023-06-10 NOTE — ED PROVIDER NOTE - PHYSICAL EXAMINATION
GENERAL: Awake, alert, NAD  HEENT: NC/AT, moist mucous membranes, PERRL, EOMI  LUNGS: CTAB, no wheezes or crackles   CARDIAC: RRR, no m/r/g  ABDOMEN: Soft, non tender, non distended, no rebound, no guarding  BACK: No midline spinal tenderness, no CVA tenderness  EXT: No edema, no calf tenderness, 2+ DP pulses bilaterally, no deformities.  NEURO: A&Ox2. Moving all extremities. On initial exam patient had no neuro focal neuro deficits, patient did have slurred speech. On rpt exam patient is unable to keep R arm and R leg lifted without drift. Patient stopped answering questions, but was responsive to pain.   SKIN: Warm and dry. No rash.  PSYCH: Normal affect.

## 2023-06-10 NOTE — H&P ADULT - ASSESSMENT
76 y/o M with PMH of HTN and seizure disorder BIBEMS after developing slurred speech, s/p code stroke, admitted to MICU for q1 neuro checks.      NEUROLOGY  #Slurred speech, s/p code stroke  Etiology possibly 2/2 ischemic stroke, pt improved s/p TPA at 22:48 PM on 6/10/23, however still has some residual slurred speech. Etiology also possibly 2/2 seizure. CT Head, CTA H/N remarkable for small area of prolonged Tmax at the left parieto-occipital junction is of uncertain clinical significance, possibly artifact.  -repeat CT Head in 24 hours (10 PM on 6/11/23)  -bedside dysphagia screen 6 hours after TPA  -start aspirin 81 mg daily if repeat CT Head negative for hemorrhage  -start atorvastatin 40 mg daily  -start lovenox for DVT PPx if repeat CT Head negative for hemorrhage, c/w SCDs for now  -f/u lipid panel and A1C  -ordered MRI BRain w/o con to eval for acute ischemic stroke  -f/u TTE, EKG, CXR  -consider ILR  -initial lactate 2.0, will recheck, check CK and prolactin  -monitor on telemetry  -Permissive HTN up to 180/105, cardene gtt PRN  -Aspiration, fall precautions  -PT/OT/SS      PULMONARY  #MARINO, satting well on RA    CARDIOVASCULAR  #MARINO, permissive HTN as above    RENAL  #MARINO    GASTROINTESTINAL  #MARINO    INFECTIOUS DISEASE  #MARINO    ENDOCRINE  #MARINO  -f/u A1C    HEME  #MARINO    FLUIDS/ELECTROLYTES/NUTRITION  # Diet: NPO for now pending bedside dysphagia      PROPHYLAXIS  # DVT: SCDs for 24 hours then lovenox if no hemorrhage      CODE STATUS: full code

## 2023-06-10 NOTE — ED PROVIDER NOTE - PROGRESS NOTE DETAILS
Maliha Kam, PGY-1 DO:  CT scan: Small area of prolonged Tmax at the left parieto-occipital junction is of uncertain clinical significance, possibly artifact. Patient symptoms of disorientation and aphasia persisting. Patient seen by Tele neuro and decision was made to give TNK. TNK give by neurology resident currently patient is stable. Patient will receive close monitoring and be admitted to MICU. Patient daughter informed and agreeable to plan.

## 2023-06-11 LAB
A1C WITH ESTIMATED AVERAGE GLUCOSE RESULT: 5.8 % — HIGH (ref 4–5.6)
ALBUMIN SERPL ELPH-MCNC: 3.9 G/DL — SIGNIFICANT CHANGE UP (ref 3.3–5)
ALP SERPL-CCNC: 34 U/L — LOW (ref 40–120)
ALT FLD-CCNC: 12 U/L — SIGNIFICANT CHANGE UP (ref 4–41)
ANION GAP SERPL CALC-SCNC: 12 MMOL/L — SIGNIFICANT CHANGE UP (ref 7–14)
APTT BLD: 24.1 SEC — LOW (ref 27–36.3)
AST SERPL-CCNC: 22 U/L — SIGNIFICANT CHANGE UP (ref 4–40)
BILIRUB SERPL-MCNC: 0.4 MG/DL — SIGNIFICANT CHANGE UP (ref 0.2–1.2)
BUN SERPL-MCNC: 19 MG/DL — SIGNIFICANT CHANGE UP (ref 7–23)
CALCIUM SERPL-MCNC: 9 MG/DL — SIGNIFICANT CHANGE UP (ref 8.4–10.5)
CHLORIDE SERPL-SCNC: 101 MMOL/L — SIGNIFICANT CHANGE UP (ref 98–107)
CHOLEST SERPL-MCNC: 131 MG/DL — SIGNIFICANT CHANGE UP
CK SERPL-CCNC: 114 U/L — SIGNIFICANT CHANGE UP (ref 30–200)
CO2 SERPL-SCNC: 21 MMOL/L — LOW (ref 22–31)
CREAT SERPL-MCNC: 0.96 MG/DL — SIGNIFICANT CHANGE UP (ref 0.5–1.3)
EGFR: 82 ML/MIN/1.73M2 — SIGNIFICANT CHANGE UP
ESTIMATED AVERAGE GLUCOSE: 120 — SIGNIFICANT CHANGE UP
GLUCOSE SERPL-MCNC: 132 MG/DL — HIGH (ref 70–99)
HCT VFR BLD CALC: 38.6 % — LOW (ref 39–50)
HDLC SERPL-MCNC: 44 MG/DL — SIGNIFICANT CHANGE UP
HGB BLD-MCNC: 12.6 G/DL — LOW (ref 13–17)
INR BLD: 1.02 RATIO — SIGNIFICANT CHANGE UP (ref 0.88–1.16)
LACTATE BLDV-MCNC: 2.3 MMOL/L — HIGH (ref 0.5–2)
LIPID PNL WITH DIRECT LDL SERPL: 77 MG/DL — SIGNIFICANT CHANGE UP
MCHC RBC-ENTMCNC: 29.6 PG — SIGNIFICANT CHANGE UP (ref 27–34)
MCHC RBC-ENTMCNC: 32.6 GM/DL — SIGNIFICANT CHANGE UP (ref 32–36)
MCV RBC AUTO: 90.8 FL — SIGNIFICANT CHANGE UP (ref 80–100)
NON HDL CHOLESTEROL: 87 MG/DL — SIGNIFICANT CHANGE UP
NRBC # BLD: 0 /100 WBCS — SIGNIFICANT CHANGE UP (ref 0–0)
NRBC # FLD: 0 K/UL — SIGNIFICANT CHANGE UP (ref 0–0)
PLATELET # BLD AUTO: 101 K/UL — LOW (ref 150–400)
POTASSIUM SERPL-MCNC: 4.8 MMOL/L — SIGNIFICANT CHANGE UP (ref 3.5–5.3)
POTASSIUM SERPL-SCNC: 4.8 MMOL/L — SIGNIFICANT CHANGE UP (ref 3.5–5.3)
PROLACTIN SERPL-MCNC: 12.4 NG/ML — SIGNIFICANT CHANGE UP (ref 4.1–18.4)
PROT SERPL-MCNC: 6.3 G/DL — SIGNIFICANT CHANGE UP (ref 6–8.3)
PROTHROM AB SERPL-ACNC: 11.8 SEC — SIGNIFICANT CHANGE UP (ref 10.5–13.4)
RBC # BLD: 4.25 M/UL — SIGNIFICANT CHANGE UP (ref 4.2–5.8)
RBC # FLD: 14.5 % — SIGNIFICANT CHANGE UP (ref 10.3–14.5)
SODIUM SERPL-SCNC: 134 MMOL/L — LOW (ref 135–145)
TRIGL SERPL-MCNC: 50 MG/DL — SIGNIFICANT CHANGE UP
TROPONIN T, HIGH SENSITIVITY RESULT: 22 NG/L — SIGNIFICANT CHANGE UP
WBC # BLD: 7.22 K/UL — SIGNIFICANT CHANGE UP (ref 3.8–10.5)
WBC # FLD AUTO: 7.22 K/UL — SIGNIFICANT CHANGE UP (ref 3.8–10.5)

## 2023-06-11 PROCEDURE — 70450 CT HEAD/BRAIN W/O DYE: CPT | Mod: 26

## 2023-06-11 PROCEDURE — 93306 TTE W/DOPPLER COMPLETE: CPT | Mod: 26

## 2023-06-11 PROCEDURE — 95720 EEG PHY/QHP EA INCR W/VEEG: CPT

## 2023-06-11 PROCEDURE — 99291 CRITICAL CARE FIRST HOUR: CPT

## 2023-06-11 PROCEDURE — 71045 X-RAY EXAM CHEST 1 VIEW: CPT | Mod: 26

## 2023-06-11 RX ORDER — ATORVASTATIN CALCIUM 80 MG/1
40 TABLET, FILM COATED ORAL AT BEDTIME
Refills: 0 | Status: DISCONTINUED | OUTPATIENT
Start: 2023-06-11 | End: 2023-06-14

## 2023-06-11 RX ORDER — DIVALPROEX SODIUM 500 MG/1
500 TABLET, DELAYED RELEASE ORAL
Refills: 0 | Status: DISCONTINUED | OUTPATIENT
Start: 2023-06-11 | End: 2023-06-14

## 2023-06-11 RX ORDER — ASPIRIN/CALCIUM CARB/MAGNESIUM 324 MG
81 TABLET ORAL DAILY
Refills: 0 | Status: DISCONTINUED | OUTPATIENT
Start: 2023-06-11 | End: 2023-06-14

## 2023-06-11 RX ORDER — ENOXAPARIN SODIUM 100 MG/ML
40 INJECTION SUBCUTANEOUS EVERY 24 HOURS
Refills: 0 | Status: DISCONTINUED | OUTPATIENT
Start: 2023-06-11 | End: 2023-06-14

## 2023-06-11 RX ORDER — VALPROIC ACID (AS SODIUM SALT) 250 MG/5ML
333 SOLUTION, ORAL ORAL EVERY 8 HOURS
Refills: 0 | Status: DISCONTINUED | OUTPATIENT
Start: 2023-06-11 | End: 2023-06-11

## 2023-06-11 RX ADMIN — Medication 81 MILLIGRAM(S): at 23:10

## 2023-06-11 RX ADMIN — ATORVASTATIN CALCIUM 40 MILLIGRAM(S): 80 TABLET, FILM COATED ORAL at 21:08

## 2023-06-11 RX ADMIN — Medication 55 MILLIGRAM(S): at 02:22

## 2023-06-11 RX ADMIN — DIVALPROEX SODIUM 500 MILLIGRAM(S): 500 TABLET, DELAYED RELEASE ORAL at 17:28

## 2023-06-11 NOTE — PROGRESS NOTE ADULT - ASSESSMENT
78 y/o M with PMH of HTN and seizure disorder BIBEMS after developing slurred speech, s/p code stroke, admitted to MICU for q1 neuro checks.      NEUROLOGY  #Slurred speech, s/p code stroke  Etiology possibly 2/2 ischemic stroke, pt improved s/p TPA at 22:48 PM on 6/10/23, however still has some residual slurred speech. Etiology also possibly 2/2 seizure. CT Head, CTA H/N remarkable for small area of prolonged Tmax at the left parieto-occipital junction is of uncertain clinical significance, possibly artifact.  -repeat CT Head in 24 hours (10 PM on 6/11/23)  -bedside dysphagia screen 6 hours after TPA  -start aspirin 81 mg daily if repeat CT Head negative for hemorrhage  -start atorvastatin 40 mg daily  -start lovenox for DVT PPx if repeat CT Head negative for hemorrhage, c/w SCDs for now  -f/u lipid panel and A1C  -ordered MRI [facility] w/o con to eval for acute ischemic stroke  -f/u TTE, EKG, CXR  -consider ILR  -initial lactate 2.0, will recheck, check CK and prolactin  -monitor on telemetry  -Permissive HTN up to 180/105, cardene gtt PRN  -Aspiration, fall precautions  -PT/OT/SS      PULMONARY  #MARINO, satting well on RA    CARDIOVASCULAR  #MARINO, permissive HTN as above    RENAL  #MARINO    GASTROINTESTINAL  #MARINO    INFECTIOUS DISEASE  #MARINO    ENDOCRINE  #MARINO  -f/u A1C    HEME  #MARINO    FLUIDS/ELECTROLYTES/NUTRITION  # Diet: NPO for now pending bedside dysphagia      PROPHYLAXIS  # DVT: SCDs for 24 hours then lovenox if no hemorrhage      CODE STATUS: full code 76 y/o M with PMH of HTN and seizure disorder BIBEMS after developing slurred speech, s/p code stroke, admitted to MICU for q1 neuro checks.      NEUROLOGY  #Slurred speech, s/p code stroke  Etiology possibly 2/2 ischemic stroke, pt improved s/p TPA at 22:48 PM on 6/10/23, however still has some residual slurred speech. Etiology also possibly 2/2 seizure. CT Head, CTA H/N remarkable for small area of prolonged Tmax at the left parieto-occipital junction is of uncertain clinical significance, possibly artifact.  -repeat CT Head at 24 hours (10 PM on 6/11/23)  -bedside dysphagia screen passed, diet resumed as below  -start aspirin 81 mg daily if repeat CT Head negative for hemorrhage  -start atorvastatin 40 mg daily  -start lovenox for DVT PPx if repeat CT Head negative for hemorrhage, c/w SCDs for now  -f/u lipid panel and A1C  -pending MRI Brain w/o con to eval for acute ischemic stroke, checklist performed  -TTE, EKG, CXR unremarkable  -consider ILR  -initial lactate 2.0, will recheck, f/u CK and prolactin  -monitor on telemetry  -Permissive HTN up to 180/105, cardene gtt PRN  -Aspiration, fall precautions  -continue PT/OT  -f/u 24h EEG      PULMONARY  #MARINO, satting well on RA    CARDIOVASCULAR  #MARINO, permissive HTN as above    RENAL  #MARINO    GASTROINTESTINAL  #MARINO    INFECTIOUS DISEASE  #MARINO    ENDOCRINE  #MARINO  -A1C 5.8    HEME  #MARINO    FLUIDS/ELECTROLYTES/NUTRITION  # Diet: passed dysphagia screen, dash diet      PROPHYLAXIS  # DVT: SCDs for 24 hours then lovenox if no hemorrhage      CODE STATUS: full code

## 2023-06-11 NOTE — OCCUPATIONAL THERAPY INITIAL EVALUATION ADULT - PERTINENT HX OF CURRENT PROBLEM, REHAB EVAL
Pt is an 76 y/o M with PMH of HTN and seizure disorder. Pt was admitted after developing slurred speech, s/p code stroke, admitted to MICU for q1 neuro checks.

## 2023-06-11 NOTE — PHYSICAL THERAPY INITIAL EVALUATION ADULT - PERTINENT HX OF CURRENT PROBLEM, REHAB EVAL
Pt is a 77 year old male admitted to the hospital after developing slurred speech, status post code stroke, admitted to MICU for q1 neuro checks.

## 2023-06-11 NOTE — OCCUPATIONAL THERAPY INITIAL EVALUATION ADULT - PREDICTED DURATION OF THERAPY (DAYS/WKS), OT EVAL
Skilled OT services are not needed at this time. Pt is performing at baseline with ADLs and functional mobility.

## 2023-06-11 NOTE — PROGRESS NOTE ADULT - SUBJECTIVE AND OBJECTIVE BOX
Leonardo Weber MD  Emergency Medicine PGY 1      MICU PROGRESS NOTE     OVERNIGHT EVENTS:    SUBJECTIVE: Patient seen and examined at bedside. Patient denies fever, chills, SOB, chest pain, N/V/D.    OBJECTIVE:    VITAL SIGNS:  ICU Vital Signs Last 24 Hrs  T(C): 37.1 (11 Jun 2023 04:00), Max: 37.1 (10 Onur 2023 23:36)  T(F): 98.7 (11 Jun 2023 04:00), Max: 98.8 (10 Onur 2023 23:36)  HR: 68 (11 Jun 2023 06:30) (61 - 74)  BP: 113/68 (11 Jun 2023 06:30) (113/68 - 157/80)  BP(mean): 80 (11 Jun 2023 06:30) (73 - 100)  ABP: --  ABP(mean): --  RR: 23 (11 Jun 2023 06:30) (15 - 25)  SpO2: 97% (11 Jun 2023 06:30) (96% - 100%)    O2 Parameters below as of 11 Jun 2023 06:00  Patient On (Oxygen Delivery Method): room air              06-10 @ 07:01  -  06-11 @ 07:00  --------------------------------------------------------  IN: 100 mL / OUT: 650 mL / NET: -550 mL        =================PHYSICAL EXAM=================    GENERAL: Laying comfortably, NAD  EYES: EOMI, PERRL, no scleral icterus  NECK: No JVD  LUNG: Clear to auscultation bilaterally; No wheeze, crackles or rhonci  HEART: Regular rate and rhythm; No murmurs, rubs, or gallops  ABDOMEN: Soft, Nontender, Nondistended  EXTREMITIES:  No LE edema, 2+ Peripheral Pulses, No clubbing, cyanosis, or edema  PSYCH: AAOx3  NEUROLOGY: non-focal, strength 5/5 in all extremities, sensation intact  SKIN: No rashes or lesions    =================================================    LABS:                        12.6   7.22  )-----------( 101      ( 11 Jun 2023 03:00 )             38.6     Auto Eosinophil # x     / Auto Eosinophil % x     / Auto Neutrophil # x     / Auto Neutrophil % x     / BANDS % x                            13.2   5.70  )-----------( 111      ( 10 Onur 2023 21:30 )             40.3     Auto Eosinophil # 0.09  / Auto Eosinophil % 1.6   / Auto Neutrophil # 2.57  / Auto Neutrophil % 45.1  / BANDS % x        06-11    134<L>  |  101  |  19  ----------------------------<  132<H>  4.8   |  21<L>  |  0.96  06-10    135  |  98  |  16  ----------------------------<  121<H>  4.4   |  26  |  1.12    Ca    9.0      11 Jun 2023 03:00  TPro  6.3  /  Alb  3.9  /  TBili  0.4  /  DBili  x   /  AST  22  /  ALT  12  /  AlkPhos  34<L>  06-11  TPro  6.8  /  Alb  4.2  /  TBili  0.4  /  DBili  x   /  AST  24  /  ALT  12  /  AlkPhos  37<L>  06-10    PT/INR - ( 11 Jun 2023 03:00 )   PT: 11.8 sec;   INR: 1.02 ratio         PTT - ( 11 Jun 2023 03:00 )  PTT:24.1 sec  CARDIAC MARKERS ( 11 Jun 2023 03:00 )  x     / x     / 114 U/L / x     / x                     MEDICATIONS:  MEDICATIONS  (STANDING):  atorvastatin 40 milliGRAM(s) Oral at bedtime  chlorhexidine 2% Cloths 1 Application(s) Topical <User Schedule>  valproate sodium  IVPB 500 milliGRAM(s) IV Intermittent <User Schedule>    MEDICATIONS  (PRN):      ALLERGIES:  Allergies    No Known Allergies    Intolerances        LABS:                        12.6   7.22  )-----------( 101      ( 11 Jun 2023 03:00 )             38.6     06-11    134<L>  |  101  |  19  ----------------------------<  132<H>  4.8   |  21<L>  |  0.96    Ca    9.0      11 Jun 2023 03:00    TPro  6.3  /  Alb  3.9  /  TBili  0.4  /  DBili  x   /  AST  22  /  ALT  12  /  AlkPhos  34<L>  06-11    PT/INR - ( 11 Jun 2023 03:00 )   PT: 11.8 sec;   INR: 1.02 ratio         PTT - ( 11 Jun 2023 03:00 )  PTT:24.1 sec      CAPILLARY BLOOD GLUCOSE      POCT Blood Glucose.: 114 mg/dL (10 Onur 2023 21:23)      RADIOLOGY & ADDITIONAL TESTS: Reviewed. Leonardo Weber MD  Emergency Medicine PGY 1      MICU PROGRESS NOTE     OVERNIGHT EVENTS: admitted to MICU for neuro checks    SUBJECTIVE: Patient seen and examined at bedside. Feels well this morning without complaint. Patient denies fever, chills, SOB, chest pain, N/V/D.    OBJECTIVE:    VITAL SIGNS:  ICU Vital Signs Last 24 Hrs  T(C): 37.1 (11 Jun 2023 04:00), Max: 37.1 (10 Onur 2023 23:36)  T(F): 98.7 (11 Jun 2023 04:00), Max: 98.8 (10 Onur 2023 23:36)  HR: 68 (11 Jun 2023 06:30) (61 - 74)  BP: 113/68 (11 Jun 2023 06:30) (113/68 - 157/80)  BP(mean): 80 (11 Jun 2023 06:30) (73 - 100)  ABP: --  ABP(mean): --  RR: 23 (11 Jun 2023 06:30) (15 - 25)  SpO2: 97% (11 Jun 2023 06:30) (96% - 100%)    O2 Parameters below as of 11 Jun 2023 06:00  Patient On (Oxygen Delivery Method): room air              06-10 @ 07:01  -  06-11 @ 07:00  --------------------------------------------------------  IN: 100 mL / OUT: 650 mL / NET: -550 mL        =================PHYSICAL EXAM=================    CONSTITUTIONAL: Well-developed; well-nourished; in no acute distress.   SKIN: warm, dry  HEAD: Normocephalic; atraumatic.  EYES: no conjunctival injection. PERRL. EOMI without pain. no scleral icterus  ENT: No nasal discharge; airway clear.  NECK: Supple; non tender.  CARD: S1, S2 normal; no murmurs, gallops, or rubs. Regular rate and rhythm.   RESP: No wheezes, rales or rhonchi. Good air movement bilaterally.   ABD: soft ntnd, no guarding, no distention, no rigidity.   EXT: No cyanosis or edema.   NEURO: alert, oriented to ppte, cn 2-12 intact, motor strength 5/5 throughout, sensation intact throughout, coordination intact, romberg wnl, gait normal   PSYCH: Cooperative, appropriate.     =================================================    LABS:                        12.6   7.22  )-----------( 101      ( 11 Jun 2023 03:00 )             38.6     Auto Eosinophil # x     / Auto Eosinophil % x     / Auto Neutrophil # x     / Auto Neutrophil % x     / BANDS % x                            13.2   5.70  )-----------( 111      ( 10 Onur 2023 21:30 )             40.3     Auto Eosinophil # 0.09  / Auto Eosinophil % 1.6   / Auto Neutrophil # 2.57  / Auto Neutrophil % 45.1  / BANDS % x        06-11    134<L>  |  101  |  19  ----------------------------<  132<H>  4.8   |  21<L>  |  0.96  06-10    135  |  98  |  16  ----------------------------<  121<H>  4.4   |  26  |  1.12    Ca    9.0      11 Jun 2023 03:00  TPro  6.3  /  Alb  3.9  /  TBili  0.4  /  DBili  x   /  AST  22  /  ALT  12  /  AlkPhos  34<L>  06-11  TPro  6.8  /  Alb  4.2  /  TBili  0.4  /  DBili  x   /  AST  24  /  ALT  12  /  AlkPhos  37<L>  06-10    PT/INR - ( 11 Jun 2023 03:00 )   PT: 11.8 sec;   INR: 1.02 ratio         PTT - ( 11 Jun 2023 03:00 )  PTT:24.1 sec  CARDIAC MARKERS ( 11 Jun 2023 03:00 )  x     / x     / 114 U/L / x     / x                     MEDICATIONS:  MEDICATIONS  (STANDING):  atorvastatin 40 milliGRAM(s) Oral at bedtime  chlorhexidine 2% Cloths 1 Application(s) Topical <User Schedule>  valproate sodium  IVPB 500 milliGRAM(s) IV Intermittent <User Schedule>    MEDICATIONS  (PRN):      ALLERGIES:  Allergies    No Known Allergies    Intolerances        LABS:                        12.6   7.22  )-----------( 101      ( 11 Jun 2023 03:00 )             38.6     06-11    134<L>  |  101  |  19  ----------------------------<  132<H>  4.8   |  21<L>  |  0.96    Ca    9.0      11 Jun 2023 03:00    TPro  6.3  /  Alb  3.9  /  TBili  0.4  /  DBili  x   /  AST  22  /  ALT  12  /  AlkPhos  34<L>  06-11    PT/INR - ( 11 Jun 2023 03:00 )   PT: 11.8 sec;   INR: 1.02 ratio         PTT - ( 11 Jun 2023 03:00 )  PTT:24.1 sec      CAPILLARY BLOOD GLUCOSE      POCT Blood Glucose.: 114 mg/dL (10 Onur 2023 21:23)      RADIOLOGY & ADDITIONAL TESTS: Reviewed.

## 2023-06-11 NOTE — CHART NOTE - NSCHARTNOTEFT_GEN_A_CORE
MICU Transfer Note  ---------------------------    Transfer from: MICU  Transfer to:  (  ) Medicine    ( x ) Telemetry    (  ) RCU    (  ) Palliative    (  ) Stroke Unit    (  ) _______________  Accepting Physician:      MICU COURSE  78 y/o M with PMH of HTN and seizure disorder BIBEMS after developing slurred speech last night. Pt's girlfriend at bedside, pt does not remember episode, however girlfriend reports that she was watching a TV show with him about aliens and pt suddenly developed slurred speech, made gasping sounds and raspy noises, so girlfriend became concerned and called 911. They told her to leave him on the couch, so she waited until EMS arrived and took him to the ED. She did not note any rhythmic jerking or tonic clonic movements, reports he was recently hospitalized for a pneumonia but does not remember for what exactly, reports his last seizure was in 2020. Denied bowel/bladder incontinence, fever, chills, CP, SOB, nausea, vomiting, diarrhea, constipation, dysuria, hematuria, hematochezia, melena.    Per chart, pt was apparently not able to identify objects as per EMS, last known normal offiically 8 PM on 6/10/23, had R > L LE drift in ED during code stroke. Pt's daughter Chasity mentioned pt has had stuttering since childhood, but per girlfriend at bedside, this was different than usual. Girlfriend reported improvement after TPA administration.     Pt's seizure history: sudden episodes of impaired awareness preceded by apneic gasps. Had EEG 10/25/20 showing left temporal sharp waves, two subtle left hemispheric electrographic seizures evolving over temporal region.    Pt allergic to keppra.    Admitted to MICU for neuro checks. Pt while here obtained 24h EEG and 24h CTH stability scan, followed by neuro. Had no worsening symptoms or new acute neurologic deficits. Following results of imaging, stable for management on floors.      OBJECTIVE --  Vital Signs Last 24 Hrs  T(C): 37.1 (11 Jun 2023 16:00), Max: 37.1 (10 Onur 2023 23:36)  T(F): 98.7 (11 Jun 2023 16:00), Max: 98.8 (10 Onur 2023 23:36)  HR: 63 (11 Jun 2023 19:00) (61 - 87)  BP: 110/62 (11 Jun 2023 19:00) (99/63 - 157/80)  BP(mean): 74 (11 Jun 2023 19:00) (65 - 100)  RR: 19 (11 Jun 2023 19:00) (15 - 25)  SpO2: 98% (11 Jun 2023 19:00) (96% - 100%)    Parameters below as of 11 Jun 2023 08:00  Patient On (Oxygen Delivery Method): room air        I&O's Summary    10 Onur 2023 07:01  -  11 Jun 2023 07:00  --------------------------------------------------------  IN: 100 mL / OUT: 650 mL / NET: -550 mL    11 Jun 2023 07:01  -  11 Jun 2023 19:44  --------------------------------------------------------  IN: 600 mL / OUT: 1800 mL / NET: -1200 mL        MEDICATIONS  (STANDING):  atorvastatin 40 milliGRAM(s) Oral at bedtime  chlorhexidine 2% Cloths 1 Application(s) Topical <User Schedule>  divalproex  milliGRAM(s) Oral <User Schedule>    MEDICATIONS  (PRN):        LABS                                            12.6                  Neurophils% (auto):   x      (06-11 @ 03:00):    7.22 )-----------(101          Lymphocytes% (auto):  x                                             38.6                   Eosinphils% (auto):   x        Manual%: Neutrophils x    ; Lymphocytes x    ; Eosinophils x    ; Bands%: x    ; Blasts x                                    134    |  101    |  19                  Calcium: 9.0   / iCa: x      (06-11 @ 03:00)    ----------------------------<  132       Magnesium: x                                4.8     |  21     |  0.96             Phosphorous: x        TPro  6.3    /  Alb  3.9    /  TBili  0.4    /  DBili  x      /  AST  22     /  ALT  12     /  AlkPhos  34     11 Jun 2023 03:00    ( 06-11 @ 03:00 )   PT: 11.8 sec;   INR: 1.02 ratio  aPTT: 24.1 sec          ASSESSMENT & PLAN:   78 y/o M with PMH of HTN and seizure disorder BIBEMS after developing slurred speech, s/p code stroke, admitted to MICU for q1 neuro checks.      NEUROLOGY  #Slurred speech, s/p code stroke  Etiology possibly 2/2 ischemic stroke, pt improved s/p TPA at 22:48 PM on 6/10/23, however still has some residual slurred speech. Etiology also possibly 2/2 seizure. CT Head, CTA H/N remarkable for small area of prolonged Tmax at the left parieto-occipital junction is of uncertain clinical significance, possibly artifact.  -repeat CT Head in 24 hours (10 PM on 6/11/23)  -bedside dysphagia screen 6 hours after TPA passed, diet restarted  -start aspirin 81 mg daily if repeat CT Head negative for hemorrhage  -start atorvastatin 40 mg daily  -start lovenox for DVT PPx if repeat CT Head negative for hemorrhage, c/w SCDs for now  -f/u lipid panel and A1C  -ordered MRI [facility] w/o con to eval for acute ischemic stroke  -f/u TTE, EKG, CXR  -consider ILR  -initial lactate 2.0, will recheck, check CK and prolactin  -monitor on telemetry  -Permissive HTN up to 180/105, cardene gtt PRN  -Aspiration, fall precautions  -PT/OT/SS      PULMONARY  #MARINO, satting well on RA    CARDIOVASCULAR  #MARINO, permissive HTN as above    RENAL  #MARINO    GASTROINTESTINAL  #MARINO    INFECTIOUS DISEASE  #MARINO    ENDOCRINE  #MARINO  -f/u A1C    HEME  #MARINO    FLUIDS/ELECTROLYTES/NUTRITION  # Diet: dash      PROPHYLAXIS  # DVT: SCDs for 24 hours then lovenox if no hemorrhage      CODE STATUS: full code        For Follow-Up:  [ ] f/u neuro recs  [ ] f/u MRI Brain  [ ] Can restart dvt ppx and asa if CTH neg MICU Transfer Note  ---------------------------    Transfer from: MICU  Transfer to:  (  ) Medicine    ( x ) Telemetry    (  ) RCU    (  ) Palliative    (  ) Stroke Unit    (  ) _______________  Accepting Physician:      MICU COURSE  76 y/o M with PMH of HTN and seizure disorder BIBEMS after developing slurred speech last night. Pt's girlfriend at bedside, pt does not remember episode, however girlfriend reports that she was watching a TV show with him about aliens and pt suddenly developed slurred speech, made gasping sounds and raspy noises, so girlfriend became concerned and called 911. They told her to leave him on the couch, so she waited until EMS arrived and took him to the ED. She did not note any rhythmic jerking or tonic clonic movements, reports he was recently hospitalized for a pneumonia but does not remember for what exactly, reports his last seizure was in 2020. Denied bowel/bladder incontinence, fever, chills, CP, SOB, nausea, vomiting, diarrhea, constipation, dysuria, hematuria, hematochezia, melena.    Per chart, pt was apparently not able to identify objects as per EMS, last known normal offiically 8 PM on 6/10/23, had R > L LE drift in ED during code stroke. Pt's daughter Chasity mentioned pt has had stuttering since childhood, but per girlfriend at bedside, this was different than usual. Girlfriend reported improvement after TPA administration.     Pt's seizure history: sudden episodes of impaired awareness preceded by apneic gasps. Had EEG 10/25/20 showing left temporal sharp waves, two subtle left hemispheric electrographic seizures evolving over temporal region.    Pt allergic to keppra.    Admitted to MICU for neuro checks. States he has had significant improvement to speech. Pt while here obtained 24h EEG and 24h CTH stability scan, followed by neuro. CTH stable and neg. vEEG interrupted for CTH after approx 12h. Lovenox and ASA resumed. Had no worsening symptoms or new acute neurologic deficits. Following results of imaging, stable for management on floors.      OBJECTIVE --  Vital Signs Last 24 Hrs  T(C): 37.1 (11 Jun 2023 16:00), Max: 37.1 (10 Onur 2023 23:36)  T(F): 98.7 (11 Jun 2023 16:00), Max: 98.8 (10 Onur 2023 23:36)  HR: 63 (11 Jun 2023 19:00) (61 - 87)  BP: 110/62 (11 Jun 2023 19:00) (99/63 - 157/80)  BP(mean): 74 (11 Jun 2023 19:00) (65 - 100)  RR: 19 (11 Jun 2023 19:00) (15 - 25)  SpO2: 98% (11 Jun 2023 19:00) (96% - 100%)    Parameters below as of 11 Jun 2023 08:00  Patient On (Oxygen Delivery Method): room air        I&O's Summary    10 Onur 2023 07:01  -  11 Jun 2023 07:00  --------------------------------------------------------  IN: 100 mL / OUT: 650 mL / NET: -550 mL    11 Jun 2023 07:01  -  11 Jun 2023 19:44  --------------------------------------------------------  IN: 600 mL / OUT: 1800 mL / NET: -1200 mL        MEDICATIONS  (STANDING):  atorvastatin 40 milliGRAM(s) Oral at bedtime  chlorhexidine 2% Cloths 1 Application(s) Topical <User Schedule>  divalproex  milliGRAM(s) Oral <User Schedule>    MEDICATIONS  (PRN):        LABS                                            12.6                  Neurophils% (auto):   x      (06-11 @ 03:00):    7.22 )-----------(101          Lymphocytes% (auto):  x                                             38.6                   Eosinphils% (auto):   x        Manual%: Neutrophils x    ; Lymphocytes x    ; Eosinophils x    ; Bands%: x    ; Blasts x                                    134    |  101    |  19                  Calcium: 9.0   / iCa: x      (06-11 @ 03:00)    ----------------------------<  132       Magnesium: x                                4.8     |  21     |  0.96             Phosphorous: x        TPro  6.3    /  Alb  3.9    /  TBili  0.4    /  DBili  x      /  AST  22     /  ALT  12     /  AlkPhos  34     11 Jun 2023 03:00    ( 06-11 @ 03:00 )   PT: 11.8 sec;   INR: 1.02 ratio  aPTT: 24.1 sec          ASSESSMENT & PLAN:   76 y/o M with PMH of HTN and seizure disorder BIBEMS after developing slurred speech, s/p code stroke, admitted to MICU for q1 neuro checks.      NEUROLOGY  #Slurred speech, s/p code stroke  Etiology possibly 2/2 ischemic stroke, pt improved s/p TPA at 22:48 PM on 6/10/23, however still has some residual slurred speech. Etiology also possibly 2/2 seizure. CT Head, CTA H/N remarkable for small area of prolonged Tmax at the left parieto-occipital junction is of uncertain clinical significance, possibly artifact.  -repeat CT Head at 24 hours negative  -bedside dysphagia screen passed, diet resumed as below  - 24h stability cth neg, ok to start asa and lovenox as below  -continue atorvastatin 40 mg daily  -pending MRI Brain w/o con to eval for acute ischemic stroke, checklist performed  -TTE, EKG, CXR unremarkable though TTE without bubble, will d/w neuro to see if repeat needed for bubble  -monitor on telemetry  -Permissive HTN up to 180/105, cardene gtt PRN  -Aspiration, fall precautions  -continue PT/OT  -24h EEG needs to be resumed, paused and removed for cth, to have leads replaced today      PULMONARY  #MARINO, satting well on RA    CARDIOVASCULAR  #MARINO  - permissive HTN as above  - ASA 81 daily  - F/u with neuro if TTE with bubble needed or if current TTE sufficient    RENAL  #MARINO    GASTROINTESTINAL  #MARINO  - Diet: dash, passed dysphagia screen    INFECTIOUS DISEASE  #MARINO    ENDOCRINE  #MARINO  -A1C 5.8    HEME  #MARINO  - Lovenox dvt ppx    FLUIDS/ELECTROLYTES/NUTRITION  # Diet: passed dysphagia screen, dash diet      PROPHYLAXIS  # DVT: lovenox      CODE STATUS: full code          For Follow-Up:  [ ] f/u neuro recs, ask if bubble needed or current TTE sufficient  [ ] f/u MRI Brain  [ ] resume vEEG MICU Transfer Note  ---------------------------    Transfer from: MICU  Transfer to:  (  ) Medicine    ( x ) Telemetry    (  ) RCU    (  ) Palliative    (  ) Stroke Unit    (  ) _______________  Accepting Physician:      MICU COURSE  78 y/o M with PMH of HTN and seizure disorder BIBEMS after developing slurred speech last night. Pt's girlfriend at bedside, pt does not remember episode, however girlfriend reports that she was watching a TV show with him about aliens and pt suddenly developed slurred speech, made gasping sounds and raspy noises, so girlfriend became concerned and called 911. They told her to leave him on the couch, so she waited until EMS arrived and took him to the ED. She did not note any rhythmic jerking or tonic clonic movements, reports he was recently hospitalized for a pneumonia but does not remember for what exactly, reports his last seizure was in 2020. Denied bowel/bladder incontinence, fever, chills, CP, SOB, nausea, vomiting, diarrhea, constipation, dysuria, hematuria, hematochezia, melena.    Per chart, pt was apparently not able to identify objects as per EMS, last known normal offiically 8 PM on 6/10/23, had R > L LE drift in ED during code stroke. Pt's daughter Chasity mentioned pt has had stuttering since childhood, but per girlfriend at bedside, this was different than usual. Girlfriend reported improvement after TPA administration.     Pt's seizure history: sudden episodes of impaired awareness preceded by apneic gasps. Had EEG 10/25/20 showing left temporal sharp waves, two subtle left hemispheric electrographic seizures evolving over temporal region.    Pt allergic to keppra.    Admitted to MICU for neuro checks. States he has had significant improvement to speech. Pt while here obtained 24h EEG and 24h CTH stability scan, followed by neuro. CTH stable and neg. vEEG interrupted for CTH after approx 12h. Lovenox and ASA resumed. Had no worsening symptoms or new acute neurologic deficits. Following results of imaging, stable for management on floors.      OBJECTIVE --  Vital Signs Last 24 Hrs  T(C): 37.1 (11 Jun 2023 16:00), Max: 37.1 (10 Onur 2023 23:36)  T(F): 98.7 (11 Jun 2023 16:00), Max: 98.8 (10 Onur 2023 23:36)  HR: 63 (11 Jun 2023 19:00) (61 - 87)  BP: 110/62 (11 Jun 2023 19:00) (99/63 - 157/80)  BP(mean): 74 (11 Jun 2023 19:00) (65 - 100)  RR: 19 (11 Jun 2023 19:00) (15 - 25)  SpO2: 98% (11 Jun 2023 19:00) (96% - 100%)    Parameters below as of 11 Jun 2023 08:00  Patient On (Oxygen Delivery Method): room air        I&O's Summary    10 Onur 2023 07:01  -  11 Jun 2023 07:00  --------------------------------------------------------  IN: 100 mL / OUT: 650 mL / NET: -550 mL    11 Jun 2023 07:01  -  11 Jun 2023 19:44  --------------------------------------------------------  IN: 600 mL / OUT: 1800 mL / NET: -1200 mL        MEDICATIONS  (STANDING):  atorvastatin 40 milliGRAM(s) Oral at bedtime  chlorhexidine 2% Cloths 1 Application(s) Topical <User Schedule>  divalproex  milliGRAM(s) Oral <User Schedule>    MEDICATIONS  (PRN):        LABS                                            12.6                  Neurophils% (auto):   x      (06-11 @ 03:00):    7.22 )-----------(101          Lymphocytes% (auto):  x                                             38.6                   Eosinphils% (auto):   x        Manual%: Neutrophils x    ; Lymphocytes x    ; Eosinophils x    ; Bands%: x    ; Blasts x                                    134    |  101    |  19                  Calcium: 9.0   / iCa: x      (06-11 @ 03:00)    ----------------------------<  132       Magnesium: x                                4.8     |  21     |  0.96             Phosphorous: x        TPro  6.3    /  Alb  3.9    /  TBili  0.4    /  DBili  x      /  AST  22     /  ALT  12     /  AlkPhos  34     11 Jun 2023 03:00    ( 06-11 @ 03:00 )   PT: 11.8 sec;   INR: 1.02 ratio  aPTT: 24.1 sec          ASSESSMENT & PLAN:   78 y/o M with PMH of HTN and seizure disorder BIBEMS after developing slurred speech, s/p code stroke, admitted to MICU for q1 neuro checks.      NEUROLOGY  #Slurred speech, s/p code stroke  Etiology possibly 2/2 ischemic stroke, pt improved s/p TPA at 22:48 PM on 6/10/23, however still has some residual slurred speech. Etiology also possibly 2/2 seizure. CT Head, CTA H/N remarkable for small area of prolonged Tmax at the left parieto-occipital junction is of uncertain clinical significance, possibly artifact.  -repeat CT Head at 24 hours negative  -bedside dysphagia screen passed, diet resumed as below  - 24h stability cth neg, ok to start asa and lovenox as below  -continue atorvastatin 40 mg daily  -pending MRI Brain w/o con to eval for acute ischemic stroke, checklist performed  -TTE, EKG, CXR unremarkable  -monitor on telemetry  -Permissive HTN up to 180/105, cardene gtt PRN  -Aspiration, fall precautions  -continue PT/OT  -24h EEG needs to be resumed, paused and removed for cth, to have leads replaced today      PULMONARY  #MARINO, satting well on RA    CARDIOVASCULAR  #MARINO  - permissive HTN as above  - ASA 81 daily  - TTE unremarkable    RENAL  #MARINO    GASTROINTESTINAL  #MARINO  - Diet: dash, passed dysphagia screen    INFECTIOUS DISEASE  #MARINO    ENDOCRINE  #MARINO  -A1C 5.8    HEME  #MARINO  - Lovenox dvt ppx    FLUIDS/ELECTROLYTES/NUTRITION  # Diet: passed dysphagia screen, dash diet      PROPHYLAXIS  # DVT: lovenox      CODE STATUS: full code          For Follow-Up:  [ ] f/u neuro recs  [ ] f/u MRI Brain  [ ] resume vEEG MICU Transfer Note  ---------------------------    Transfer from: MICU  Transfer to:  (  ) Medicine    ( x ) Telemetry    (  ) RCU    (  ) Palliative    (  ) Stroke Unit    (  ) _______________  Accepting Physician:      MICU COURSE  76 y/o M with PMH of HTN and seizure disorder BIBEMS after developing slurred speech last night. Pt's girlfriend at bedside, pt does not remember episode, however girlfriend reports that she was watching a TV show with him about aliens and pt suddenly developed slurred speech, made gasping sounds and raspy noises, so girlfriend became concerned and called 911. They told her to leave him on the couch, so she waited until EMS arrived and took him to the ED. She did not note any rhythmic jerking or tonic clonic movements, reports he was recently hospitalized for a pneumonia but does not remember for what exactly, reports his last seizure was in 2020. Denied bowel/bladder incontinence, fever, chills, CP, SOB, nausea, vomiting, diarrhea, constipation, dysuria, hematuria, hematochezia, melena.    Per chart, pt was apparently not able to identify objects as per EMS, last known normal offiically 8 PM on 6/10/23, had R > L LE drift in ED during code stroke. Pt's daughter Chasity mentioned pt has had stuttering since childhood, but per girlfriend at bedside, this was different than usual. Girlfriend reported improvement after TPA administration.     Pt's seizure history: sudden episodes of impaired awareness preceded by apneic gasps. Had EEG 10/25/20 showing left temporal sharp waves, two subtle left hemispheric electrographic seizures evolving over temporal region.    Pt allergic to keppra.    Admitted to MICU for neuro checks. States he has had significant improvement to speech. Pt while here obtained 24h EEG and 24h CTH stability scan, followed by neuro. CTH stable and neg. vEEG interrupted for CTH after approx 12h. Lovenox and ASA resumed. Had no worsening symptoms or new acute neurologic deficits. Following results of imaging, stable for management on floors.      OBJECTIVE --  Vital Signs Last 24 Hrs  T(C): 37.1 (11 Jun 2023 16:00), Max: 37.1 (10 Onur 2023 23:36)  T(F): 98.7 (11 Jun 2023 16:00), Max: 98.8 (10 Onur 2023 23:36)  HR: 63 (11 Jun 2023 19:00) (61 - 87)  BP: 110/62 (11 Jun 2023 19:00) (99/63 - 157/80)  BP(mean): 74 (11 Jun 2023 19:00) (65 - 100)  RR: 19 (11 Jun 2023 19:00) (15 - 25)  SpO2: 98% (11 Jun 2023 19:00) (96% - 100%)    Parameters below as of 11 Jun 2023 08:00  Patient On (Oxygen Delivery Method): room air        I&O's Summary    10 Onur 2023 07:01  -  11 Jun 2023 07:00  --------------------------------------------------------  IN: 100 mL / OUT: 650 mL / NET: -550 mL    11 Jun 2023 07:01  -  11 Jun 2023 19:44  --------------------------------------------------------  IN: 600 mL / OUT: 1800 mL / NET: -1200 mL        MEDICATIONS  (STANDING):  atorvastatin 40 milliGRAM(s) Oral at bedtime  chlorhexidine 2% Cloths 1 Application(s) Topical <User Schedule>  divalproex  milliGRAM(s) Oral <User Schedule>    MEDICATIONS  (PRN):        LABS                                            12.6                  Neurophils% (auto):   x      (06-11 @ 03:00):    7.22 )-----------(101          Lymphocytes% (auto):  x                                             38.6                   Eosinphils% (auto):   x        Manual%: Neutrophils x    ; Lymphocytes x    ; Eosinophils x    ; Bands%: x    ; Blasts x                                    134    |  101    |  19                  Calcium: 9.0   / iCa: x      (06-11 @ 03:00)    ----------------------------<  132       Magnesium: x                                4.8     |  21     |  0.96             Phosphorous: x        TPro  6.3    /  Alb  3.9    /  TBili  0.4    /  DBili  x      /  AST  22     /  ALT  12     /  AlkPhos  34     11 Jun 2023 03:00    ( 06-11 @ 03:00 )   PT: 11.8 sec;   INR: 1.02 ratio  aPTT: 24.1 sec          ASSESSMENT & PLAN:   76 y/o M with PMH of HTN and seizure disorder BIBEMS after developing slurred speech, s/p code stroke, admitted to MICU for q1 neuro checks.      NEUROLOGY  #Slurred speech, s/p code stroke  Etiology possibly 2/2 ischemic stroke, pt improved s/p TPA at 22:48 PM on 6/10/23, however still has some residual slurred speech. Etiology also possibly 2/2 seizure. CT Head, CTA H/N remarkable for small area of prolonged Tmax at the left parieto-occipital junction is of uncertain clinical significance, possibly artifact.  -repeat CT Head at 24 hours negative  -bedside dysphagia screen passed, diet resumed as below  - 24h stability cth neg, ok to start asa and lovenox as below  -continue atorvastatin 40 mg daily  -pending MRI Brain w/o con to eval for acute ischemic stroke, checklist performed  -TTE, EKG, CXR unremarkable  -monitor on telemetry  -Permissive HTN up to 180/105, cardene gtt PRN  -Aspiration, fall precautions  -continue PT/OT  -24h EEG needs to be resumed, paused and removed for cth, to have leads replaced today      PULMONARY  #MARINO, satting well on RA    CARDIOVASCULAR  #MARINO  - permissive HTN as above  - ASA 81 daily  - TTE unremarkable    RENAL  #MARINO    GASTROINTESTINAL  #MARINO  - Diet: dash, passed dysphagia screen    INFECTIOUS DISEASE  #MARINO    ENDOCRINE  #MARINO  -A1C 5.8    HEME  #MARINO  - Lovenox dvt ppx    FLUIDS/ELECTROLYTES/NUTRITION  # Diet: passed dysphagia screen, dash diet      PROPHYLAXIS  # DVT: lovenox      CODE STATUS: full code          For Follow-Up:  [ ] f/u neuro recs  [ ] f/u MRI Brain today  [ ] resume vEEG after MRI today MICU Transfer Note  ---------------------------    Transfer from: MICU  Transfer to:  (  ) Medicine    ( x ) Telemetry    (  ) RCU    (  ) Palliative    (  ) Stroke Unit    (  ) _______________  Accepting Physician: Gavino Wharton      MICU COURSE  78 y/o M with PMH of HTN and seizure disorder BIBEMS after developing slurred speech last night. Pt's girlfriend at bedside, pt does not remember episode, however girlfriend reports that she was watching a TV show with him about aliens and pt suddenly developed slurred speech, made gasping sounds and raspy noises, so girlfriend became concerned and called 911. They told her to leave him on the couch, so she waited until EMS arrived and took him to the ED. She did not note any rhythmic jerking or tonic clonic movements, reports he was recently hospitalized for a pneumonia but does not remember for what exactly, reports his last seizure was in 2020. Denied bowel/bladder incontinence, fever, chills, CP, SOB, nausea, vomiting, diarrhea, constipation, dysuria, hematuria, hematochezia, melena.    Per chart, pt was apparently not able to identify objects as per EMS, last known normal offiically 8 PM on 6/10/23, had R > L LE drift in ED during code stroke. Pt's daughter Chasity mentioned pt has had stuttering since childhood, but per girlfriend at bedside, this was different than usual. Girlfriend reported improvement after TPA administration.     Pt's seizure history: sudden episodes of impaired awareness preceded by apneic gasps. Had EEG 10/25/20 showing left temporal sharp waves, two subtle left hemispheric electrographic seizures evolving over temporal region.    Pt allergic to keppra.    Admitted to MICU for neuro checks. States he has had significant improvement to speech. Pt while here obtained 24h EEG and 24h CTH stability scan, followed by neuro. CTH stable and neg. vEEG interrupted for CTH after approx 12h. Lovenox and ASA resumed. Had no worsening symptoms or new acute neurologic deficits. Following results of imaging, stable for management on floors.      OBJECTIVE --  Vital Signs Last 24 Hrs  T(C): 37.1 (11 Jun 2023 16:00), Max: 37.1 (10 Onur 2023 23:36)  T(F): 98.7 (11 Jun 2023 16:00), Max: 98.8 (10 Onur 2023 23:36)  HR: 63 (11 Jun 2023 19:00) (61 - 87)  BP: 110/62 (11 Jun 2023 19:00) (99/63 - 157/80)  BP(mean): 74 (11 Jun 2023 19:00) (65 - 100)  RR: 19 (11 Jun 2023 19:00) (15 - 25)  SpO2: 98% (11 Jun 2023 19:00) (96% - 100%)    Parameters below as of 11 Jun 2023 08:00  Patient On (Oxygen Delivery Method): room air        I&O's Summary    10 Onur 2023 07:01  -  11 Jun 2023 07:00  --------------------------------------------------------  IN: 100 mL / OUT: 650 mL / NET: -550 mL    11 Jun 2023 07:01  -  11 Jun 2023 19:44  --------------------------------------------------------  IN: 600 mL / OUT: 1800 mL / NET: -1200 mL        MEDICATIONS  (STANDING):  atorvastatin 40 milliGRAM(s) Oral at bedtime  chlorhexidine 2% Cloths 1 Application(s) Topical <User Schedule>  divalproex  milliGRAM(s) Oral <User Schedule>    MEDICATIONS  (PRN):        LABS                                            12.6                  Neurophils% (auto):   x      (06-11 @ 03:00):    7.22 )-----------(101          Lymphocytes% (auto):  x                                             38.6                   Eosinphils% (auto):   x        Manual%: Neutrophils x    ; Lymphocytes x    ; Eosinophils x    ; Bands%: x    ; Blasts x                                    134    |  101    |  19                  Calcium: 9.0   / iCa: x      (06-11 @ 03:00)    ----------------------------<  132       Magnesium: x                                4.8     |  21     |  0.96             Phosphorous: x        TPro  6.3    /  Alb  3.9    /  TBili  0.4    /  DBili  x      /  AST  22     /  ALT  12     /  AlkPhos  34     11 Jun 2023 03:00    ( 06-11 @ 03:00 )   PT: 11.8 sec;   INR: 1.02 ratio  aPTT: 24.1 sec          ASSESSMENT & PLAN:   78 y/o M with PMH of HTN and seizure disorder BIBEMS after developing slurred speech, s/p code stroke, admitted to MICU for q1 neuro checks.      NEUROLOGY  #Slurred speech, s/p code stroke  Etiology possibly 2/2 ischemic stroke, pt improved s/p TPA at 22:48 PM on 6/10/23, however still has some residual slurred speech. Etiology also possibly 2/2 seizure. CT Head, CTA H/N remarkable for small area of prolonged Tmax at the left parieto-occipital junction is of uncertain clinical significance, possibly artifact.  -repeat CT Head at 24 hours negative  -bedside dysphagia screen passed, diet resumed as below  - 24h stability cth neg, ok to start asa and lovenox as below  -continue atorvastatin 40 mg daily  -pending MRI Brain w/o con to eval for acute ischemic stroke, checklist performed  -TTE, EKG, CXR unremarkable  -monitor on telemetry  -Permissive HTN up to 180/105, cardene gtt PRN  -Aspiration, fall precautions  -continue PT/OT  -24h EEG needs to be resumed, paused and removed for cth, to have leads replaced today      PULMONARY  #MARINO, satting well on RA    CARDIOVASCULAR  #MARINO  - permissive HTN as above  - ASA 81 daily  - TTE unremarkable    RENAL  #MARINO    GASTROINTESTINAL  #MARINO  - Diet: dash, passed dysphagia screen    INFECTIOUS DISEASE  #MARINO    ENDOCRINE  #MARINO  -A1C 5.8    HEME  #MARINO  - Lovenox dvt ppx    FLUIDS/ELECTROLYTES/NUTRITION  # Diet: passed dysphagia screen, dash diet      PROPHYLAXIS  # DVT: lovenox      CODE STATUS: full code          For Follow-Up:  [ ] f/u neuro recs  [ ] f/u MRI Brain today  [ ] resume vEEG after MRI today

## 2023-06-11 NOTE — PHYSICAL THERAPY INITIAL EVALUATION ADULT - GENERAL OBSERVATIONS, REHAB EVAL
Pt received semi-supine in bed, +EEG, +monitor lines, all lines/tubes intact, NAD. Heart rate:66 beats per minute, oxygen saturation: 98%, BP: 130/76 mmHg

## 2023-06-11 NOTE — PHYSICAL THERAPY INITIAL EVALUATION ADULT - ADDITIONAL COMMENTS
Pt lives in an apartment by himself with no stairs. Prior to admission, Pt reports to have been independent with ADLs/ambulation with no assistive device. Pt reports no falls in the last 6 months     Pt left semi-supine, all lines/tubes intact, NAD, +call bell in reach, RN aware
MILD

## 2023-06-11 NOTE — PATIENT PROFILE ADULT - FALL HARM RISK - RISK INTERVENTIONS

## 2023-06-11 NOTE — PHYSICAL THERAPY INITIAL EVALUATION ADULT - AMBULATION SKILLS, REHAB EVAL
Reviewed TSH from outside facility.  Thyroid dose was recently adjusted.  She currently is doing well.  Continues to follow with Endocrinology.   independent

## 2023-06-12 LAB
ALBUMIN SERPL ELPH-MCNC: 4 G/DL — SIGNIFICANT CHANGE UP (ref 3.3–5)
ALP SERPL-CCNC: 32 U/L — LOW (ref 40–120)
ALT FLD-CCNC: 11 U/L — SIGNIFICANT CHANGE UP (ref 4–41)
ANION GAP SERPL CALC-SCNC: 12 MMOL/L — SIGNIFICANT CHANGE UP (ref 7–14)
APTT BLD: 24.2 SEC — LOW (ref 27–36.3)
AST SERPL-CCNC: 18 U/L — SIGNIFICANT CHANGE UP (ref 4–40)
BILIRUB SERPL-MCNC: 0.5 MG/DL — SIGNIFICANT CHANGE UP (ref 0.2–1.2)
BUN SERPL-MCNC: 14 MG/DL — SIGNIFICANT CHANGE UP (ref 7–23)
CALCIUM SERPL-MCNC: 9.1 MG/DL — SIGNIFICANT CHANGE UP (ref 8.4–10.5)
CHLORIDE SERPL-SCNC: 106 MMOL/L — SIGNIFICANT CHANGE UP (ref 98–107)
CO2 SERPL-SCNC: 22 MMOL/L — SIGNIFICANT CHANGE UP (ref 22–31)
CREAT SERPL-MCNC: 0.92 MG/DL — SIGNIFICANT CHANGE UP (ref 0.5–1.3)
EGFR: 86 ML/MIN/1.73M2 — SIGNIFICANT CHANGE UP
GLUCOSE SERPL-MCNC: 115 MG/DL — HIGH (ref 70–99)
HCT VFR BLD CALC: 41.6 % — SIGNIFICANT CHANGE UP (ref 39–50)
HGB BLD-MCNC: 13.1 G/DL — SIGNIFICANT CHANGE UP (ref 13–17)
INR BLD: 1.05 RATIO — SIGNIFICANT CHANGE UP (ref 0.88–1.16)
MAGNESIUM SERPL-MCNC: 2 MG/DL — SIGNIFICANT CHANGE UP (ref 1.6–2.6)
MCHC RBC-ENTMCNC: 29.3 PG — SIGNIFICANT CHANGE UP (ref 27–34)
MCHC RBC-ENTMCNC: 31.5 GM/DL — LOW (ref 32–36)
MCV RBC AUTO: 93.1 FL — SIGNIFICANT CHANGE UP (ref 80–100)
NRBC # BLD: 0 /100 WBCS — SIGNIFICANT CHANGE UP (ref 0–0)
NRBC # FLD: 0 K/UL — SIGNIFICANT CHANGE UP (ref 0–0)
PHOSPHATE SERPL-MCNC: 3 MG/DL — SIGNIFICANT CHANGE UP (ref 2.5–4.5)
PLATELET # BLD AUTO: 101 K/UL — LOW (ref 150–400)
POTASSIUM SERPL-MCNC: 4.1 MMOL/L — SIGNIFICANT CHANGE UP (ref 3.5–5.3)
POTASSIUM SERPL-SCNC: 4.1 MMOL/L — SIGNIFICANT CHANGE UP (ref 3.5–5.3)
PROT SERPL-MCNC: 6.5 G/DL — SIGNIFICANT CHANGE UP (ref 6–8.3)
PROTHROM AB SERPL-ACNC: 12.2 SEC — SIGNIFICANT CHANGE UP (ref 10.5–13.4)
RBC # BLD: 4.47 M/UL — SIGNIFICANT CHANGE UP (ref 4.2–5.8)
RBC # FLD: 15 % — HIGH (ref 10.3–14.5)
SODIUM SERPL-SCNC: 140 MMOL/L — SIGNIFICANT CHANGE UP (ref 135–145)
WBC # BLD: 6.95 K/UL — SIGNIFICANT CHANGE UP (ref 3.8–10.5)
WBC # FLD AUTO: 6.95 K/UL — SIGNIFICANT CHANGE UP (ref 3.8–10.5)

## 2023-06-12 PROCEDURE — 99233 SBSQ HOSP IP/OBS HIGH 50: CPT | Mod: GC

## 2023-06-12 PROCEDURE — 99233 SBSQ HOSP IP/OBS HIGH 50: CPT

## 2023-06-12 RX ADMIN — DIVALPROEX SODIUM 500 MILLIGRAM(S): 500 TABLET, DELAYED RELEASE ORAL at 17:44

## 2023-06-12 RX ADMIN — DIVALPROEX SODIUM 500 MILLIGRAM(S): 500 TABLET, DELAYED RELEASE ORAL at 06:07

## 2023-06-12 RX ADMIN — ATORVASTATIN CALCIUM 40 MILLIGRAM(S): 80 TABLET, FILM COATED ORAL at 22:14

## 2023-06-12 RX ADMIN — ENOXAPARIN SODIUM 40 MILLIGRAM(S): 100 INJECTION SUBCUTANEOUS at 06:07

## 2023-06-12 RX ADMIN — Medication 81 MILLIGRAM(S): at 11:53

## 2023-06-12 RX ADMIN — CHLORHEXIDINE GLUCONATE 1 APPLICATION(S): 213 SOLUTION TOPICAL at 06:07

## 2023-06-12 NOTE — PROGRESS NOTE ADULT - ASSESSMENT
78 y/o M with PMH of HTN and seizure disorder BIBEMS after developing slurred speech, s/p code stroke, admitted to MICU for q1 neuro checks.      NEUROLOGY  #Slurred speech, s/p code stroke  Etiology possibly 2/2 ischemic stroke, pt improved s/p TPA at 22:48 PM on 6/10/23, however still has some residual slurred speech. Etiology also possibly 2/2 seizure. CT Head, CTA H/N remarkable for small area of prolonged Tmax at the left parieto-occipital junction is of uncertain clinical significance, possibly artifact.  -repeat CT Head at 24 hours (10 PM on 6/11/23)  -bedside dysphagia screen passed, diet resumed as below  -start aspirin 81 mg daily if repeat CT Head negative for hemorrhage  -start atorvastatin 40 mg daily  -start lovenox for DVT PPx if repeat CT Head negative for hemorrhage, c/w SCDs for now  -f/u lipid panel and A1C  -pending MRI Brain w/o con to eval for acute ischemic stroke, checklist performed  -TTE, EKG, CXR unremarkable  -consider ILR  -initial lactate 2.0, will recheck, f/u CK and prolactin  -monitor on telemetry  -Permissive HTN up to 180/105, cardene gtt PRN  -Aspiration, fall precautions  -continue PT/OT  -f/u 24h EEG      PULMONARY  #MARINO, satting well on RA    CARDIOVASCULAR  #MARINO, permissive HTN as above    RENAL  #MARINO    GASTROINTESTINAL  #MARINO    INFECTIOUS DISEASE  #MARINO    ENDOCRINE  #MARINO  -A1C 5.8    HEME  #MARINO    FLUIDS/ELECTROLYTES/NUTRITION  # Diet: passed dysphagia screen, dash diet      PROPHYLAXIS  # DVT: SCDs for 24 hours then lovenox if no hemorrhage      CODE STATUS: full code 78 y/o M with PMH of HTN and seizure disorder BIBEMS after developing slurred speech, s/p code stroke, admitted to MICU for q1 neuro checks.      NEUROLOGY  #Slurred speech, s/p code stroke  Etiology possibly 2/2 ischemic stroke, pt improved s/p TPA at 22:48 PM on 6/10/23, however still has some residual slurred speech. Etiology also possibly 2/2 seizure. CT Head, CTA H/N remarkable for small area of prolonged Tmax at the left parieto-occipital junction is of uncertain clinical significance, possibly artifact.  -repeat CT Head at 24 hours negative  -bedside dysphagia screen passed, diet resumed as below  -f/u with neuro to start aspirin 81 mg daily with repeat CT Head negative for hemorrhage, lovenox for DVT PPx  -continue atorvastatin 40 mg daily  -f/u lipid panel and A1C  -pending MRI Brain w/o con to eval for acute ischemic stroke, checklist performed  -TTE, EKG, CXR unremarkable  -consider ILR  -initial lactate 2.0, will recheck, f/u CK and prolactin  -monitor on telemetry  -Permissive HTN up to 180/105, cardene gtt PRN  -Aspiration, fall precautions  -continue PT/OT  -24h EEG needs to be resumed, paused and removed for cth, to have leads replaced today      PULMONARY  #MARINO, satting well on RA    CARDIOVASCULAR  #MARINO, permissive HTN as above    RENAL  #MARINO    GASTROINTESTINAL  #MARINO  - Diet: dash, passed dysphagia screen    INFECTIOUS DISEASE  #MARINO    ENDOCRINE  #MARINO  -A1C 5.8    HEME  #MARINO    FLUIDS/ELECTROLYTES/NUTRITION  # Diet: passed dysphagia screen, dash diet      PROPHYLAXIS  # DVT: SCDs for 24 hours then lovenox if no hemorrhage      CODE STATUS: full code 78 y/o M with PMH of HTN and seizure disorder BIBEMS after developing slurred speech, s/p code stroke, admitted to MICU for q1 neuro checks.      NEUROLOGY  #Slurred speech, s/p code stroke  Etiology possibly 2/2 ischemic stroke, pt improved s/p TPA at 22:48 PM on 6/10/23, however still has some residual slurred speech. Etiology also possibly 2/2 seizure. CT Head, CTA H/N remarkable for small area of prolonged Tmax at the left parieto-occipital junction is of uncertain clinical significance, possibly artifact.  -repeat CT Head at 24 hours negative  -bedside dysphagia screen passed, diet resumed as below  - 24h stability cth neg, ok to start asa and lovenox as below  -continue atorvastatin 40 mg daily  -pending MRI Brain w/o con to eval for acute ischemic stroke, checklist performed  -TTE, EKG, CXR unremarkable though TTE without bubble, will d/w neuro to see if repeat needed for bubble  -monitor on telemetry  -Permissive HTN up to 180/105, cardene gtt PRN  -Aspiration, fall precautions  -continue PT/OT  -24h EEG needs to be resumed, paused and removed for cth, to have leads replaced today      PULMONARY  #MARINO, satting well on RA    CARDIOVASCULAR  #MARINO  - permissive HTN as above  - ASA 81 daily  - F/u with neuro if TTE with bubble needed or if current TTE sufficient    RENAL  #MARINO    GASTROINTESTINAL  #MARINO  - Diet: dash, passed dysphagia screen    INFECTIOUS DISEASE  #MARINO    ENDOCRINE  #MARINO  -A1C 5.8    HEME  #MARINO  - Lovenox dvt ppx    FLUIDS/ELECTROLYTES/NUTRITION  # Diet: passed dysphagia screen, dash diet      PROPHYLAXIS  # DVT: lovenox      CODE STATUS: full code

## 2023-06-12 NOTE — EEG REPORT - NS EEG TEXT BOX
MICHAEL RODRIGUEZ [id number]     Study Date: 		06-11-23 10:28 to 06-12-23 04:00 leads off since at least 21:00  Duration x Hours: 17:59  --------------------------------------------------------------------------------------------------  History:  CC/ HPI Patient is a 77y old  Male who presents with a chief complaint of c/f stroke vs seizure (12 Jun 2023 07:12)    MEDICATIONS  (STANDING):  aspirin enteric coated 81 milliGRAM(s) Oral daily  atorvastatin 40 milliGRAM(s) Oral at bedtime  chlorhexidine 2% Cloths 1 Application(s) Topical <User Schedule>  divalproex  milliGRAM(s) Oral <User Schedule>  enoxaparin Injectable 40 milliGRAM(s) SubCutaneous every 24 hours    --------------------------------------------------------------------------------------------------  Study Interpretation:    [[[Abbreviation Key:  PDR=alpha rhythm/posterior dominant rhythm. A-P=anterior posterior.  Amplitude: ‘very low’:<20; ‘low’:20-49; ‘medium’:; ‘high’:>150uV.  Persistence for periodic/rhythmic patterns (% of epoch) ‘rare’:<1%; ‘occasional’:1-10%; ‘frequent’:10-50%; ‘abundant’:50-90%; ‘continuous’:>90%.  Persistence for sporadic discharges: ‘rare’:<1/hr; ‘occasional’:1/min-1/hr; ‘frequent’:>1/min; ‘abundant’:>1/10 sec.  RPP=rhythmic and periodic patterns; GRDA=generalized rhythmic delta activity; FIRDA=frontal intermittent GRDA; LRDA=lateralized rhythmic delta activity; TIRDA=temporal intermittent rhythmic delta activity;  LPD=PLED=lateralized periodic discharges; GPD=generalized periodic discharges; BIPDs =bilateral independent periodic discharges; Mf=multifocal; SIRPDs=stimulus induced rhythmic, periodic, or ictal appearing discharges; BIRDs=brief potentially ictal rhythmic discharges >4 Hz, lasting .5-10s; PFA (paroxysmal bursts >13 Hz or =8 Hz <10s).  Modifiers: +F=with fast component; +S=with spike component; +R=with rhythmic component.  S-B=burst suppression pattern.  Max=maximal. N1-drowsy; N2-stage II sleep; N3-slow wave sleep. SSS/BETS=small sharp spikes/benign epileptiform transients of sleep. HV=hyperventilation; PS=photic stimulation]]]    Daily EEG Visual Analysis    FINDINGS:      Background:  Continuous: continuous  Symmetry: symmetric  PDR: 8.5 Hz activity, with amplitude to 40 uV, that attenuated to eye opening.  Low amplitude frontal beta noted in wakefulness.  Reactivity: present  Voltage: normal, mostly 20-150uV  Anterior Posterior Gradient: present  Other background findings: none  Breach: absent    Background Slowing:  Generalized slowing: none was present.  Focal slowing: none was present.    State Changes:   -Drowsiness noted with increased slowing, attenuation of fast activity, vertex transients.  -Present with N2 sleep transients with symmetric spindles and K-complexes.    Sporadic Epileptiform Discharges:    None    Rhythmic and Periodic Patterns (RPPs):  None     Electrographic and Electroclinical seizures:  None    Other Clinical Events:  None    Activation Procedures:   -Hyperventilation was not performed.    -Photic stimulation was not performed.     Artifacts:  Intermittent myogenic and movement artifacts were noted. Abundant artifact after 21:00 due to leads off    ECG:  The heart rate on single channel ECG was predominantly between 60-70 BPM.    EEG Classification / Summary:  Normal EEG in the awake / drowsy / asleep state(s).    Leads off after 2100    Clinical Impression:    Normal VEEG  There were no epileptiform abnormalities recorded.    Leads off after 2100    This is a prelim report only, pending review with attending prior to finalization.      -------------------------------------------------------------------------------------------------------  [facility] EEG Reading Room Ph#: [phone]  Epilepsy Answering Service after 5PM and before 8:30AM: Ph#: [phone]    Denilson Hernandez M.D.   Epilepsy fellow MICHAEL RODRIGUEZ [id number]     Study Date: 		06-11-23 10:28 to 06-12-23 04:38 leads off since at least 21:00  Duration x Hours: 17:59  --------------------------------------------------------------------------------------------------  History:  CC/ HPI Patient is a 77y old  Male who presents with a chief complaint of c/f stroke vs seizure (12 Jun 2023 07:12)    MEDICATIONS  (STANDING):  aspirin enteric coated 81 milliGRAM(s) Oral daily  atorvastatin 40 milliGRAM(s) Oral at bedtime  chlorhexidine 2% Cloths 1 Application(s) Topical <User Schedule>  divalproex  milliGRAM(s) Oral <User Schedule>  enoxaparin Injectable 40 milliGRAM(s) SubCutaneous every 24 hours    --------------------------------------------------------------------------------------------------  Study Interpretation:    [[[Abbreviation Key:  PDR=alpha rhythm/posterior dominant rhythm. A-P=anterior posterior.  Amplitude: ‘very low’:<20; ‘low’:20-49; ‘medium’:; ‘high’:>150uV.  Persistence for periodic/rhythmic patterns (% of epoch) ‘rare’:<1%; ‘occasional’:1-10%; ‘frequent’:10-50%; ‘abundant’:50-90%; ‘continuous’:>90%.  Persistence for sporadic discharges: ‘rare’:<1/hr; ‘occasional’:1/min-1/hr; ‘frequent’:>1/min; ‘abundant’:>1/10 sec.  RPP=rhythmic and periodic patterns; GRDA=generalized rhythmic delta activity; FIRDA=frontal intermittent GRDA; LRDA=lateralized rhythmic delta activity; TIRDA=temporal intermittent rhythmic delta activity;  LPD=PLED=lateralized periodic discharges; GPD=generalized periodic discharges; BIPDs =bilateral independent periodic discharges; Mf=multifocal; SIRPDs=stimulus induced rhythmic, periodic, or ictal appearing discharges; BIRDs=brief potentially ictal rhythmic discharges >4 Hz, lasting .5-10s; PFA (paroxysmal bursts >13 Hz or =8 Hz <10s).  Modifiers: +F=with fast component; +S=with spike component; +R=with rhythmic component.  S-B=burst suppression pattern.  Max=maximal. N1-drowsy; N2-stage II sleep; N3-slow wave sleep. SSS/BETS=small sharp spikes/benign epileptiform transients of sleep. HV=hyperventilation; PS=photic stimulation]]]    Daily EEG Visual Analysis    FINDINGS:      Background:  Continuous: continuous  Symmetry: symmetric  PDR: 8.5 Hz activity, with amplitude to 40 uV, that attenuated to eye opening.  Low amplitude frontal beta noted in wakefulness.  Reactivity: present  Voltage: normal, mostly 20-150uV  Anterior Posterior Gradient: present  Other background findings: none  Breach: absent    Background Slowing:  Generalized slowing: none was present.  Focal slowing: none was present.    State Changes:   -Drowsiness noted with increased slowing, attenuation of fast activity, vertex transients.  -Present with N2 sleep transients with symmetric spindles and K-complexes.    Sporadic Epileptiform Discharges:    None    Rhythmic and Periodic Patterns (RPPs):  None     Electrographic and Electroclinical seizures:  None    Other Clinical Events:  None    Activation Procedures:   -Hyperventilation was not performed.    -Photic stimulation was not performed.     Artifacts:  Intermittent myogenic and movement artifacts were noted. Abundant artifact after 21:00 due to leads off    ECG:  The heart rate on single channel ECG was predominantly between 60-70 BPM.    EEG Classification / Summary:  Normal EEG in the awake / drowsy / asleep state(s).    Leads off after 2100    Clinical Impression:    Normal VEEG  There were no epileptiform abnormalities recorded.    Leads off after 2100    This is a prelim report only, pending review with attending prior to finalization.      -------------------------------------------------------------------------------------------------------  [facility] EEG Reading Room Ph#: [phone]  Epilepsy Answering Service after 5PM and before 8:30AM: Ph#: [phone]    Denilson Hernandez M.D.   Epilepsy fellow MICHAEL RODRIGUEZ [id number]     Study Date: 		06-11-23 10:28 to 06-12-23 04:38 leads off since at least 21:00  Duration x Hours: 17:59  --------------------------------------------------------------------------------------------------  History:  CC/ HPI Patient is a 77y old  Male who presents with a chief complaint of c/f stroke vs seizure (12 Jun 2023 07:12)    MEDICATIONS  (STANDING):  aspirin enteric coated 81 milliGRAM(s) Oral daily  atorvastatin 40 milliGRAM(s) Oral at bedtime  chlorhexidine 2% Cloths 1 Application(s) Topical <User Schedule>  divalproex  milliGRAM(s) Oral <User Schedule>  enoxaparin Injectable 40 milliGRAM(s) SubCutaneous every 24 hours    --------------------------------------------------------------------------------------------------  Study Interpretation:    [[[Abbreviation Key:  PDR=alpha rhythm/posterior dominant rhythm. A-P=anterior posterior.  Amplitude: ‘very low’:<20; ‘low’:20-49; ‘medium’:; ‘high’:>150uV.  Persistence for periodic/rhythmic patterns (% of epoch) ‘rare’:<1%; ‘occasional’:1-10%; ‘frequent’:10-50%; ‘abundant’:50-90%; ‘continuous’:>90%.  Persistence for sporadic discharges: ‘rare’:<1/hr; ‘occasional’:1/min-1/hr; ‘frequent’:>1/min; ‘abundant’:>1/10 sec.  RPP=rhythmic and periodic patterns; GRDA=generalized rhythmic delta activity; FIRDA=frontal intermittent GRDA; LRDA=lateralized rhythmic delta activity; TIRDA=temporal intermittent rhythmic delta activity;  LPD=PLED=lateralized periodic discharges; GPD=generalized periodic discharges; BIPDs =bilateral independent periodic discharges; Mf=multifocal; SIRPDs=stimulus induced rhythmic, periodic, or ictal appearing discharges; BIRDs=brief potentially ictal rhythmic discharges >4 Hz, lasting .5-10s; PFA (paroxysmal bursts >13 Hz or =8 Hz <10s).  Modifiers: +F=with fast component; +S=with spike component; +R=with rhythmic component.  S-B=burst suppression pattern.  Max=maximal. N1-drowsy; N2-stage II sleep; N3-slow wave sleep. SSS/BETS=small sharp spikes/benign epileptiform transients of sleep. HV=hyperventilation; PS=photic stimulation]]]    Daily EEG Visual Analysis    FINDINGS:      Background:  Continuous: continuous  Symmetry: symmetric  PDR: 8.5 Hz activity, with amplitude to 40 uV, that attenuated to eye opening.  Low amplitude frontal beta noted in wakefulness.  Reactivity: present  Voltage: normal, mostly 20-150uV  Anterior Posterior Gradient: present  Other background findings: none  Breach: absent    Background Slowing:  Generalized slowing: none was present.  Focal slowing: none was present.    State Changes:   -Drowsiness noted with increased slowing, attenuation of fast activity, vertex transients.  -Present with N2 sleep transients with symmetric spindles and K-complexes.    Sporadic Epileptiform Discharges:    None    Rhythmic and Periodic Patterns (RPPs):  None     Electrographic and Electroclinical seizures:  None    Other Clinical Events:  None    Activation Procedures:   -Hyperventilation was not performed.    -Photic stimulation was not performed.     Artifacts:  Intermittent myogenic and movement artifacts were noted. Abundant artifact after 21:00 due to leads off    ECG:  The heart rate on single channel ECG was predominantly between 60-70 BPM.    EEG Classification / Summary:  Normal EEG in the awake / drowsy / asleep state(s).    Leads off after 2100    Clinical Impression:    Normal VEEG  There were no epileptiform abnormalities recorded.    Leads off after 2100      -------------------------------------------------------------------------------------------------------  [facility] EEG Reading Room Ph#: [phone]  Epilepsy Answering Service after 5PM and before 8:30AM: Ph#: [phone]    Denilson Hernandez M.D.   Epilepsy fellow    Ramin Monroy MD  Neurology Attending Physician

## 2023-06-12 NOTE — PROGRESS NOTE ADULT - ATTENDING COMMENTS
77 M with htn and seizure d/o here with slurred speech, cornering for acute ischemic stroke s/p TNK     Patient with some pronator drift though improved per report  still with expressive aphasia but slowly improving    - c/w q1h neuro checks for now  - BP control for htn s/p TNK  - MRI pending  - will f/u EEG to r/o seizure  - repeat CT head tonight    POCUS: LVSF   DVT ppx: held given tnk  GOC: full code
77 year old man with history of seizures presented as a code stroke with slurred speech on 6/10 s/p TNK administration     - alert and awake  - follow up CT stable without hemorrhagic conversion  - tolerating diet  - awaiting EEG    eligible for transfer to floor

## 2023-06-12 NOTE — PROGRESS NOTE ADULT - SUBJECTIVE AND OBJECTIVE BOX
INTERVAL HISTORY: Patient seen at bedside by stroke team & attending. No acute events overnight. Patient reports resolution of symptoms, but was not aware of being weak on the right side of body. Admits that he does not recall the episode well, only being in the ambulance then ending up in ICU. Most recent seizure was 2 years ago.     PAST MEDICAL & SURGICAL HISTORY:  Hypertension      Hyperlipidemia      Focal epilepsy      S/P left rotator cuff repair      S/P right rotator cuff repair        FAMILY HISTORY:  FHx: lung cancer  Father due to tobacco use      SOCIAL HISTORY:   T/E/D:   Occupation:   Lives with:     MEDICATIONS (HOME):  Home Medications:  Depakote  mg oral tablet, extended release: 2 tab(s) orally 2 times a day (10 Onur 2023 23:34)  lisinopril 10 mg oral tablet: 1 tab(s) orally once a day (10 Onur 2023 23:34)  simvastatin 20 mg oral tablet: 1 tab(s) orally once a day (at bedtime) (10 Onur 2023 23:34)    MEDICATIONS  (STANDING):  aspirin enteric coated 81 milliGRAM(s) Oral daily  atorvastatin 40 milliGRAM(s) Oral at bedtime  chlorhexidine 2% Cloths 1 Application(s) Topical <User Schedule>  divalproex  milliGRAM(s) Oral <User Schedule>  enoxaparin Injectable 40 milliGRAM(s) SubCutaneous every 24 hours    MEDICATIONS  (PRN):    ALLERGIES/INTOLERANCES:  Allergies  No Known Allergies    Intolerances    VITALS & EXAMINATION:  Vital Signs Last 24 Hrs  T(C): 35.8 (12 Jun 2023 08:00), Max: 37.1 (11 Jun 2023 16:00)  T(F): 96.4 (12 Jun 2023 08:00), Max: 98.7 (11 Jun 2023 16:00)  HR: 55 (12 Jun 2023 08:00) (55 - 87)  BP: 121/69 (12 Jun 2023 08:00) (106/69 - 130/76)  BP(mean): 81 (12 Jun 2023 08:00) (74 - 94)  RR: 13 (12 Jun 2023 08:00) (13 - 24)  SpO2: 98% (12 Jun 2023 08:00) (96% - 99%)    Parameters below as of 12 Jun 2023 08:00  Patient On (Oxygen Delivery Method): room air        General:  Constitutional: Male, appears stated age, in no apparent distress including pain  Head: Normocephalic & Atraumatic.  Respiratory: Breathing comfortably.    Neurological:  MS: Eyes open. Awake, alert, oriented to person, place, situation, time. Follows all commands.    Language: Speech is without dysarthria, occasionally has subtle stuttering, but is otherwise fluent with good repetition.    CNs: PERRL (R = 3mm, L = 3mm). VFF. EOMI no nystagmus. V1-3 intact to LT b/l. No facial asymmetry b/l, full eye closure strength b/l. Hearing grossly normal (rubbing fingers) b/l. Tongue midline, normal movements, no atrophy.     Motor: Normal muscle bulk & tone. No noticeable tremor. No drift in UE or LE b/l; strength 5/5 symmetric b/l. 	     Sensation: Intact to LT b/l throughout.     Cortical: Extinction on DSS (neglect): none    Coordination: No dysmetria to FTN b/l.     Gait: Deferred.         LABORATORY:  CBC                       13.1   6.95  )-----------( 101      ( 12 Jun 2023 04:45 )             41.6     Chem 06-12    140  |  106  |  14  ----------------------------<  115<H>  4.1   |  22  |  0.92    Ca    9.1      12 Jun 2023 04:45  Phos  3.0     06-12  Mg     2.00     06-12    TPro  6.5  /  Alb  4.0  /  TBili  0.5  /  DBili  x   /  AST  18  /  ALT  11  /  AlkPhos  32<L>  06-12    LFTs LIVER FUNCTIONS - ( 12 Jun 2023 04:45 )  Alb: 4.0 g/dL / Pro: 6.5 g/dL / ALK PHOS: 32 U/L / ALT: 11 U/L / AST: 18 U/L / GGT: x           Coagulopathy PT/INR - ( 12 Jun 2023 04:45 )   PT: 12.2 sec;   INR: 1.05 ratio         PTT - ( 12 Jun 2023 04:45 )  PTT:24.2 sec  Lipid Panel 06-11 Chol 131 LDL -- HDL 44 Trig 50  A1c   Cardiac enzymes CARDIAC MARKERS ( 11 Jun 2023 03:00 )  x     / x     / 114 U/L / x     / x          U/A   CSF  Immunological  Other    STUDIES & IMAGING:  Studies (EKG, EEG, EMG, etc):     Radiology (XR, CT, MR, U/S, TTE/ESTELLE):    TTE:     Ejection Fraction (Modified Huff Rule): 61 %  ------------------------------------------------------------------------  OBSERVATIONS:  Mitral Valve: Mitral annular calcification, otherwise  normal mitral valve. Minimal mitral regurgitation.  Aortic Root: Normal aortic root.  Aortic Valve: Calcified trileaflet aortic valve with normal  opening. Mild aortic regurgitation.  Left Atrium: Normal left atrium.  LA volume index = 22  cc/m2.  Left Ventricle: Endocardium not well visualized; grossly  normal left ventricular systolic function. Normal left  ventricular internal dimensions and wall thicknesses. Mild  diastolic dysfunction (Stage I).  Right Heart: Normal right atrium. Normal right ventricular  size and function. Normal tricuspid valve. Mild tricuspid  regurgitation. Normal pulmonic valve.  Pericardium/PleuraNormal pericardium with no pericardial  effusion.  ------------------------------------------------------------------------  CONCLUSIONS:  1. Mitral annular calcification, otherwise normal mitral  valve. Minimal mitral regurgitation.  2. Calcified trileaflet aortic valve with normal opening.  Mild aortic regurgitation.  3. Normal left ventricular internal dimensions and wall  thicknesses.  4. Endocardium not well visualized; grossly normal left  ventricular systolic function.  5.Mild diastolic dysfunction (Stage I).  6. Normal right ventricular size and function.  ------------------------------------------------------------------------  Confirmed on  6/11/2023 - 10:05:05 by Kishore Mercer M.D.,  [facility], [facility]  ------------------------------------------------------------------------        EEG:     Normal VEEG  There were no epileptiform abnormalities recorded.    Leads off after 2100    rCTH (6/11/23)    No acute transcortical infarction, intracranial hemorrhage,   hydrocephalus, or extra-axial fluid collections. No midline shift or mass   effect. Stable mild generalized parenchymal volume loss and chronic   microvascular disease. Intracranial vascular calcifications are present.    The visualized intraorbital contents are unremarkable. The imaged   portions of the paranasal sinuses are clear. The mastoid air cells are   clear. The visualized soft tissues and osseous structures appear normal.    IMPRESSION:    No CT evidence of acute intracranial pathology.    --- End of Report ---          CT Brain Stroke Protocol (06.10.23 @ 21:45)  IMPRESSION:  No acute intracranial hemorrhage. No CT evidence of acute, large   territorial infarct.       CTA H/N, CT Brain Perfusion Maps Stroke (06.10.23 @ 21:46)  N:    CTA BRAIN: Patent intracranial circulation. No flow-limiting stenosis or   occlusion.    CTA NECK: Patent cervical vasculature. No flow limiting stenosis or   occlusion.    CT PERFUSION: No core infarct. Small area of prolonged Tmax at the left   parieto-occipital junction is of uncertain clinical significance,   possibly artifact.       PRIOR CT Head No Cont (10.04.22 @ 00:46)  IMPRESSION:    CT head: No acute intracranial hemorrhage or mass effect.    CT cervical spine: No evidence for acute displaced fracture or   malalignment. Chronic degenerative changes.       PRIOR MR Brain-Seizure, Epilepsy w/wo IV Cont (10.24.20 @ 21:07)  Ventricular and sulcal prominence is consistent with age-appropriate involutional change. Small vessel white matter ischemic changes are noted. No acute infarcts are seen. There is no new hemorrhage. After contrast administration there is normal intracranial vascular enhancement. No abnormal parenchymal or leptomeningeal enhancement is identified.    No abnormal signal in the temporal lobes is identified.    The sellar and parasellar structures are unremarkable.      Impression: Age-appropriate involutional and ischemic gliotic changes. No acute infarcts, hemorrhage or mass. No abnormal enhancement.

## 2023-06-12 NOTE — PROGRESS NOTE ADULT - ASSESSMENT
77-year-old left-handed male w/ PMHx HTN, HLD, focal epilepsy (on VPA), who presents to [facility] ED for abnormal speech. Patient accompanied by girlfriend in ED. Per girlfriend, patient was watching television when he starting drooling and had slurred speech. When patient was found by EMS, he was not able to identify objects. Patient's girlfriend unable to describe patient's typical seizures. Per EMS, last known normal 20:00 6/10/23. Code stroke called in the ED 6/10/23. During code stroke, patient seemingly improved. However, while testing for upper extremity drift, patient developed worsening RUE weakness, also found to have R > L LE drift. Patient also with worsening speech during code stroke. (See examination below.) Of note, patient's daughter Chasity later added that he has a history of stuttering since childhood. Patient's girlfriend at bedside reports that patient has never sounded like the way he does now. On chart review of outpatient and inpatient neurology records, patient's seizure semiology includes sudden episode of impaired awareness preceded by apneic gasps. EEG 10/25/20: left temporal sharp waves, two subtle left hemispheric electrographic seizures, evolving over the temporal region. Patient with allergy to levetiracetam. Per patient, he is currently on valproic acid BID. Patient denies missing doses of medications. Exam as above for 6/12. vEEG x 17+ hours negative for seizure event. rCTH stable.     Impression: Transient speech disturbance a/w possible R hemiparesis (superimposed w/ ?generalized weakness) presumably due to L hemispheric dysfunction of unclear etiology at this time. Given the poor recollection surrounding the episode, seizure w/ post ictal period is in the differential but no events captured on vEEG. Rule out acute ischemic stroke of unknown mechanism.     Recommendations    [] MRI brain w/o contrast  [] Can consider repeat EEG if MRI negative but no need for further monitoring at this point (17+ hours already recorded).   [] Further workup to be considered following MRI brain w/o contrast   [] C/w ASA 81 mg indefinitely  [] Chemical DVT prophylaxis  [] Neurochecks: q4hr   [] BP goals: Maintain normotension, avoid hypotension   [] PT/OT dispo to be determined   [] Diet: c/w DASH diet as tolerated     Patient case discussed and seen with stroke attending, Dr. Vazquez.    Please call with questions: [phone]  77-year-old left-handed male w/ PMHx HTN, HLD, focal epilepsy (on VPA), who presents to [facility] ED for abnormal speech. Patient accompanied by girlfriend in ED. Per girlfriend, patient was watching television when he starting drooling and had slurred speech. When patient was found by EMS, he was not able to identify objects. Patient's girlfriend unable to describe patient's typical seizures. Per EMS, last known normal 20:00 6/10/23. Code stroke called in the ED 6/10/23. During code stroke, patient seemingly improved. However, while testing for upper extremity drift, patient developed worsening RUE weakness, also found to have R > L LE drift. Patient also with worsening speech during code stroke. (See examination below.) Of note, patient's daughter Chasity later added that he has a history of stuttering since childhood. Patient's girlfriend at bedside reports that patient has never sounded like the way he does now. On chart review of outpatient and inpatient neurology records, patient's seizure semiology includes sudden episode of impaired awareness preceded by apneic gasps. EEG 10/25/20: left temporal sharp waves, two subtle left hemispheric electrographic seizures, evolving over the temporal region. Patient with allergy to levetiracetam. Per patient, he is currently on valproic acid BID. Patient denies missing doses of medications. Exam as above for 6/12. vEEG x 18 hours negative for seizure event. rCTH stable.     Impression: Transient speech disturbance a/w possible R hemiparesis (superimposed w/ ?generalized weakness) presumably due to L hemispheric dysfunction of unclear etiology at this time. Given the poor recollection surrounding the episode, seizure w/ post ictal period is in the differential but no events captured on vEEG. Rule out acute ischemic stroke of unknown mechanism.     Recommendations    [] MRI brain w/o contrast  [] Can consider repeat EEG if MRI negative but no need for further monitoring at this point (18 hours already recorded).   [] Further workup to be considered following MRI brain w/o contrast   [] C/w ASA 81 mg indefinitely  [] Chemical DVT prophylaxis  [] Neurochecks: q4hr   [] BP goals: Maintain normotension, avoid hypotension   [] PT/OT dispo to be determined   [] Diet: c/w DASH diet as tolerated     Patient case discussed and seen with stroke attending, Dr. Vazquez.    Please call with questions: [phone]

## 2023-06-12 NOTE — PROGRESS NOTE ADULT - SUBJECTIVE AND OBJECTIVE BOX
Leonardo Weber MD  Emergency Medicine PGY 1      MICU PROGRESS NOTE     OVERNIGHT EVENTS:    SUBJECTIVE: Patient seen and examined at bedside. Patient denies fever, chills, SOB, chest pain, N/V/D.    OBJECTIVE:    VITAL SIGNS:  ICU Vital Signs Last 24 Hrs  T(C): 36.9 (12 Jun 2023 04:00), Max: 37.1 (11 Jun 2023 16:00)  T(F): 98.5 (12 Jun 2023 04:00), Max: 98.7 (11 Jun 2023 16:00)  HR: 65 (12 Jun 2023 04:00) (59 - 87)  BP: 126/76 (12 Jun 2023 04:00) (99/63 - 130/76)  BP(mean): 83 (12 Jun 2023 04:00) (65 - 94)  ABP: --  ABP(mean): --  RR: 20 (12 Jun 2023 04:00) (13 - 24)  SpO2: 98% (12 Jun 2023 04:00) (96% - 99%)    O2 Parameters below as of 12 Jun 2023 04:00  Patient On (Oxygen Delivery Method): room air              06-11 @ 07:01  -  06-12 @ 07:00  --------------------------------------------------------  IN: 600 mL / OUT: 2450 mL / NET: -1850 mL        =================PHYSICAL EXAM=================    CONSTITUTIONAL: Well-developed; well-nourished; in no acute distress.   SKIN: warm, dry  HEAD: Normocephalic; atraumatic.  EYES: no conjunctival injection. PERRL. EOMI without pain. no scleral icterus  ENT: No nasal discharge; airway clear.  NECK: Supple; non tender.  CARD: S1, S2 normal; no murmurs, gallops, or rubs. Regular rate and rhythm.   RESP: No wheezes, rales or rhonchi. Good air movement bilaterally.   ABD: soft ntnd, no guarding, no distention, no rigidity.   EXT: No cyanosis or edema.   NEURO: alert, oriented to ppte, cn 2-12 intact, motor strength 5/5 throughout, sensation intact throughout, coordination intact, romberg wnl, gait normal   PSYCH: Cooperative, appropriate.     =================================================    LABS:                        13.1   6.95  )-----------( 101      ( 12 Jun 2023 04:45 )             41.6     Auto Eosinophil # x     / Auto Eosinophil % x     / Auto Neutrophil # x     / Auto Neutrophil % x     / BANDS % x                            12.6   7.22  )-----------( 101      ( 11 Jun 2023 03:00 )             38.6     Auto Eosinophil # x     / Auto Eosinophil % x     / Auto Neutrophil # x     / Auto Neutrophil % x     / BANDS % x                            13.2   5.70  )-----------( 111      ( 10 Onur 2023 21:30 )             40.3     Auto Eosinophil # 0.09  / Auto Eosinophil % 1.6   / Auto Neutrophil # 2.57  / Auto Neutrophil % 45.1  / BANDS % x        06-12    140  |  106  |  14  ----------------------------<  115<H>  4.1   |  22  |  0.92  06-11    134<L>  |  101  |  19  ----------------------------<  132<H>  4.8   |  21<L>  |  0.96  06-10    135  |  98  |  16  ----------------------------<  121<H>  4.4   |  26  |  1.12    Ca    9.1      12 Jun 2023 04:45  Mg     2.00     06-12  Phos  3.0     06-12  TPro  6.5  /  Alb  4.0  /  TBili  0.5  /  DBili  x   /  AST  18  /  ALT  11  /  AlkPhos  32<L>  06-12  TPro  6.3  /  Alb  3.9  /  TBili  0.4  /  DBili  x   /  AST  22  /  ALT  12  /  AlkPhos  34<L>  06-11  TPro  6.8  /  Alb  4.2  /  TBili  0.4  /  DBili  x   /  AST  24  /  ALT  12  /  AlkPhos  37<L>  06-10    PT/INR - ( 12 Jun 2023 04:45 )   PT: 12.2 sec;   INR: 1.05 ratio         PTT - ( 12 Jun 2023 04:45 )  PTT:24.2 sec  CARDIAC MARKERS ( 11 Jun 2023 03:00 )  x     / x     / 114 U/L / x     / x                     MEDICATIONS:  MEDICATIONS  (STANDING):  aspirin enteric coated 81 milliGRAM(s) Oral daily  atorvastatin 40 milliGRAM(s) Oral at bedtime  chlorhexidine 2% Cloths 1 Application(s) Topical <User Schedule>  divalproex  milliGRAM(s) Oral <User Schedule>  enoxaparin Injectable 40 milliGRAM(s) SubCutaneous every 24 hours    MEDICATIONS  (PRN):      ALLERGIES:  Allergies    No Known Allergies    Intolerances        LABS:                        13.1   6.95  )-----------( 101      ( 12 Jun 2023 04:45 )             41.6     06-12    140  |  106  |  14  ----------------------------<  115<H>  4.1   |  22  |  0.92    Ca    9.1      12 Jun 2023 04:45  Phos  3.0     06-12  Mg     2.00     06-12    TPro  6.5  /  Alb  4.0  /  TBili  0.5  /  DBili  x   /  AST  18  /  ALT  11  /  AlkPhos  32<L>  06-12    PT/INR - ( 12 Jun 2023 04:45 )   PT: 12.2 sec;   INR: 1.05 ratio         PTT - ( 12 Jun 2023 04:45 )  PTT:24.2 sec      CAPILLARY BLOOD GLUCOSE      POCT Blood Glucose.: 114 mg/dL (10 Onur 2023 21:23)      RADIOLOGY & ADDITIONAL TESTS: Reviewed. Leonardo Weber MD  Emergency Medicine PGY 1      MICU PROGRESS NOTE     OVERNIGHT EVENTS: vEEG removed for rpt CTH last night, requiring replacement today.     SUBJECTIVE: Patient seen and examined at bedside. Felt well overnight with stable speech symptoms, no new complaints. Patient denies fever, chills, SOB, chest pain, N/V/D.    OBJECTIVE:    VITAL SIGNS:  ICU Vital Signs Last 24 Hrs  T(C): 36.9 (12 Jun 2023 04:00), Max: 37.1 (11 Jun 2023 16:00)  T(F): 98.5 (12 Jun 2023 04:00), Max: 98.7 (11 Jun 2023 16:00)  HR: 65 (12 Jun 2023 04:00) (59 - 87)  BP: 126/76 (12 Jun 2023 04:00) (99/63 - 130/76)  BP(mean): 83 (12 Jun 2023 04:00) (65 - 94)  ABP: --  ABP(mean): --  RR: 20 (12 Jun 2023 04:00) (13 - 24)  SpO2: 98% (12 Jun 2023 04:00) (96% - 99%)    O2 Parameters below as of 12 Jun 2023 04:00  Patient On (Oxygen Delivery Method): room air              06-11 @ 07:01  -  06-12 @ 07:00  --------------------------------------------------------  IN: 600 mL / OUT: 2450 mL / NET: -1850 mL        =================PHYSICAL EXAM=================    CONSTITUTIONAL: Well-developed; well-nourished; in no acute distress.   SKIN: warm, dry  HEAD: Normocephalic; atraumatic.  EYES: no conjunctival injection. PERRL. EOMI without pain. no scleral icterus  ENT: No nasal discharge; airway clear.  NECK: Supple; non tender.  CARD: S1, S2 normal; no murmurs, gallops, or rubs. Regular rate and rhythm.   RESP: No wheezes, rales or rhonchi. Good air movement bilaterally.   ABD: soft ntnd, no guarding, no distention, no rigidity.   EXT: No cyanosis or edema.   NEURO: alert, oriented to ppte, speech with stuttering, cn 2-12 intact, motor strength 5/5 throughout, sensation intact throughout, coordination intact  PSYCH: Cooperative, appropriate.     =================================================    LABS:                        13.1   6.95  )-----------( 101      ( 12 Jun 2023 04:45 )             41.6     Auto Eosinophil # x     / Auto Eosinophil % x     / Auto Neutrophil # x     / Auto Neutrophil % x     / BANDS % x                            12.6   7.22  )-----------( 101      ( 11 Jun 2023 03:00 )             38.6     Auto Eosinophil # x     / Auto Eosinophil % x     / Auto Neutrophil # x     / Auto Neutrophil % x     / BANDS % x                            13.2   5.70  )-----------( 111      ( 10 Onur 2023 21:30 )             40.3     Auto Eosinophil # 0.09  / Auto Eosinophil % 1.6   / Auto Neutrophil # 2.57  / Auto Neutrophil % 45.1  / BANDS % x        06-12    140  |  106  |  14  ----------------------------<  115<H>  4.1   |  22  |  0.92  06-11    134<L>  |  101  |  19  ----------------------------<  132<H>  4.8   |  21<L>  |  0.96  06-10    135  |  98  |  16  ----------------------------<  121<H>  4.4   |  26  |  1.12    Ca    9.1      12 Jun 2023 04:45  Mg     2.00     06-12  Phos  3.0     06-12  TPro  6.5  /  Alb  4.0  /  TBili  0.5  /  DBili  x   /  AST  18  /  ALT  11  /  AlkPhos  32<L>  06-12  TPro  6.3  /  Alb  3.9  /  TBili  0.4  /  DBili  x   /  AST  22  /  ALT  12  /  AlkPhos  34<L>  06-11  TPro  6.8  /  Alb  4.2  /  TBili  0.4  /  DBili  x   /  AST  24  /  ALT  12  /  AlkPhos  37<L>  06-10    PT/INR - ( 12 Jun 2023 04:45 )   PT: 12.2 sec;   INR: 1.05 ratio         PTT - ( 12 Jun 2023 04:45 )  PTT:24.2 sec  CARDIAC MARKERS ( 11 Jun 2023 03:00 )  x     / x     / 114 U/L / x     / x                     MEDICATIONS:  MEDICATIONS  (STANDING):  aspirin enteric coated 81 milliGRAM(s) Oral daily  atorvastatin 40 milliGRAM(s) Oral at bedtime  chlorhexidine 2% Cloths 1 Application(s) Topical <User Schedule>  divalproex  milliGRAM(s) Oral <User Schedule>  enoxaparin Injectable 40 milliGRAM(s) SubCutaneous every 24 hours    MEDICATIONS  (PRN):      ALLERGIES:  Allergies    No Known Allergies    Intolerances        LABS:                        13.1   6.95  )-----------( 101      ( 12 Jun 2023 04:45 )             41.6     06-12    140  |  106  |  14  ----------------------------<  115<H>  4.1   |  22  |  0.92    Ca    9.1      12 Jun 2023 04:45  Phos  3.0     06-12  Mg     2.00     06-12    TPro  6.5  /  Alb  4.0  /  TBili  0.5  /  DBili  x   /  AST  18  /  ALT  11  /  AlkPhos  32<L>  06-12    PT/INR - ( 12 Jun 2023 04:45 )   PT: 12.2 sec;   INR: 1.05 ratio         PTT - ( 12 Jun 2023 04:45 )  PTT:24.2 sec      CAPILLARY BLOOD GLUCOSE      POCT Blood Glucose.: 114 mg/dL (10 Onur 2023 21:23)      RADIOLOGY & ADDITIONAL TESTS: Reviewed.

## 2023-06-13 LAB
ALBUMIN SERPL ELPH-MCNC: 3.9 G/DL — SIGNIFICANT CHANGE UP (ref 3.3–5)
ALP SERPL-CCNC: 32 U/L — LOW (ref 40–120)
ALT FLD-CCNC: 15 U/L — SIGNIFICANT CHANGE UP (ref 4–41)
ANION GAP SERPL CALC-SCNC: 12 MMOL/L — SIGNIFICANT CHANGE UP (ref 7–14)
APTT BLD: 27.6 SEC — SIGNIFICANT CHANGE UP (ref 27–36.3)
AST SERPL-CCNC: 18 U/L — SIGNIFICANT CHANGE UP (ref 4–40)
BILIRUB SERPL-MCNC: 0.4 MG/DL — SIGNIFICANT CHANGE UP (ref 0.2–1.2)
BUN SERPL-MCNC: 14 MG/DL — SIGNIFICANT CHANGE UP (ref 7–23)
CALCIUM SERPL-MCNC: 8.8 MG/DL — SIGNIFICANT CHANGE UP (ref 8.4–10.5)
CHLORIDE SERPL-SCNC: 105 MMOL/L — SIGNIFICANT CHANGE UP (ref 98–107)
CO2 SERPL-SCNC: 22 MMOL/L — SIGNIFICANT CHANGE UP (ref 22–31)
CREAT SERPL-MCNC: 0.94 MG/DL — SIGNIFICANT CHANGE UP (ref 0.5–1.3)
EGFR: 84 ML/MIN/1.73M2 — SIGNIFICANT CHANGE UP
GLUCOSE SERPL-MCNC: 109 MG/DL — HIGH (ref 70–99)
HCT VFR BLD CALC: 42 % — SIGNIFICANT CHANGE UP (ref 39–50)
HGB BLD-MCNC: 13.4 G/DL — SIGNIFICANT CHANGE UP (ref 13–17)
INR BLD: 1 RATIO — SIGNIFICANT CHANGE UP (ref 0.88–1.16)
MAGNESIUM SERPL-MCNC: 2 MG/DL — SIGNIFICANT CHANGE UP (ref 1.6–2.6)
MCHC RBC-ENTMCNC: 29.4 PG — SIGNIFICANT CHANGE UP (ref 27–34)
MCHC RBC-ENTMCNC: 31.9 GM/DL — LOW (ref 32–36)
MCV RBC AUTO: 92.1 FL — SIGNIFICANT CHANGE UP (ref 80–100)
NRBC # BLD: 0 /100 WBCS — SIGNIFICANT CHANGE UP (ref 0–0)
NRBC # FLD: 0 K/UL — SIGNIFICANT CHANGE UP (ref 0–0)
PHOSPHATE SERPL-MCNC: 3.8 MG/DL — SIGNIFICANT CHANGE UP (ref 2.5–4.5)
PLATELET # BLD AUTO: 110 K/UL — LOW (ref 150–400)
POTASSIUM SERPL-MCNC: 4.5 MMOL/L — SIGNIFICANT CHANGE UP (ref 3.5–5.3)
POTASSIUM SERPL-SCNC: 4.5 MMOL/L — SIGNIFICANT CHANGE UP (ref 3.5–5.3)
PROT SERPL-MCNC: 6.3 G/DL — SIGNIFICANT CHANGE UP (ref 6–8.3)
PROTHROM AB SERPL-ACNC: 11.6 SEC — SIGNIFICANT CHANGE UP (ref 10.5–13.4)
RBC # BLD: 4.56 M/UL — SIGNIFICANT CHANGE UP (ref 4.2–5.8)
RBC # FLD: 14.7 % — HIGH (ref 10.3–14.5)
SODIUM SERPL-SCNC: 139 MMOL/L — SIGNIFICANT CHANGE UP (ref 135–145)
WBC # BLD: 7.76 K/UL — SIGNIFICANT CHANGE UP (ref 3.8–10.5)
WBC # FLD AUTO: 7.76 K/UL — SIGNIFICANT CHANGE UP (ref 3.8–10.5)

## 2023-06-13 PROCEDURE — 70551 MRI BRAIN STEM W/O DYE: CPT | Mod: 26

## 2023-06-13 PROCEDURE — 99233 SBSQ HOSP IP/OBS HIGH 50: CPT | Mod: GC

## 2023-06-13 RX ADMIN — DIVALPROEX SODIUM 500 MILLIGRAM(S): 500 TABLET, DELAYED RELEASE ORAL at 05:07

## 2023-06-13 RX ADMIN — ATORVASTATIN CALCIUM 40 MILLIGRAM(S): 80 TABLET, FILM COATED ORAL at 21:02

## 2023-06-13 RX ADMIN — Medication 81 MILLIGRAM(S): at 11:19

## 2023-06-13 RX ADMIN — CHLORHEXIDINE GLUCONATE 1 APPLICATION(S): 213 SOLUTION TOPICAL at 05:07

## 2023-06-13 RX ADMIN — ENOXAPARIN SODIUM 40 MILLIGRAM(S): 100 INJECTION SUBCUTANEOUS at 05:07

## 2023-06-13 RX ADMIN — DIVALPROEX SODIUM 500 MILLIGRAM(S): 500 TABLET, DELAYED RELEASE ORAL at 17:58

## 2023-06-13 NOTE — PROGRESS NOTE ADULT - ASSESSMENT
78 y/o M with PMH of HTN and seizure disorder BIBEMS after developing slurred speech, s/p code stroke, admitted to MICU for q1 neuro checks.      NEUROLOGY  #Slurred speech, s/p code stroke  Etiology possibly 2/2 ischemic stroke, pt improved s/p TPA at 22:48 PM on 6/10/23, however still has some residual slurred speech. Etiology also possibly 2/2 seizure. CT Head, CTA H/N remarkable for small area of prolonged Tmax at the left parieto-occipital junction is of uncertain clinical significance, possibly artifact.  -repeat CT Head at 24 hours negative  -bedside dysphagia screen passed, diet resumed as below  - 24h stability cth neg, ok to start asa and lovenox as below  -continue atorvastatin 40 mg daily  -pending MRI Brain w/o con to eval for acute ischemic stroke, checklist performed  -TTE, EKG, CXR unremarkable though TTE without bubble, will d/w neuro to see if repeat needed for bubble  -monitor on telemetry  -Permissive HTN up to 180/105, cardene gtt PRN  -Aspiration, fall precautions  -continue PT/OT  -24h EEG per neurology does not need resumption, enough captured prior to pause for ct.      PULMONARY  #MARINO, satting well on RA    CARDIOVASCULAR  #MARINO  - permissive HTN as above  - ASA 81 daily  - F/u with neuro if TTE with bubble needed or if current TTE sufficient    RENAL  #MARINO    GASTROINTESTINAL  #MARINO  - Diet: dash, passed dysphagia screen    INFECTIOUS DISEASE  #MARINO    ENDOCRINE  #MARINO  -A1C 5.8    HEME  #MARINO  - Lovenox dvt ppx    FLUIDS/ELECTROLYTES/NUTRITION  # Diet: passed dysphagia screen, dash diet    PROPHYLAXIS  # DVT: lovenox    ETHICS:  Full code 78 y/o M with PMH of HTN and seizure disorder BIBEMS after developing slurred speech, s/p code stroke, admitted to MICU for q1 neuro checks.      NEUROLOGY  #Slurred speech, s/p code stroke  Etiology possibly 2/2 ischemic stroke, pt improved s/p TPA at 22:48 PM on 6/10/23, however still has some residual slurred speech. Etiology also possibly 2/2 seizure. CT Head, CTA H/N remarkable for small area of prolonged Tmax at the left parieto-occipital junction is of uncertain clinical significance, possibly artifact.  - 24h stability cth neg, ok to start asa and lovenox as below  -continue atorvastatin 40 mg daily  -pending MRI Brain w/o con to eval for acute ischemic stroke, checklist performed, MRI contacted for timing  -TTE, EKG, CXR unremarkable  -monitor on telemetry for now, with 3d no arrhythmic events will discuss with neuro if continued tele needed  -Permissive HTN up to 180/105, cardene gtt PRN  -Aspiration, fall precautions  -continue PT/OT  -24h EEG per neurology does not need resumption, enough captured prior to pause for ct.      PULMONARY  #MARINO, satting well on RA    CARDIOVASCULAR  #MARINO  - permissive HTN as above  - ASA 81 daily  - F/u with neuro if TTE with bubble needed or if current TTE sufficient    RENAL  #MARINO    GASTROINTESTINAL  #MARINO  - Diet: dash, passed dysphagia screen    INFECTIOUS DISEASE  #MARINO    ENDOCRINE  #MARINO  -A1C 5.8    HEME  #MARINO  - Lovenox dvt ppx    FLUIDS/ELECTROLYTES/NUTRITION  # Diet: passed dysphagia screen, dash diet    PROPHYLAXIS  # DVT: lovenox    ETHICS:  Full code

## 2023-06-13 NOTE — DIETITIAN INITIAL EVALUATION ADULT - WEIGHT USED FOR CALCULATIONS
Physical Therapy Visit    Visit Type: Daily Treatment Note  Visit: 9  Referring Provider: Abena Luis PA-C  Medical Diagnosis (from order): Diagnosis Information    Diagnosis  786.52 (ICD-9-CM) - R07.89 (ICD-10-CM) - Anterior chest wall pain         SUBJECTIVE                                                                                                               Patient reports that right wrist is bugging her more from picking up and down her grandchildren. Has been wearing her splint that helps intermittently. Continues to have right groin discomfort due to irritation and is on antibiotics due to swelling. Patient states that chest area pain has been better and only felt a couple of times the last few weeks. Has continued to have more stress due to external forces.   Functional Change: Reaching a little easier  Opening and grabbing more with LUE    Pain / Symptoms  - Pain rating (out of 10): Current: 1       OBJECTIVE                                                                                                                         Joint Play   Cervical Spine   - C2-C3: • Left WFL • Right WFL  - C3-C4: • Left WFL • Right WFL  - C4-C5: • Left WFL • Right WFL  - C5-C6: • Left WFL • Right WFL  - C6-C7: • Left hypomobile • Right WFL  - C7-T1: • Left hypomobile • Right hypomobile  Thoracic Segmental Testing   - T1-T2: hypomobile                  Treatment     Manual Therapy   Mobilization of 1st rib left with long lever  Mobilization of costopleural and vertebropleural ligament left   Mobilization of interspinous and intertransverse ligament C6-T1 left   Mobilization of costotransverse ligament T1 left   Bony contour fascial release cervicothoracic junction spinous processes  Myofascial release of pec minor left   Mobilization of conoid ligament  GH joint posterior and inferior mobilization grade II-III left   Posterior-anterior mobilization of C6/7, T1 hector transverse processes        Skilled input: verbal  instruction/cues, tactile instruction/cues, posture correction, as detailed above, demonstration and facilitation    Writer verbally educated and received verbal consent for hand placement, positioning of patient, and techniques to be performed today from patient for hand placement and palpation for techniques, therapist position for techniques and clothing adjustments for techniques as described above and how they are pertinent to the patient's plan of care.  Home Exercise Program  Pec stretch in door way left   Wall slide shoulder flexion   Sidelying 1 pound   -external rotation  -flexion    Supine serratus press    Seated row red band      ASSESSMENT                                                                                                            Patient is displaying improved tissue and joint mobility through left shoulder girdle with mild restrictions through cervicothoracic junction. She is tolerating daily activities at home with less pain, but does have other issues through right wrist and hip that are more dominate at this time. Plan is to discharge next session with HEP.  Education:   - Results of above outlined education: Verbalizes understanding    PLAN                                                                                                                           Suggestions for next session as indicated: Progress per plan of care, discharge next session       Therapy procedure time and total treatment time can be found documented on the Time Entry flowsheet     dry weight current weight

## 2023-06-13 NOTE — SPEECH LANGUAGE PATHOLOGY EVALUATION - COMMENTS
MICU note 6/13 "76 y/o M with PMH of HTN and seizure disorder BIBEMS after developing slurred speech, s/p code stroke, admitted to MICU for q1 neuro checks."    Patient seen at chairside in the MICU this AM for an initial assessment of speech/language. Patient states he had trouble with his speech even as child. Patient reporting difficulty with his speech prior to admission characterized by "I stutter when I get nervous". Patient denies word finding difficulty, however, states his speech is slurred. Upon further clinician questioning patient noted to eat 100% of his breakfast tray and stating no difficulty with swallow. RN reporting patient tolerating regular solids and thin liquids without difficulty. Patient will benefit from a comprehensive speech/language assessment pending discharge plans (i.e. rehab vs home care vs [facility] [phone]).

## 2023-06-13 NOTE — SPEECH LANGUAGE PATHOLOGY EVALUATION - SLP DIAGNOSIS
Mild expressive language deficits and functional receptive language skills marked by fluent output, functional auditory comprehension, mild repetition deficits at the phrase/sentence levels, and mild anomia during conversational speech. Patient exhibited phonemic paraphasia' s during conversational speech. Motor speech is marked by disfluent speech output, reduced rate, prosody and diadochokentic speech. Vocal volume is appropriate.

## 2023-06-13 NOTE — PROGRESS NOTE ADULT - SUBJECTIVE AND OBJECTIVE BOX
Leonardo Weber MD  Emergency Medicine PGY 1      MICU PROGRESS NOTE     OVERNIGHT EVENTS: NAEON    SUBJECTIVE: Patient seen and examined at bedside. No complaints this morning, feeling well without new speech symptoms or other new neurologic complaints. Patient denies fever, chills, SOB, chest pain, N/V/D.    OBJECTIVE:    VITAL SIGNS:  ICU Vital Signs Last 24 Hrs  T(C): 36.3 (13 Jun 2023 04:00), Max: 36.4 (12 Jun 2023 20:00)  T(F): 97.4 (13 Jun 2023 04:00), Max: 97.5 (12 Jun 2023 20:00)  HR: 57 (13 Jun 2023 04:00) (55 - 72)  BP: 128/62 (13 Jun 2023 04:00) (103/58 - 137/85)  BP(mean): 78 (13 Jun 2023 04:00) (68 - 98)  ABP: --  ABP(mean): --  RR: 20 (13 Jun 2023 04:00) (13 - 21)  SpO2: 100% (13 Jun 2023 04:00) (95% - 100%)    O2 Parameters below as of 13 Jun 2023 04:00  Patient On (Oxygen Delivery Method): room air              06-12 @ 07:01  -  06-13 @ 07:00  --------------------------------------------------------  IN: 740 mL / OUT: 1565 mL / NET: -825 mL        =================PHYSICAL EXAM=================    CONSTITUTIONAL: Well-developed; well-nourished; in no acute distress.   SKIN: warm, dry  HEAD: Normocephalic; atraumatic.  EYES: no conjunctival injection. PERRL. EOMI without pain. no scleral icterus  ENT: No nasal discharge; airway clear.  NECK: Supple; non tender.  CARD: S1, S2 normal; no murmurs, gallops, or rubs. Regular rate and rhythm.   RESP: No wheezes, rales or rhonchi. Good air movement bilaterally.   ABD: soft ntnd, no guarding, no distention, no rigidity.   EXT: No cyanosis or edema.   NEURO: alert, oriented to ppte, speech with stuttering, cn 2-12 intact, motor strength 5/5 throughout, sensation intact throughout, coordination intact  PSYCH: Cooperative, appropriate.     =================================================    LABS:                        13.4   7.76  )-----------( 110      ( 13 Jun 2023 05:00 )             42.0     Auto Eosinophil # x     / Auto Eosinophil % x     / Auto Neutrophil # x     / Auto Neutrophil % x     / BANDS % x                            13.1   6.95  )-----------( 101      ( 12 Jun 2023 04:45 )             41.6     Auto Eosinophil # x     / Auto Eosinophil % x     / Auto Neutrophil # x     / Auto Neutrophil % x     / BANDS % x        06-13    139  |  105  |  14  ----------------------------<  109<H>  4.5   |  22  |  0.94  06-12    140  |  106  |  14  ----------------------------<  115<H>  4.1   |  22  |  0.92    Ca    8.8      13 Jun 2023 05:00  Mg     2.00     06-13  Phos  3.8     06-13  TPro  6.3  /  Alb  3.9  /  TBili  0.4  /  DBili  x   /  AST  18  /  ALT  15  /  AlkPhos  32<L>  06-13  TPro  6.5  /  Alb  4.0  /  TBili  0.5  /  DBili  x   /  AST  18  /  ALT  11  /  AlkPhos  32<L>  06-12    PT/INR - ( 13 Jun 2023 05:00 )   PT: 11.6 sec;   INR: 1.00 ratio         PTT - ( 13 Jun 2023 05:00 )  PTT:27.6 sec                 MEDICATIONS:  MEDICATIONS  (STANDING):  aspirin enteric coated 81 milliGRAM(s) Oral daily  atorvastatin 40 milliGRAM(s) Oral at bedtime  chlorhexidine 2% Cloths 1 Application(s) Topical <User Schedule>  divalproex  milliGRAM(s) Oral <User Schedule>  enoxaparin Injectable 40 milliGRAM(s) SubCutaneous every 24 hours    MEDICATIONS  (PRN):      ALLERGIES:  Allergies    No Known Allergies    Intolerances        LABS:                        13.4   7.76  )-----------( 110      ( 13 Jun 2023 05:00 )             42.0     06-13    139  |  105  |  14  ----------------------------<  109<H>  4.5   |  22  |  0.94    Ca    8.8      13 Jun 2023 05:00  Phos  3.8     06-13  Mg     2.00     06-13    TPro  6.3  /  Alb  3.9  /  TBili  0.4  /  DBili  x   /  AST  18  /  ALT  15  /  AlkPhos  32<L>  06-13    PT/INR - ( 13 Jun 2023 05:00 )   PT: 11.6 sec;   INR: 1.00 ratio         PTT - ( 13 Jun 2023 05:00 )  PTT:27.6 sec      CAPILLARY BLOOD GLUCOSE          RADIOLOGY & ADDITIONAL TESTS: Reviewed.

## 2023-06-14 ENCOUNTER — TRANSCRIPTION ENCOUNTER (OUTPATIENT)
Age: 78
End: 2023-06-14

## 2023-06-14 VITALS
OXYGEN SATURATION: 97 % | DIASTOLIC BLOOD PRESSURE: 76 MMHG | HEART RATE: 78 BPM | SYSTOLIC BLOOD PRESSURE: 104 MMHG | TEMPERATURE: 98 F | RESPIRATION RATE: 19 BRPM

## 2023-06-14 LAB
ALBUMIN SERPL ELPH-MCNC: 4 G/DL — SIGNIFICANT CHANGE UP (ref 3.3–5)
ALP SERPL-CCNC: 34 U/L — LOW (ref 40–120)
ALT FLD-CCNC: 12 U/L — SIGNIFICANT CHANGE UP (ref 4–41)
ANION GAP SERPL CALC-SCNC: 13 MMOL/L — SIGNIFICANT CHANGE UP (ref 7–14)
APTT BLD: 24.5 SEC — LOW (ref 27–36.3)
AST SERPL-CCNC: 17 U/L — SIGNIFICANT CHANGE UP (ref 4–40)
BILIRUB SERPL-MCNC: 0.6 MG/DL — SIGNIFICANT CHANGE UP (ref 0.2–1.2)
BUN SERPL-MCNC: 18 MG/DL — SIGNIFICANT CHANGE UP (ref 7–23)
CALCIUM SERPL-MCNC: 9 MG/DL — SIGNIFICANT CHANGE UP (ref 8.4–10.5)
CHLORIDE SERPL-SCNC: 102 MMOL/L — SIGNIFICANT CHANGE UP (ref 98–107)
CO2 SERPL-SCNC: 24 MMOL/L — SIGNIFICANT CHANGE UP (ref 22–31)
CREAT SERPL-MCNC: 0.97 MG/DL — SIGNIFICANT CHANGE UP (ref 0.5–1.3)
EGFR: 81 ML/MIN/1.73M2 — SIGNIFICANT CHANGE UP
GLUCOSE SERPL-MCNC: 106 MG/DL — HIGH (ref 70–99)
HCT VFR BLD CALC: 41.2 % — SIGNIFICANT CHANGE UP (ref 39–50)
HGB BLD-MCNC: 13.7 G/DL — SIGNIFICANT CHANGE UP (ref 13–17)
INR BLD: 1.04 RATIO — SIGNIFICANT CHANGE UP (ref 0.88–1.16)
MAGNESIUM SERPL-MCNC: 2 MG/DL — SIGNIFICANT CHANGE UP (ref 1.6–2.6)
MCHC RBC-ENTMCNC: 29.9 PG — SIGNIFICANT CHANGE UP (ref 27–34)
MCHC RBC-ENTMCNC: 33.3 GM/DL — SIGNIFICANT CHANGE UP (ref 32–36)
MCV RBC AUTO: 90 FL — SIGNIFICANT CHANGE UP (ref 80–100)
NRBC # BLD: 0 /100 WBCS — SIGNIFICANT CHANGE UP (ref 0–0)
NRBC # FLD: 0 K/UL — SIGNIFICANT CHANGE UP (ref 0–0)
PHOSPHATE SERPL-MCNC: 3.9 MG/DL — SIGNIFICANT CHANGE UP (ref 2.5–4.5)
PLATELET # BLD AUTO: 117 K/UL — LOW (ref 150–400)
POTASSIUM SERPL-MCNC: 4.4 MMOL/L — SIGNIFICANT CHANGE UP (ref 3.5–5.3)
POTASSIUM SERPL-SCNC: 4.4 MMOL/L — SIGNIFICANT CHANGE UP (ref 3.5–5.3)
PROT SERPL-MCNC: 6.7 G/DL — SIGNIFICANT CHANGE UP (ref 6–8.3)
PROTHROM AB SERPL-ACNC: 12.1 SEC — SIGNIFICANT CHANGE UP (ref 10.5–13.4)
RBC # BLD: 4.58 M/UL — SIGNIFICANT CHANGE UP (ref 4.2–5.8)
RBC # FLD: 14.6 % — HIGH (ref 10.3–14.5)
SODIUM SERPL-SCNC: 139 MMOL/L — SIGNIFICANT CHANGE UP (ref 135–145)
WBC # BLD: 8.61 K/UL — SIGNIFICANT CHANGE UP (ref 3.8–10.5)
WBC # FLD AUTO: 8.61 K/UL — SIGNIFICANT CHANGE UP (ref 3.8–10.5)

## 2023-06-14 PROCEDURE — 99233 SBSQ HOSP IP/OBS HIGH 50: CPT | Mod: GC

## 2023-06-14 PROCEDURE — 99232 SBSQ HOSP IP/OBS MODERATE 35: CPT

## 2023-06-14 RX ORDER — ATORVASTATIN CALCIUM 80 MG/1
1 TABLET, FILM COATED ORAL
Qty: 30 | Refills: 0
Start: 2023-06-14 | End: 2023-07-13

## 2023-06-14 RX ORDER — ASPIRIN/CALCIUM CARB/MAGNESIUM 324 MG
1 TABLET ORAL
Qty: 30 | Refills: 0
Start: 2023-06-14 | End: 2023-07-13

## 2023-06-14 RX ORDER — LACOSAMIDE 50 MG/1
1 TABLET ORAL
Qty: 60 | Refills: 0
Start: 2023-06-14 | End: 2023-07-13

## 2023-06-14 RX ORDER — SIMVASTATIN 20 MG/1
1 TABLET, FILM COATED ORAL
Qty: 0 | Refills: 0 | DISCHARGE

## 2023-06-14 RX ADMIN — ENOXAPARIN SODIUM 40 MILLIGRAM(S): 100 INJECTION SUBCUTANEOUS at 05:06

## 2023-06-14 RX ADMIN — Medication 81 MILLIGRAM(S): at 12:28

## 2023-06-14 RX ADMIN — DIVALPROEX SODIUM 500 MILLIGRAM(S): 500 TABLET, DELAYED RELEASE ORAL at 05:05

## 2023-06-14 RX ADMIN — CHLORHEXIDINE GLUCONATE 1 APPLICATION(S): 213 SOLUTION TOPICAL at 05:05

## 2023-06-14 NOTE — DISCHARGE NOTE PROVIDER - CARE PROVIDER_API CALL
Gonzalo Martin J  Cardiovascular Disease  [address] [place] [address]  Phone: [phone]  Fax: [phone]  Established Patient  Follow Up Time: 1 week

## 2023-06-14 NOTE — PROGRESS NOTE ADULT - REASON FOR ADMISSION
c/f stroke vs seizure

## 2023-06-14 NOTE — DISCHARGE NOTE PROVIDER - NSDCMRMEDTOKEN_GEN_ALL_CORE_FT
Depakote  mg oral tablet, extended release: 2 tab(s) orally 2 times a day  lisinopril 10 mg oral tablet: 1 tab(s) orally once a day  simvastatin 20 mg oral tablet: 1 tab(s) orally once a day (at bedtime)  Vimpat 100 mg oral tablet: 1 tab(s) orally 2 times a day MDD: 2   aspirin 81 mg oral delayed release tablet: 1 tab(s) orally once a day  atorvastatin 40 mg oral tablet: 1 tab(s) orally once a day (at bedtime)  Depakote  mg oral tablet, extended release: 2 tab(s) orally 2 times a day  lisinopril 10 mg oral tablet: 1 tab(s) orally once a day  Vimpat 100 mg oral tablet: 1 tab(s) orally 2 times a day MDD: 2

## 2023-06-14 NOTE — PROGRESS NOTE ADULT - NS ATTEND AMEND GEN_ALL_CORE FT
I saw and examined the patient during AM stroke rounds.    Patient reports doing well overall.  He has not had any return of the symptoms with language or R sided weakness.      On exam:   Awake, alert, oriented to day of week, month, year, and hospital.  Normal naming, and fluency.    Pupils 3-2mm, symmetric, full VF's, normal OEM, no facial weakness, no dysarthria, tongue midline, palate symmetric.   MOTOR: normal bulk and tone, no drift.  5/5 throughout to confrontation.   SENSORY: intact symmetrically to light touch  COORDINATION: normal fNF  REFLEXES: trace BB, Br, patellar, achilles.   GAIT: walking in place, negative Rhomberg.      MRI brain images reviewed: no diffusion restriction.  No abnormal FLAIR hyperintensities.   CTA H&N images reviewed: No significant cervical carotid stenosis.  No large vessel occlusion.     AP: 77 year old man with epilepsy, HTN, HLD, presenting with aphasia, and R arm and leg weakness, s/p TNK.  Symptoms resolving several hours after presentation and post TNK.  Today, normal neurological exam.  Imaging without any evidence of stroke.  His vessels and cardiac monitoring have been normal.    Suspect may have been a focal seizure with impaired awareness, and post-ictal weakness.  TIA cannot be completely ruled out.  Case was discussed with the patient's outpatient neurologist/epileptologist.   -would continue on ASA and statin as TIA cannot be completely ruled out  -would continue on his current AED, and add lacosamide 100mg BID.  Can uptitrate in the outpatient setting.  EKG was reviewed with the primary team.   -discussed driving restrictions with the patient.  he understands that he is not cleared from neurology to resume driving for a period of 1 year from today.  Driving privileges can be granted after 1 year, provided her remains seizure free.   -follow up with his neurologist, Dr. Mariano.
I saw and examined the patient with stroke team on AM stroke rounds with Torey López.     Patient presented on 6/10 with acute onset dysarthria, aphasia, and weakness in R arm and leg, received TNK in ED.    Reports his symptoms resolved when he arrived in the ICU on evening of presentation.  Has very little recollection of the event, other than remembering EMS arriving, and not wanting to go to hospital.    Reports he has had 2 seizures.  One was about 2 years ago,a nd another more remotely.  Witnessed both times, told he was shaking with both arms.  He remembers tongue bite and bleeding from mouth after waking up.     On exam:   Awake, alert, oriented, follows commands, gives clear history, normal naming, fluency.    Pupils 3-2mm, symmetric, full VF's, normal eye movements, no nystagmus, no facial weakness, facial sensation symmetric, hearing intact, SCM and trapezius 5/5.   MOTOR: normal bulk and tone.  No drift in upper or lower extremities.   COORDINATION: no ataxia  SENSORY: intact symmetrically to light touch, no extinction.     CTH images reviewed: no hemorrhage.  No large territorial infarct.  Diffuse cortical atrophy.   CTA H&N images reviewed: no significant cervical carotid or vertebral stenosis.  No large vessel occlusion.   Echo: grossly normal LV function.  Normal LA size. Mitral annular calcification    EEG: normal.    AP: 77 year old man with HTN, HLD, focal epilepsy, presenting with speech disturbance and R sided weakness, s/p TNK.  On exam today, no deficits.  Imaging unremarkable at this time.  Echo and EEG normal.    Unclear etiology at this point.  Stroke is possible, but ictal/interictal phenomenon is also a possibility.     -MRI brain  -ASA 81mg daily

## 2023-06-14 NOTE — DISCHARGE NOTE PROVIDER - NSDCCPCAREPLAN_GEN_ALL_CORE_FT
PRINCIPAL DISCHARGE DIAGNOSIS  Diagnosis: CVA (cerebrovascular accident)  Assessment and Plan of Treatment: You came into the hospital for slurred speech and were evaluated by neurology (brain specialists). Due to concerns for a possible stroke (ie. blockages of blood vessels in the brain); you were admitted to the medical intensive care unit (MICU). CT and MRI image studies were performed and were negative for stroke. Additionally, EEG was performed to evaluate for possibilty of seizures but the EEG report was negative. Physical therapy was consulted and recommended to additional physical therapy needs.   It is most likely that your symptoms were likely results of a transischemic attack (TIA). TIA occurs when the blood vessels in the brain are intermittently blocked and as a result; stroke-like symptoms occur. Having a TIA puts you at an increased risk of stroke and as such, you will need to take medication (ie. aspirin).   Please do NOT drive a motor vehicle for 1 year as you are at increased risk for stroke.   Please follow up with Neurology and your PCP in 1 week to assess your recovery.

## 2023-06-14 NOTE — DISCHARGE NOTE PROVIDER - NSDCFUSCHEDAPPT_GEN_ALL_CORE_FT
Panfilo Mariano  [facility]  NEUROLOGY [facility]  Scheduled Appointment: 06/21/2023    
Skin normal color for race, warm, dry and intact. No evidence of rash.

## 2023-06-14 NOTE — DISCHARGE NOTE NURSING/CASE MANAGEMENT/SOCIAL WORK - NSDCPEPTSTRK_GEN_ALL_CORE
Call 911 for stroke/Need for follow up after discharge/Prescribed medications/Risk factors for stroke/Stroke education booklet/Stroke support groups for patients, families, and friends/Stroke warning signs and symptoms/Signs and symptoms of stroke
[phone]

## 2023-06-14 NOTE — PROGRESS NOTE ADULT - ASSESSMENT
76 y/o M with PMH of HTN and seizure disorder BIBEMS after developing slurred speech, s/p code stroke, admitted to MICU for q1 neuro checks.      NEUROLOGY  #Slurred speech, s/p code stroke  Etiology possibly 2/2 ischemic stroke, pt improved s/p TPA at 22:48 PM on 6/10/23, however still has some residual slurred speech. Etiology also possibly 2/2 seizure. CT Head, CTA H/N remarkable for small area of prolonged Tmax at the left parieto-occipital junction is of uncertain clinical significance, possibly artifact.  - 24h stability cth neg, ok to start asa and lovenox as below  -continue atorvastatin 40 mg daily  -f/u MRI  -neuro consulted, appreciate recs, with MRI done consider dispo for outpt eval vs continued inpt eval  -TTE, EKG, CXR unremarkable  -monitor on telemetry for now, with 3d no arrhythmic events will discuss with neuro if continued tele needed  -Permissive HTN up to 180/105, cardene gtt PRN  -Aspiration, fall precautions  -continue PT/OT  -24h EEG per neurology does not need resumption, enough captured prior to pause for ct.      PULMONARY  #MARINO, satting well on RA    CARDIOVASCULAR  #MARINO  - permissive HTN as above  - ASA 81 daily  - F/u with neuro if TTE with bubble needed or if current TTE sufficient    RENAL  #MARINO    GASTROINTESTINAL  #MARINO  - Diet: dash, passed dysphagia screen    INFECTIOUS DISEASE  #MARINO    ENDOCRINE  #MARINO  -A1C 5.8    HEME  #MARINO  - Lovenox dvt ppx    FLUIDS/ELECTROLYTES/NUTRITION  # Diet: passed dysphagia screen, dash diet    PROPHYLAXIS  # DVT: lovenox    ETHICS:  Full code 76 y/o M with PMH of HTN and seizure disorder BIBEMS after developing slurred speech, s/p code stroke, admitted to MICU for q1 neuro checks.      NEUROLOGY  #Slurred speech, s/p code stroke  Etiology possibly 2/2 ischemic stroke, pt improved s/p TPA at 22:48 PM on 6/10/23, however still has some residual slurred speech. Etiology also possibly 2/2 seizure. CT Head, CTA H/N remarkable for small area of prolonged Tmax at the left parieto-occipital junction is of uncertain clinical significance, possibly artifact.  - 24h stability cth neg, ok to start asa and lovenox as below  -continue atorvastatin 40 mg daily  -MRI without acute or specific findings  -TTE, EKG, CXR unremarkable  -monitor on telemetry for now, with 3d no arrhythmic events will discuss with neuro if continued tele needed  -Permissive HTN up to 180/105, cardene gtt PRN  -Aspiration, fall precautions  -continue PT/OT  -24h EEG per neurology does not need resumption, enough captured prior to pause for ct.  -Discussed with neurology, likely focal seizure with impaired awareness with post-ictal Alf paralysis vs TIA, now resolved. No further inpt management      PULMONARY  #MARINO, satting well on RA    CARDIOVASCULAR  #MARINO  - permissive HTN as above  - ASA 81 daily    RENAL  #MARINO    GASTROINTESTINAL  #MARINO  - Diet: dash, passed dysphagia screen    INFECTIOUS DISEASE  #MARINO    ENDOCRINE  #MARINO  -A1C 5.8    HEME  #MARINO  - Lovenox dvt ppx    FLUIDS/ELECTROLYTES/NUTRITION  # Diet: passed dysphagia screen, dash diet    PROPHYLAXIS  # DVT: lovenox    ETHICS:  Full code

## 2023-06-14 NOTE — DISCHARGE NOTE PROVIDER - HOSPITAL COURSE
77M w/ PMH of HTN and seizure disorder BIBEMS after developing slurred speech last night. Pt's girlfriend at bedside, pt does not remember episode, however girlfriend reports that she was watching a TV show with him about aliens and pt suddenly developed slurred speech, made gasping sounds and raspy noises, so girlfriend became concerned and called 911. They told her to leave him on the couch, so she waited until EMS arrived and took him to the ED. She did not note any rhythmic jerking or tonic clonic movements, reports he was recently hospitalized for a pneumonia but does not remember for what exactly, reports his last seizure was in 2020. Per chart, pt was apparently not able to identify objects as per EMS, last known normal offiically 8 PM on 6/10/23, had R > L LE drift in ED during code stroke. Pt's daughter Chasity mentioned pt has had stuttering since childhood, but per girlfriend at bedside, this was different than usual. Girlfriend reported improvement after TPA administration.     Pt's seizure history: sudden episodes of impaired awareness preceded by apneic gasps. Had EEG 10/25/20 showing left temporal sharp waves, two subtle left hemispheric electrographic seizures evolving over temporal region.    Pt transferred to MICU for neuro checks. States he has had significant improvement to speech. Pt while here obtained 24h EEG and 24h CTH stability scan, followed by neuro. CTH stable and neg. vEEG interrupted for CTH after approx 12h. MRI head performed and without acute infarcation. Lovenox and ASA resumed. Had no worsening symptoms or new acute neurologic deficits. PT consulted and recommended no skilled PT needs. Following results of imaging and per discussion with neurology, pt hemodynamically stable for discharge.

## 2023-06-14 NOTE — DIETITIAN INITIAL EVALUATION ADULT - PERTINENT MEDS FT
MEDICATIONS  (STANDING):  aspirin enteric coated 81 milliGRAM(s) Oral daily  atorvastatin 40 milliGRAM(s) Oral at bedtime  chlorhexidine 2% Cloths 1 Application(s) Topical <User Schedule>  divalproex  milliGRAM(s) Oral <User Schedule>  enoxaparin Injectable 40 milliGRAM(s) SubCutaneous every 24 hours

## 2023-06-14 NOTE — PROGRESS NOTE ADULT - ASSESSMENT
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 ED for abnormal speech. Patient accompanied by girlfriend in ED. Per girlfriend, patient was watching television when he starting drooling and had slurred speech. When patient was found by EMS, he was not able to identify objects. Patient's girlfriend unable to describe patient's typical seizures. Per EMS, last known normal 20:00 6/10/23. Code stroke called in the ED 6/10/23. During code stroke, patient seemingly improved. However, while testing for upper extremity drift, patient developed worsening RUE weakness, also found to have R > L LE drift. Patient also with worsening speech during code stroke. (See examination below.) Of note, patient's daughter Chasity later added that he has a history of stuttering since childhood. Patient's girlfriend at bedside reports that patient has never sounded like the way he does now. On chart review of outpatient and inpatient neurology records, patient's seizure semiology includes sudden episode of impaired awareness preceded by apneic gasps. EEG 10/25/20: left temporal sharp waves, two subtle left hemispheric electrographic seizures, evolving over the temporal region. Patient with allergy to levetiracetam. Per patient, he is currently on valproic acid BID. Patient denies missing doses of medications. Exam as above for 6/12. vEEG x 18 hours negative for seizure event. rCTH stable.     Impression: Transient speech disturbance a/w possible R hemiparesis (superimposed w/ ?generalized weakness) due to TIA, MRI negative for acute ischemic infarct. Given the poor recollection surrounding the episode, seizure w/ post ictal period is in the differential.    Recommendations  [x] MRI brain w/o contrast, results as noted above  [x] EEG negative   [x] C/w ASA 81 mg indefinitely  [] C/w VPA 500mg BID  [] Start Vimpat 100mg BID   [] Chemical DVT prophylaxis  [] Neurochecks: q4hr   [] BP goals: Maintain normotension, avoid hypotension   [] PT/OT evals pending for discharge   [] From neurovascular standpoint cleared for discharge once PT/OT cleared.   [] Diet: c/w DASH diet as tolerated   [] Follow up with Dr. Mariano in clinic.    [] No driving for 1 year per [facility]     Patient case discussed and seen with stroke attending, Dr. Vazquez.    Please call with questions: [id number]

## 2023-06-14 NOTE — DISCHARGE NOTE NURSING/CASE MANAGEMENT/SOCIAL WORK - NSDCPEFALRISK_GEN_ALL_CORE
For information on Fall & Injury Prevention, visit: [email or web address] OR  [email or web address] OR  https://www.cdc.gov/steadi/patient.html

## 2023-06-14 NOTE — DIETITIAN INITIAL EVALUATION ADULT - OTHER INFO
Spoke w RN.  Met with Pt.  He denies food allergies, nausea/vomiting/diarrhea/constipation, or issues with chewing/swallowing.  No significant weight changes PTA.    Informed Pt. of prescribed heart healthy diet.

## 2023-06-14 NOTE — DISCHARGE NOTE PROVIDER - NSDCFUADDAPPT_GEN_ALL_CORE_FT
Please follow up with Dr. Mariano (Neurology) on 6/21/23 to assess your recovery     Please follow up with Dr. Martin (PCP) in 1 week to assess your recovery and recent medication changes

## 2023-06-14 NOTE — DIETITIAN INITIAL EVALUATION ADULT - PERTINENT LABORATORY DATA
06-14    139  |  102  |  18  ----------------------------<  106<H>  4.4   |  24  |  0.97    Ca    9.0      14 Jun 2023 05:15  Phos  3.9     06-14  Mg     2.00     06-14    Cholesterol, Serum: 131 mg/dL (06.11.23 @ 03:00)  LDL Cholesterol Calculated: 77 mg/dL (06.11.23 @ 03:00)  HDL Cholesterol, Serum: 44 mg/dL (06.11.23 @ 03:00)  Triglycerides, Serum: 50 mg/dL (06.11.23 @ 03:00)    A1C with Estimated Average Glucose Result: 5.8 % (06-11-23 @ 03:00)  A1C with Estimated Average Glucose Result: 6.2 % (10-05-22 @ 06:00)

## 2023-06-14 NOTE — DIETITIAN INITIAL EVALUATION ADULT - REASON FOR ADMISSION
76yo M presenting w acute onset dysarthria, aphasia and weakness in R arm and leg.  Brought in by EMS, s/p code stroke and tPA with improvement. Etiology unclear, perhaps seizure vs stroke.

## 2023-06-14 NOTE — PROGRESS NOTE ADULT - SUBJECTIVE AND OBJECTIVE BOX
SUBJECTIVE: Patient feeling well, ready to go home.    INTERVAL HISTORY: No events overnight.  MRI done and reviewed.     PAST MEDICAL & SURGICAL HISTORY:  Hypertension  Hyperlipidemia  Focal epilepsy  S/P left rotator cuff repair  S/P right rotator cuff repair    FAMILY HISTORY:  FHx: lung cancer  Father due to tobacco use    SOCIAL HISTORY:   T/E/D:   Occupation:   Lives with:     MEDICATIONS (HOME):  Home Medications:  Depakote  mg oral tablet, extended release: 2 tab(s) orally 2 times a day (10 Onur 2023 23:34)  lisinopril 10 mg oral tablet: 1 tab(s) orally once a day (10 Onur 2023 23:34)    MEDICATIONS  (STANDING):  aspirin enteric coated 81 milliGRAM(s) Oral daily  atorvastatin 40 milliGRAM(s) Oral at bedtime  chlorhexidine 2% Cloths 1 Application(s) Topical <User Schedule>  divalproex  milliGRAM(s) Oral <User Schedule>  enoxaparin Injectable 40 milliGRAM(s) SubCutaneous every 24 hours    MEDICATIONS  (PRN):    ALLERGIES/INTOLERANCES:  Allergies  No Known Allergies    Intolerances    VITALS & EXAMINATION:  Vital Signs Last 24 Hrs  T(C): 36.4 (14 Jun 2023 12:00), Max: 36.7 (14 Jun 2023 00:00)  T(F): 97.5 (14 Jun 2023 12:00), Max: 98 (14 Jun 2023 00:00)  HR: 78 (14 Jun 2023 12:00) (67 - 78)  BP: 104/76 (14 Jun 2023 12:00) (102/78 - 139/90)  BP(mean): 84 (14 Jun 2023 12:00) (82 - 102)  RR: 19 (14 Jun 2023 12:00) (12 - 21)  SpO2: 97% (14 Jun 2023 12:00) (94% - 100%)    Parameters below as of 14 Jun 2023 08:21  Patient On (Oxygen Delivery Method): room air    General:  Constitutional: Male, appears stated age, in no apparent distress including pain  Head: Normocephalic & Atraumatic.  Respiratory: Breathing comfortably.    Neurological:  MS: Eyes open. Awake, alert, oriented to person, place, situation, time. Follows all commands.  Language: Speech is without dysarthria, occasionally has subtle stuttering, but is otherwise fluent with good repetition.  CNs: PERRL (R = 3mm, L = 3mm). VFF. EOMI no nystagmus. V1-3 intact to LT b/l. No facial asymmetry b/l, full eye closure strength b/l. Hearing grossly normal (rubbing fingers) b/l. Tongue midline, normal movements, no atrophy.   Motor: Normal muscle bulk & tone. No noticeable tremor. No drift in UE or LE b/l; strength 5/5 symmetric b/l. 	   Sensation: Intact to LT b/l throughout.   Cortical: Extinction on DSS (neglect): none  Coordination: No dysmetria to FTN b/l.   Gait: Deferred.     LABORATORY:  CBC                       13.7   8.61  )-----------( 117      ( 14 Jun 2023 05:15 )             41.2     Chem 06-14    139  |  102  |  18  ----------------------------<  106<H>  4.4   |  24  |  0.97    Ca    9.0      14 Jun 2023 05:15  Phos  3.9     06-14  Mg     2.00     06-14    TPro  6.7  /  Alb  4.0  /  TBili  0.6  /  DBili  x   /  AST  17  /  ALT  12  /  AlkPhos  34<L>  06-14    LFTs LIVER FUNCTIONS - ( 14 Jun 2023 05:15 )  Alb: 4.0 g/dL / Pro: 6.7 g/dL / ALK PHOS: 34 U/L / ALT: 12 U/L / AST: 17 U/L / GGT: x           Coagulopathy PT/INR - ( 14 Jun 2023 05:15 )   PT: 12.1 sec;   INR: 1.04 ratio         PTT - ( 14 Jun 2023 05:15 )  PTT:24.5 sec  Lipid Panel 06-11 Chol 131 LDL -- HDL 44 Trig 50  A1c   Cardiac enzymes     U/A   CSF  Immunological  Other    STUDIES & IMAGING:  Studies (EKG, EEG, EMG, etc):     Radiology (XR, CT, MR, U/S, TTE/ESTELLE):    MR Brain (10.24.20 @ 21:07: Age-appropriate involutional and ischemic gliotic changes. No acute infarcts, hemorrhage or mass. No abnormal enhancement.     CTA BRAIN: Patent intracranial circulation. No flow-limiting stenosis or occlusion.    CTA NECK: Patent cervical vasculature. No flow limiting stenosis or occlusion.    CT PERFUSION: No core infarct. Small area of prolonged Tmax at the left parieto-occipital junction is of uncertain clinical significance, possibly artifact.    CTH: negative     TTE: EF: 61%, 1. Mitral annular calcification, otherwise normal mitral  valve. Minimal mitral regurgitation.  2. Calcified trileaflet aortic valve with normal opening.  Mild aortic regurgitation.  3. Normal left ventricular internal dimensions and wall  thicknesses.  4. Endocardium not well visualized; grossly normal left  ventricular systolic function.  5. Mild diastolic dysfunction (Stage I).  6. Normal right ventricular size and function.

## 2023-06-14 NOTE — DISCHARGE NOTE NURSING/CASE MANAGEMENT/SOCIAL WORK - PATIENT PORTAL LINK FT
You can access the FollowMyHealth Patient Portal offered by [facility] by registering at the following website: [email or web address]. By joining [employer]’s FollowMyHealth portal, you will also be able to view your health information using other applications (apps) compatible with our system.

## 2023-06-14 NOTE — DISCHARGE NOTE PROVIDER - NSDCCPTREATMENT_GEN_ALL_CORE_FT
PRINCIPAL PROCEDURE  Procedure: MR head wo con  Findings and Treatment: Multiple somewhat patchy confluent nonspecific foci of T2/FLAIR hyperintensity are noted throughout the deep and periventricular white   matter of the cerebral hemispheres. There is no associated mass effect.   There is no evidence of acute ischemia on the diffusion-weighted images.  The bilateral medial temporal lobes appear symmetric in signal and   morphology. There is no evidence of mesial temporal sclerosis, gray   matter heterotopia, or cortical dysplasia.  There is diffuse cerebral volume loss with prominence of the sulci,   fissures, and cisternal spaces which is normal for the patient's age.   Ventricular size and configuration is unremarkable. Flow-voids are noted   throughout the major intracranial vessels, on the T2 weighted images,   consistent with their patency. The sellar region and posterior fossa   appear unremarkable.  The paranasal sinuses and tympanomastoid cavities are clear. Calvarial signal is within normal limits. The orbits appear unremarkable.  IMPRESSION: No acute intracranial hemorrhage or evidence of acute   ischemia        SECONDARY PROCEDURE  Procedure: CT head wo con  Findings and Treatment: No acute transcortical infarction, intracranial hemorrhage, hydrocephalus, or extra-axial fluid collections. No midline shift or mass   effect. Stable mild generalized parenchymal volume loss and chronic   microvascular disease. Intracranial vascular calcifications are present.  The visualized intraorbital contents are unremarkable. The imaged   portions of the paranasal sinuses are clear. The mastoid air cells are   clear. The visualized soft tissues and osseous structures appear normal.  IMPRESSION:  No CT evidence of acute intracranial pathology      Procedure: Transthoracic echocardiography (TTE)  Findings and Treatment: CONCLUSIONS:  1. Mitral annular calcification, otherwise normal mitral  valve. Minimal mitral regurgitation.  2. Calcified trileaflet aortic valve with normal opening.  Mild aortic regurgitation.  3. Normal left ventricular internal dimensions and wall  thicknesses.  4. Endocardium not well visualized; grossly normal left  ventricular systolic function.  5.Mild diastolic dysfunction (Stage I).  6. Normal right ventricular size and function

## 2023-06-14 NOTE — PROGRESS NOTE ADULT - SUBJECTIVE AND OBJECTIVE BOX
Leonardo Weber MD  Emergency Medicine PGY 1      MICU PROGRESS NOTE     OVERNIGHT EVENTS:    SUBJECTIVE: Patient seen and examined at bedside. Patient denies fever, chills, SOB, chest pain, N/V/D.    OBJECTIVE:    VITAL SIGNS:  ICU Vital Signs Last 24 Hrs  T(C): 36.6 (14 Jun 2023 04:00), Max: 36.7 (14 Jun 2023 00:00)  T(F): 97.9 (14 Jun 2023 04:00), Max: 98 (14 Jun 2023 00:00)  HR: 67 (14 Jun 2023 04:00) (58 - 71)  BP: 108/76 (14 Jun 2023 04:00) (102/78 - 145/91)  BP(mean): 82 (14 Jun 2023 04:00) (82 - 103)  ABP: --  ABP(mean): --  RR: 19 (14 Jun 2023 04:00) (12 - 21)  SpO2: 94% (14 Jun 2023 04:00) (94% - 100%)    O2 Parameters below as of 14 Jun 2023 04:00  Patient On (Oxygen Delivery Method): room air              06-13 @ 07:01  -  06-14 @ 07:00  --------------------------------------------------------  IN: 800 mL / OUT: 1775 mL / NET: -975 mL        =================PHYSICAL EXAM=================    CONSTITUTIONAL: Well-developed; well-nourished; in no acute distress.   SKIN: warm, dry  HEAD: Normocephalic; atraumatic.  EYES: no conjunctival injection. PERRL. EOMI without pain. no scleral icterus  ENT: No nasal discharge; airway clear.  NECK: Supple; non tender.  CARD: S1, S2 normal; no murmurs, gallops, or rubs. Regular rate and rhythm.   RESP: No wheezes, rales or rhonchi. Good air movement bilaterally.   ABD: soft ntnd, no guarding, no distention, no rigidity.   EXT: No cyanosis or edema. Ambulates independently  NEURO: alert, oriented to ppte, speech with stuttering, cn 2-12 intact, motor strength 5/5 throughout, sensation intact throughout, coordination intact  PSYCH: Cooperative, appropriate.     =================================================    LABS:                        13.7   8.61  )-----------( 117      ( 14 Jun 2023 05:15 )             41.2     Auto Eosinophil # x     / Auto Eosinophil % x     / Auto Neutrophil # x     / Auto Neutrophil % x     / BANDS % x                            13.4   7.76  )-----------( 110      ( 13 Jun 2023 05:00 )             42.0     Auto Eosinophil # x     / Auto Eosinophil % x     / Auto Neutrophil # x     / Auto Neutrophil % x     / BANDS % x        06-14    139  |  102  |  18  ----------------------------<  106<H>  4.4   |  24  |  0.97  06-13    139  |  105  |  14  ----------------------------<  109<H>  4.5   |  22  |  0.94    Ca    9.0      14 Jun 2023 05:15  Mg     2.00     06-14  Phos  3.9     06-14  TPro  6.7  /  Alb  4.0  /  TBili  0.6  /  DBili  x   /  AST  17  /  ALT  12  /  AlkPhos  34<L>  06-14  TPro  6.3  /  Alb  3.9  /  TBili  0.4  /  DBili  x   /  AST  18  /  ALT  15  /  AlkPhos  32<L>  06-13    PT/INR - ( 14 Jun 2023 05:15 )   PT: 12.1 sec;   INR: 1.04 ratio         PTT - ( 14 Jun 2023 05:15 )  PTT:24.5 sec                 MEDICATIONS:  MEDICATIONS  (STANDING):  aspirin enteric coated 81 milliGRAM(s) Oral daily  atorvastatin 40 milliGRAM(s) Oral at bedtime  chlorhexidine 2% Cloths 1 Application(s) Topical <User Schedule>  divalproex  milliGRAM(s) Oral <User Schedule>  enoxaparin Injectable 40 milliGRAM(s) SubCutaneous every 24 hours    MEDICATIONS  (PRN):      ALLERGIES:  Allergies    No Known Allergies    Intolerances        LABS:                        13.7   8.61  )-----------( 117      ( 14 Jun 2023 05:15 )             41.2     06-14    139  |  102  |  18  ----------------------------<  106<H>  4.4   |  24  |  0.97    Ca    9.0      14 Jun 2023 05:15  Phos  3.9     06-14  Mg     2.00     06-14    TPro  6.7  /  Alb  4.0  /  TBili  0.6  /  DBili  x   /  AST  17  /  ALT  12  /  AlkPhos  34<L>  06-14    PT/INR - ( 14 Jun 2023 05:15 )   PT: 12.1 sec;   INR: 1.04 ratio         PTT - ( 14 Jun 2023 05:15 )  PTT:24.5 sec      CAPILLARY BLOOD GLUCOSE          RADIOLOGY & ADDITIONAL TESTS: Reviewed. Leonardo Weber MD  Emergency Medicine PGY 1      MICU PROGRESS NOTE     OVERNIGHT EVENTS: NAEON    SUBJECTIVE: Patient seen and examined at bedside. Feels well this morning without complaints. States speech is at baseline. Patient denies fever, chills, SOB, chest pain, N/V/D.    OBJECTIVE:    VITAL SIGNS:  ICU Vital Signs Last 24 Hrs  T(C): 36.6 (14 Jun 2023 04:00), Max: 36.7 (14 Jun 2023 00:00)  T(F): 97.9 (14 Jun 2023 04:00), Max: 98 (14 Jun 2023 00:00)  HR: 67 (14 Jun 2023 04:00) (58 - 71)  BP: 108/76 (14 Jun 2023 04:00) (102/78 - 145/91)  BP(mean): 82 (14 Jun 2023 04:00) (82 - 103)  ABP: --  ABP(mean): --  RR: 19 (14 Jun 2023 04:00) (12 - 21)  SpO2: 94% (14 Jun 2023 04:00) (94% - 100%)    O2 Parameters below as of 14 Jun 2023 04:00  Patient On (Oxygen Delivery Method): room air              06-13 @ 07:01  -  06-14 @ 07:00  --------------------------------------------------------  IN: 800 mL / OUT: 1775 mL / NET: -975 mL        =================PHYSICAL EXAM=================    CONSTITUTIONAL: Well-developed; well-nourished; in no acute distress.   SKIN: warm, dry  HEAD: Normocephalic; atraumatic.  EYES: no conjunctival injection. PERRL. EOMI without pain. no scleral icterus  ENT: No nasal discharge; airway clear.  NECK: Supple; non tender.  CARD: S1, S2 normal; no murmurs, gallops, or rubs. Regular rate and rhythm.   RESP: No wheezes, rales or rhonchi. Good air movement bilaterally.   ABD: soft ntnd, no guarding, no distention, no rigidity.   EXT: No cyanosis or edema. Ambulates independently  NEURO: alert, oriented to ppte, speech with stuttering, cn 2-12 intact, motor strength 5/5 throughout, sensation intact throughout, coordination intact  PSYCH: Cooperative, appropriate.     =================================================    LABS:                        13.7   8.61  )-----------( 117      ( 14 Jun 2023 05:15 )             41.2     Auto Eosinophil # x     / Auto Eosinophil % x     / Auto Neutrophil # x     / Auto Neutrophil % x     / BANDS % x                            13.4   7.76  )-----------( 110      ( 13 Jun 2023 05:00 )             42.0     Auto Eosinophil # x     / Auto Eosinophil % x     / Auto Neutrophil # x     / Auto Neutrophil % x     / BANDS % x        06-14    139  |  102  |  18  ----------------------------<  106<H>  4.4   |  24  |  0.97  06-13    139  |  105  |  14  ----------------------------<  109<H>  4.5   |  22  |  0.94    Ca    9.0      14 Jun 2023 05:15  Mg     2.00     06-14  Phos  3.9     06-14  TPro  6.7  /  Alb  4.0  /  TBili  0.6  /  DBili  x   /  AST  17  /  ALT  12  /  AlkPhos  34<L>  06-14  TPro  6.3  /  Alb  3.9  /  TBili  0.4  /  DBili  x   /  AST  18  /  ALT  15  /  AlkPhos  32<L>  06-13    PT/INR - ( 14 Jun 2023 05:15 )   PT: 12.1 sec;   INR: 1.04 ratio         PTT - ( 14 Jun 2023 05:15 )  PTT:24.5 sec                 MEDICATIONS:  MEDICATIONS  (STANDING):  aspirin enteric coated 81 milliGRAM(s) Oral daily  atorvastatin 40 milliGRAM(s) Oral at bedtime  chlorhexidine 2% Cloths 1 Application(s) Topical <User Schedule>  divalproex  milliGRAM(s) Oral <User Schedule>  enoxaparin Injectable 40 milliGRAM(s) SubCutaneous every 24 hours    MEDICATIONS  (PRN):      ALLERGIES:  Allergies    No Known Allergies    Intolerances        LABS:                        13.7   8.61  )-----------( 117      ( 14 Jun 2023 05:15 )             41.2     06-14    139  |  102  |  18  ----------------------------<  106<H>  4.4   |  24  |  0.97    Ca    9.0      14 Jun 2023 05:15  Phos  3.9     06-14  Mg     2.00     06-14    TPro  6.7  /  Alb  4.0  /  TBili  0.6  /  DBili  x   /  AST  17  /  ALT  12  /  AlkPhos  34<L>  06-14    PT/INR - ( 14 Jun 2023 05:15 )   PT: 12.1 sec;   INR: 1.04 ratio         PTT - ( 14 Jun 2023 05:15 )  PTT:24.5 sec      CAPILLARY BLOOD GLUCOSE          RADIOLOGY & ADDITIONAL TESTS: Reviewed.

## 2023-06-14 NOTE — DIETITIAN INITIAL EVALUATION ADULT - ORAL INTAKE PTA/DIET HISTORY
Pt. with good appetite/PO intake PTA.  Adheres to dietary sodium restriction, does not add salt to food.

## 2023-06-21 ENCOUNTER — APPOINTMENT (OUTPATIENT)
Dept: NEUROLOGY | Facility: CLINIC | Age: 78
End: 2023-06-21
Payer: MEDICARE

## 2023-06-21 ENCOUNTER — RX RENEWAL (OUTPATIENT)
Age: 78
End: 2023-06-21

## 2023-06-21 VITALS
SYSTOLIC BLOOD PRESSURE: 123 MMHG | HEIGHT: 73 IN | DIASTOLIC BLOOD PRESSURE: 73 MMHG | WEIGHT: 180 LBS | BODY MASS INDEX: 23.86 KG/M2 | HEART RATE: 69 BPM

## 2023-06-21 PROCEDURE — 99214 OFFICE O/P EST MOD 30 MIN: CPT

## 2023-06-21 RX ORDER — DIVALPROEX SODIUM 500 MG/1
500 TABLET, DELAYED RELEASE ORAL TWICE DAILY
Qty: 180 | Refills: 3 | Status: DISCONTINUED | COMMUNITY
Start: 2020-11-02 | End: 2023-06-21

## 2023-06-21 NOTE — ASSESSMENT
[FreeTextEntry1] : Mr. RODRIGUEZ presented recently to [facility] EMU with new onset seizures, found to have seizures on EEG.  Did not tolerate levetiracetam, transitioned successfully to divalproex. Until now, last seizure was April 2022, now had seizure in June 2023.  \par \par Plan\par 1. change divalproex  q12, increase lacosamide 150 q12\par 2. RTC NOV 2023\par 3. Driving restriction [employer] law reviewed. Patient instructed that I will - per my policy - do DMV paperwork 6mo from last seizure. DMV must approve driving resumption.  \par 4. discussed risk of SUDEP, best way to reduce risk is med compliance.  \par \par I have spent 30 minutes or longer reviewing patient data or discussing with the patient  the cause of seizures or seizure-like events and comorbid conditions, assessing the risk of recurrence, educating the patient or family to recognize seizures, discussing possible treatment options for seizures and comorbid conditions and documenting encounter and plan. More than 50% of time spent counseling and educating patient about epilepsy including safety issues, AED side effects and interactions, alcohol consumption, sleep deprivation, risks and driving privileges associated with the [facility]. Greater than 50% of the encounter time was spent on counseling and coordination of care for reviewing records in [employer], discussion with patient regarding plan.
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
## 2023-10-28 ENCOUNTER — EMERGENCY (EMERGENCY)
Facility: HOSPITAL | Age: 78
LOS: 1 days | Discharge: HOME | End: 2023-10-28
Attending: EMERGENCY MEDICINE
Payer: MEDICARE

## 2023-10-28 VITALS
RESPIRATION RATE: 19 BRPM | TEMPERATURE: 97 F | SYSTOLIC BLOOD PRESSURE: 133 MMHG | DIASTOLIC BLOOD PRESSURE: 82 MMHG | OXYGEN SATURATION: 99 % | HEART RATE: 60 BPM

## 2023-10-28 VITALS
OXYGEN SATURATION: 99 % | WEIGHT: 175.05 LBS | HEART RATE: 60 BPM | SYSTOLIC BLOOD PRESSURE: 127 MMHG | TEMPERATURE: 98 F | RESPIRATION RATE: 20 BRPM | DIASTOLIC BLOOD PRESSURE: 80 MMHG | HEIGHT: 73 IN

## 2023-10-28 DIAGNOSIS — Z98.890 OTHER SPECIFIED POSTPROCEDURAL STATES: Chronic | ICD-10-CM

## 2023-10-28 PROCEDURE — 76377 3D RENDER W/INTRP POSTPROCES: CPT | Mod: 26

## 2023-10-28 PROCEDURE — 70486 CT MAXILLOFACIAL W/O DYE: CPT | Mod: MA

## 2023-10-28 PROCEDURE — 70450 CT HEAD/BRAIN W/O DYE: CPT | Mod: MA

## 2023-10-28 PROCEDURE — 70486 CT MAXILLOFACIAL W/O DYE: CPT | Mod: 26,MA

## 2023-10-28 PROCEDURE — 99284 EMERGENCY DEPT VISIT MOD MDM: CPT

## 2023-10-28 PROCEDURE — 76377 3D RENDER W/INTRP POSTPROCES: CPT

## 2023-10-28 PROCEDURE — 99284 EMERGENCY DEPT VISIT MOD MDM: CPT | Mod: 25

## 2023-10-28 PROCEDURE — 70450 CT HEAD/BRAIN W/O DYE: CPT | Mod: 26,MA

## 2023-10-28 NOTE — ED PROVIDER NOTE - DIFFERENTIAL DIAGNOSIS
Ddx includes, however, is not limited to: ICH, facial bone fx, contusion, other Differential Diagnosis

## 2023-10-28 NOTE — ED PROVIDER NOTE - CROS ED SKIN ALL NEG
4 Eyes Skin Assessment     NAME:  Marques Swanson OF BIRTH:  1971  MEDICAL RECORD NUMBER:  [id number]    The patient is being assessed for  Admission    I agree that at least one RN has performed a thorough Head to Toe Skin Assessment on the patient. ALL assessment sites listed below have been assessed. Areas assessed by both nurses:    Head, Face, Ears, Shoulders, Back, Chest, Arms, Elbows, Hands, Sacrum. Buttock, Coccyx, Ischium, Legs. Feet and Heels, and Under Medical Devices         Does the Patient have a Wound?  No noted wound(s)       Hubert Prevention initiated by RN: No  Wound Care Orders initiated by RN: No    Pressure Injury (Stage 3,4, Unstageable, DTI, NWPT, and Complex wounds) if present, place Wound referral order by RN under : No    New Ostomies, if present place, Ostomy referral order under : No     Nurse 1 eSignature: Electronically signed by Lucrecia Brunson RN on 9/13/23 at 11:48 AM EDT    **SHARE this note so that the co-signing nurse can place an eSignature**    Nurse 2 eSignature: Electronically signed by Timmothy Cabot, RN on 9/13/23 at 11:50 AM EDT - - -

## 2023-10-28 NOTE — ED ADULT NURSE NOTE - NSFALLRISKINTERV_ED_ALL_ED

## 2023-10-28 NOTE — ED PROVIDER NOTE - NSFOLLOWUPINSTRUCTIONS_ED_ALL_ED_FT
Please see the information of facial contusion, abrasion wound care, and head injury.    Keep the abrasion wounds clean and dry washing with soap and water.    Bacitracin ointment to wound twice a day.    Keep continue your current medications as prescribed.    Take Tylenol (2 tablets of 500mg every 8hours) as needed for pain.    Follow up with your DR. for reevaluation, call Monday for appointment.    Return for any concerns, fever, numbness, vomiting, weakness, or worsening pain.

## 2023-10-28 NOTE — ED ADULT TRIAGE NOTE - RESPIRATORY RATE (BREATHS/MIN)
Anesthesia Pre Eval Note    Anesthesia ROS/Med Hx    Overall Review:  EKG was reviewed     Anesthetic Complication History:  Patient does not have a history of anesthetic complications      Pulmonary Review:  Patient does not have a pulmonary history      Neuro/Psych Review:  Patient does not have a neuro/psych history       Cardiovascular Review:  Exercise tolerance: good (>4 METS)  Positive for CAD  Positive for hypertension  Positive for hyperlipidemia    GI/HEPATIC/RENAL Review:  Patient does not have a GI/hepatic/renalhistory       End/Other Review:  Positive for diabetes  Positive for anemia  Additional Results:     ALLERGIES:  No Known Allergies       Last Labs        Component                Value               Date/Time                  WBC                      7.3                 06/14/2023 0610            RBC                      3.27 (L)            06/14/2023 0610            HGB                      11.0 (L)            06/14/2023 0610            HCT                      33.7 (L)            06/14/2023 0610            MCV                      103.1 (H)           06/14/2023 0610            MCH                      33.6                06/14/2023 0610            MCHC                     32.6                06/14/2023 0610            RDW-CV                   13.5                06/14/2023 0610            Sodium                   140                 06/14/2023 0610            Potassium                4.5                 06/14/2023 0610            Chloride                 111 (H)             06/14/2023 0610            Carbon Dioxide           26                  06/14/2023 0610            Glucose                  126 (H)             06/14/2023 0610            BUN                      20                  06/14/2023 0610            Creatinine               1.19 (H)            06/14/2023 0610            Glomerular Filtrati*     60                  06/14/2023 0610            Calcium                  8.9                  06/14/2023 0610            PLT                      64 (L)              06/14/2023 0610            PTT                      28                  06/14/2023 0652            INR                      1.1                 06/14/2023 0652        Past Medical History:  No date: Essential (primary) hypertension    Past Surgical History:  No date: Cardiac catherization       Prior to Admission medications :  Medication cyanocobalamin (Vitamin B-12) 500 MCG tablet, Sig Take by mouth daily., Start Date , End Date , Taking? Yes, Authorizing Provider Provider, Outside    Medication B Complex Vitamins (B COMPLEX PO), Sig Take by mouth daily. Strength unknown, Start Date , End Date , Taking? Yes, Authorizing Provider Provider, Outside    Medication Vitamin D-Vitamin K (VITAMIN K2-VITAMIN D3 PO), Sig Take by mouth daily. Strength unknown, Start Date , End Date , Taking? Yes, Authorizing Provider Provider, Outside    Medication coenzyme Q10 100 MG capsule, Sig Take by mouth daily., Start Date 1/1/1900, End Date , Taking? Yes, Authorizing Provider Provider, Outside    Medication metoPROLOL succinate (TOPROL-XL) 25 MG 24 hr tablet, Sig Take 25 mg by mouth daily., Start Date , End Date , Taking? Yes, Authorizing Provider Provider, Outside    Medication atorvastatin (LIPITOR) 20 MG tablet, Sig Take 20 mg by mouth at bedtime., Start Date 4/16/21, End Date , Taking? Yes, Authorizing Provider Provider, Outside    Medication bimatoprost (LUMIGAN) 0.01 % ophthalmic solution, Sig Place 1 drop into both eyes every evening., Start Date 1/1/1900, End Date , Taking? Yes, Authorizing Provider Provider, Outside    Medication brimonidine-timolol 0.2-0.5 % ophthalmic solution, Sig Place 1 drop into both eyes nightly., Start Date 1/1/1900, End Date , Taking? Yes, Authorizing Provider Provider, Outside    Medication losartan (COZAAR) 50 MG tablet, Sig Take 50 mg by mouth daily., Start Date 6/8/21, End Date , Taking? Yes, Authorizing Provider  Provider, Outside    Medication traMADol (ULTRAM) 50 MG tablet, Sig Take 1 tablet by mouth every 6 hours as needed for Pain., Start Date 6/18/21, End Date , Taking? Yes, Authorizing Provider Joe Patton MD    Medication tetrahydrozoline-zinc (VISINE-AC) 0.05-0.25 % ophthalmic solution, Sig , Start Date , End Date 6/14/23, Taking? , Authorizing Provider Provider, Outside    Medication thiamine (VITAMIN B1) 100 MG tablet, Sig , Start Date , End Date 6/14/23, Taking? , Authorizing Provider Provider, Outside    Medication vitamin D3 (CHOLECALCIFEROL) 1.25 mg (50,000 units) capsule, Sig , Start Date 1/1/1900, End Date 6/14/23, Taking? , Authorizing Provider Provider, Outside    Medication ALPRAZolam (XANAX) 0.5 MG tablet, Sig Take 1 tablet by mouth nightly as needed for Sleep., Start Date 6/18/21, End Date 6/14/23, Taking? , Authorizing Provider Joe Patton MD         Patient Vitals in the past 24 hrs:  06/14/23 1800, BP:128/69, Temp:36.7 °C (98.1 °F), Temp src:Oral, Pulse:68, Resp:18, SpO2:94 %  06/14/23 1740, BP:129/65, Temp:36.6 °C (97.9 °F), Temp src:Oral, Pulse:69, Resp:18, SpO2:96 %  06/14/23 1713, BP:134/66, Temp:36.5 °C (97.7 °F), Temp src:Oral, Pulse:80, Resp:18, SpO2:94 %, Height:5' 11\" (1.803 m), Weight:82 kg (180 lb 12.4 oz)  06/14/23 1640, BP:(!) 143/46, Temp:36.2 °C (97.2 °F), Temp src:Temporal, Pulse:76, Resp:20, SpO2:98 %  06/14/23 1636, Resp:14  06/14/23 1630, BP:(!) 142/66, Pulse:79, Resp:(!) 22, SpO2:100 %  06/14/23 1623, Resp:14  06/14/23 1620, BP:126/56, Pulse:88, Resp:18, SpO2:91 %  06/14/23 1610, BP:130/48, Pulse:95, Resp:(!) 21, SpO2:98 %  06/14/23 1600, BP:123/41, Temp:36.2 °C (97.2 °F), Temp src:Temporal, Pulse:99, Resp:18, SpO2:97 %  06/14/23 1050, BP:(!) 152/67, Pulse:76, Resp:16, SpO2:93 %  06/14/23 1025, BP:(!) 148/53, Pulse:81, Resp:20, SpO2:95 %, Height:5' 11\" (1.803 m), Weight:82 kg (180 lb 12.4 oz)  06/14/23 0920, BP:(!) 147/61, Pulse:65, Resp:17, SpO2:97 %  06/14/23 0815,  BP:128/75, Pulse:77, Resp:16, SpO2:97 %  06/14/23 0715, BP:137/70, Pulse:71, Resp:20, SpO2:99 %  06/14/23 0700, BP:137/56, Pulse:73, Resp:18, SpO2:97 %  06/14/23 0653, Resp:16  06/14/23 0650, BP:137/56, Pulse:71, Resp:16, SpO2:98 %  06/14/23 0607, Resp:16 06/14/23 0606, BP:(!) 142/58, Pulse:76, Resp:16, SpO2:99 %  06/14/23 0533, BP:(!) 143/59, Temp:36.4 °C (97.5 °F), Temp src:Tympanic, Pulse:69, Resp:16, SpO2:97 %      Relevant Problems   No relevant active problems       Physical Exam     Airway   Mallampati: II  TM Distance: >3 FB  Neck ROM: Full  Neck: Non-tender and Able to place in sniff position  TMJ Mobility: Good    Cardiovascular  Cardiovascular exam normal  Cardio Rhythm: Regular  Cardio Rate: Normal    Head Assessment  Head assessment: Normocephalic and Atraumatic    General Assessment  General Assessment: Alert and oriented and No acute distress    Dental Exam  Dental exam normal    Pulmonary Exam  Pulmonary exam normal  Breath sounds clear to auscultation:  Yes    Abdominal Exam  Abdominal exam normal      Anesthesia Plan:    ASA Status: 3  Anesthesia Type: General    Induction: Intravenous  Preferred Airway Type: ETT  Maintenance: Inhalational    Post-op Pain Management: Per Surgeon      Checklist  Reviewed: NPO Status, Allergies, Medications, Problem list and Past Med History  Consent/Risks Discussed Statement:  The proposed anesthetic plan, including its risks and benefits, have been discussed with the Patient along with the risks and benefits of alternatives. Questions were encouraged and answered and the patient and/or representative understands and agrees to proceed.        I discussed with the patient (and/or patient's legal representative) the risks and benefits of the proposed anesthesia plan, General, which may include services performed by other anesthesia providers.    Alternative anesthesia plans, if available, were reviewed with the patient (and/or patient's legal representative).  Discussion has been held with the patient (and/or patient's legal representative) regarding risks of anesthesia, which include Nausea, Vomiting, Dental Injury, allergic reaction, bleeding/hematoma, sore throat, vomiting, nausea, need for blood transfusion, organ damage and oral injury and emergent situations that may require change in anesthesia plan.    The patient (and/or patient's legal representative) has indicated understanding, his/her questions have been answered, and he/she wishes to proceed with the planned anesthetic.    Blood Products: Not Anticipated     20

## 2023-10-28 NOTE — ED PROVIDER NOTE - NS ED ATTENDING STATEMENT MOD
This was a shared visit with the JEAN MARIE. I reviewed and verified the documentation and independently performed the documented:

## 2023-10-28 NOTE — ED PROVIDER NOTE - CLINICAL SUMMARY MEDICAL DECISION MAKING FREE TEXT BOX
LUNA Zepeda MD: 78M with PMH HTN, HLD, seizure d/o, on ASA daily is sent in from  for CTH 3 days s/p head injury. Pt reports he fell in the grocery store 3 days ago when holding heavy groceries. No LOC. Was helped up by a friend. He then went to  who updated his tetanus for skin abrasions, and sent pt in for CTH. Pt reports he feels fine, is ambulatory, without focal neurologic deficits. Plan: CTH, outpt f/u with return precautions

## 2023-10-28 NOTE — ED PROVIDER NOTE - PATIENT PORTAL LINK FT
You can access the FollowMyHealth Patient Portal offered by [facility] by registering at the following website: [email or web address]. By joining [employer]’s FollowMyHealth portal, you will also be able to view your health information using other applications (apps) compatible with our system.

## 2023-10-28 NOTE — ED PROVIDER NOTE - CARE PLAN
Principal Discharge DX:	Facial contusion  Secondary Diagnosis:	Facial abrasion  Secondary Diagnosis:	Closed head injury   1

## 2023-10-28 NOTE — ED PROVIDER NOTE - OBJECTIVE STATEMENT
77yo male pt with PMHx of HTN, HLD, Seizure Disorder on Depakote, ASA 81mg QD presents to ED with facial injury s/p mechanical fall Wednesday and sent by  for head CT. Reports he 77yo male pt with PMHx of HTN, HLD, Seizure Disorder on Depakote (last seizure was years ago), ASA 81mg QD presents to ED with facial injury s/p mechanical fall Wednesday and sent by  for head CT. Reports he was holding heavy bags after food shopping and fell on face and both hands Wednesday. Denies LOC. Denies dizziness or palpitations prior to the fall. Pt's seen by  today, utded TD, and sent to ED for CT. Denies headache, dizziness, visual changes or eye pain. Denies fever, chills or recent sickness. Denies sensory changes or weakness to extremities. Denies CP/SOB/ABD pain or N/V/D.  Reports he had hand x-ray for finger injuries in UC, finger splint and maurilio tapes applied, right hand, and declined further hand evaluation in ED.

## 2023-10-28 NOTE — ED ADULT NURSE NOTE - OBJECTIVE STATEMENT
The patient is a 78y male coming from home for complaints of a fall. Patient has a PMH of Focal epilepsy, Hyperlipidemia, and Hypertension. Patient reports going shopping today when he had a fall landing on his right side. On the way down on his fall he reports he slid against the shopping manuel causing the abrasions to his facial area. Upon assessment he is axo x4 and responding appropriately. He is breathing spontaneously on room air with no signs of respiratory distress. He has full ROM and full sensation to his bilateral upper and lower extremities. PT is ambulatory up @ [facility]. He denies head trauma and LOC from fall. Pt denies chest pain, palpitations, shortness of breath, headache, visual disturbances, numbness/tingling, fever, chills, diaphoresis,  nausea, vomiting, constipation, diarrhea, or urinary symptoms. Note Splint to the fingers on the right hand. Safety and comfort measures provided, bed locked and in lowest position, side rails up for safety. Awaiting transport to CT Scan

## 2023-10-28 NOTE — ED PROVIDER NOTE - PHYSICAL EXAMINATION
NAD. VSS. Afebrile. A&Ox4. PERRL, EOMI. Superficial multiple abrasions on right forehead without infection signs. +Right periorbital ecchymosis and swelling with mild nivia tender. Neck supple. Lungs clear. No spinal tender. No chest wall, rib or cva tender. ABD soft, non tender. No pelvic or hip tender. +Right middle and ring finger with splint and maurilio tapes with N/V- intact (declined further exam). Neuro- intact.

## 2023-11-17 ENCOUNTER — APPOINTMENT (OUTPATIENT)
Dept: NEUROLOGY | Facility: CLINIC | Age: 78
End: 2023-11-17
Payer: MEDICARE

## 2023-11-17 VITALS
BODY MASS INDEX: 23.86 KG/M2 | SYSTOLIC BLOOD PRESSURE: 134 MMHG | HEART RATE: 65 BPM | DIASTOLIC BLOOD PRESSURE: 75 MMHG | WEIGHT: 180 LBS | HEIGHT: 73 IN

## 2023-11-17 DIAGNOSIS — G40.109 LOCALIZATION-RELATED (FOCAL) (PARTIAL) SYMPTOMATIC EPILEPSY AND EPILEPTIC SYNDROMES WITH SIMPLE PARTIAL SEIZURES, NOT INTRACTABLE, W/OUT STATUS EPILEPTICUS: ICD-10-CM

## 2023-11-17 PROCEDURE — 99214 OFFICE O/P EST MOD 30 MIN: CPT

## 2024-01-19 ENCOUNTER — APPOINTMENT (OUTPATIENT)
Dept: NEUROLOGY | Facility: CLINIC | Age: 79
End: 2024-01-19
Payer: MEDICARE

## 2024-01-19 VITALS
WEIGHT: 175 LBS | HEART RATE: 66 BPM | DIASTOLIC BLOOD PRESSURE: 70 MMHG | HEIGHT: 73 IN | SYSTOLIC BLOOD PRESSURE: 116 MMHG | BODY MASS INDEX: 23.19 KG/M2

## 2024-01-19 PROCEDURE — 99214 OFFICE O/P EST MOD 30 MIN: CPT

## 2024-01-19 NOTE — ASSESSMENT
[FreeTextEntry1] : Mr. RODRIGUEZ presented recently to [facility] EMU with new onset seizures, found to have seizures on EEG. Did not tolerate levetiracetam, transitioned successfully to divalproex. Until now, last seizure was April 2022, now had seizure in June (?May) 2023.  Plan 1. continue divalproex  q12, increase lacosamide 150 q12 2. reviewed seizure triggers 3. annual labwork 4.DMV  forms completed and faxed to [phone] and dae original to DMV 5. Follow up with Dr Coleman in 4-6 months call if any breakthrough seizures
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
## 2024-03-13 NOTE — ED PROVIDER NOTE - DATE/TIME 4
[facility]                                     Progress Note    Patient Name: Tavo Key  Date: 3/13/2024         Service Type: Individual      Session Start Time: 1:00PM  Session End Time: 1:58PM     Session Length: 58 minutes    Session #: 9    Attendees: Client attended alone    Service Modality:  Video Visit:      Provider verified identity through the following two step process.  Patient provided:  Patient is known previously to provider    Telemedicine Visit: The patient's condition can be safely assessed and treated via synchronous audio and visual telemedicine encounter.      Reason for Telemedicine Visit: Patient has requested telehealth visit    Originating Site (Patient Location): Patient's home    Distant Site (Provider Location): Provider Remote Setting- Home Office    Consent:  The patient/guardian has verbally consented to: the potential risks and benefits of telemedicine (video visit) versus in person care; bill my insurance or make self-payment for services provided; and responsibility for payment of non-covered services.     Patient would like the video invitation sent by:  My Chart    Mode of Communication:  Video Conference via [facility]    Distant Location (Provider):  Off-site    As the provider I attest to compliance with applicable laws and regulations related to telemedicine.    DATA  Extended Session (53+ minutes): PROLONGED SERVICE IN THE OUTPATIENT SETTING REQUIRING DIRECT (FACE-TO-FACE) PATIENT CONTACT BEYOND THE USUAL SERVICE:    - Longer session due to limited access to mental health appointments and necessity to address patient's distress / complexity  Interactive Complexity: No  Crisis: No        Progress Since Last Session (Related to Symptoms / Goals / Homework):   Symptoms: Improving less irritability    Homework: Achieved / completed to satisfaction      Episode of Care Goals: Satisfactory progress - ACTION (Actively working towards change); Intervened by  reinforcing change plan / affirming steps taken     Current / Ongoing Stressors and Concerns:   Client has had a migraine since 1AM and had to call in sick. He has had increased anxiety over the past few days but is unsure what caused this migraine. They typically occur every 2 months but he seems to think they have been more frequent as of late. Talked about using botox as possible tx as he was considering going to the ER for a migraine cocktail due to this one's severity. Client has also been having trouble sleeping related to long-standing difficulties caused by his CPAP, sinus pain and pressure and overall wakefulness. Talked about using Advil or Tylenol PM and if this is not effective, talking to his MD about trazadone. Client feels some of his mental health concerns stem from poor sleep. Client's stepson is coming this weekend and he has plans to see a concert one night. Is concerned about how he will manage the extra social time when he isn't feeling 100%. Client's leg has been having some numbness he also needs to talk to his MD about and said it is time for his annual physical so he could address a few things at that visit.     Treatment Objective(s) Addressed in This Session:   identify coping strategies for more effectively managing Bipolar Disorder  Work on managing his diabetes     Intervention:   CBT: Cognitive restructuring  Solution Focused: prep for move    Assessments completed prior to visit:  The following assessments were completed by patient for this visit:  PROMIS 10-Global Health (all questions and answers displayed):       10/31/2023    10:24 PM 11/14/2023     5:56 AM 2/29/2024     5:00 PM   PROMIS 10   In general, would you say your health is: Good Fair    In general, would you say your quality of life is: Good Good    In general, how would you rate your physical health? Good Fair    In general, how would you rate your mental health, including your mood and your ability to think? Fair Good     In general, how would you rate your satisfaction with your social activities and relationships? Poor Fair    In general, please rate how well you carry out your usual social activities and roles Fair Good    To what extent are you able to carry out your everyday physical activities such as walking, climbing stairs, carrying groceries, or moving a chair? Completely Completely    In the past 7 days, how often have you been bothered by emotional problems such as feeling anxious, depressed, or irritable? Always Often    In the past 7 days, how would you rate your fatigue on average? Mild Severe    In the past 7 days, how would you rate your pain on average, where 0 means no pain, and 10 means worst imaginable pain? 0 2    In general, would you say your health is: 3 2 3   In general, would you say your quality of life is: 3 3 3   In general, how would you rate your physical health? 3 2 3   In general, how would you rate your mental health, including your mood and your ability to think? 2 3 2   In general, how would you rate your satisfaction with your social activities and relationships? 1 2 3   In general, please rate how well you carry out your usual social activities and roles. (This includes activities at home, at work and in your community, and responsibilities as a parent, child, spouse, employee, friend, etc.) 2 3 4   To what extent are you able to carry out your everyday physical activities such as walking, climbing stairs, carrying groceries, or moving a chair? 5 5 4   In the past 7 days, how often have you been bothered by emotional problems such as feeling anxious, depressed, or irritable? 5 4 4   In the past 7 days, how would you rate your fatigue on average? 2 4 2   In the past 7 days, how would you rate your pain on average, where 0 means no pain, and 10 means worst imaginable pain? 0 2 0   Global Mental Health Score 7 10 10   Global Physical Health Score 17 13 16   PROMIS TOTAL - SUBSCORES 24 23 26      PROMIS 10-Global Health (only subscores and total score):       10/31/2023    10:24 PM 11/14/2023     5:56 AM 2/29/2024     5:00 PM   PROMIS-10 Scores Only   Global Mental Health Score 7 10 10   Global Physical Health Score 17 13 16   PROMIS TOTAL - SUBSCORES 24 23 26         ASSESSMENT: Current Emotional / Mental Status (status of significant symptoms):   Risk status (Self / Other harm or suicidal ideation)   Patient denies current fears or concerns for personal safety.   Patient denies current or recent suicidal ideation or behaviors.   Patient denies current or recent homicidal ideation or behaviors.   Patient denies current or recent self injurious behavior or ideation.   Patient denies other safety concerns.   Patient reports there has been no change in risk factors since their last session.     Patient reports there has been no change in protective factors since their last session.     Recommended that patient call 911 or go to the local ED should there be a change in any of these risk factors.     Appearance:   Appropriate    Eye Contact:   Good    Psychomotor Behavior: Normal    Attitude:   Cooperative  Friendly   Orientation:   All   Speech    Rate / Production: Normal     Volume:  Normal    Mood:    Anxious  Normal   Affect:    Appropriate    Thought Content:  Clear    Thought Form:  Coherent  Logical    Insight:    Good      Medication Review:   No changes to current psychiatric medication(s)     Medication Compliance:   Yes     Changes in Health Issues:   None reported     Chemical Use Review:   Substance Use: Chemical use reviewed, no active concerns identified      Tobacco Use: No current tobacco use.      Diagnosis:  1. Bipolar 1 disorder (H)        Collateral Reports Completed:   Not Applicable    PLAN: (Patient Tasks / Therapist Tasks / Other)  Homework: Client to continue working on self esteem with self advocacy. Also focus efforts on sleep hygiene and schedule with his Primary to address  other medical issues.         Shanelle Caruso, LMFT                                                         _________________________________________________________________________________________________________________________                                            Individual Treatment Plan    Patient's Name: Tavo Key  YOB: 1978    Date of Creation: 1/2/2024  Date Treatment Plan Last Reviewed/Revised: 1/2/2024    DSM5 Diagnoses: 296.52 Bipolar I Disorder Current or Most Recent Episode Depressed, Moderate  Psychosocial / Contextual Factors: Trauma hx, health, work  PROMIS (reviewed every 90 days): 11/14/2023 Score: 26    Referral / Collaboration:  Referral to another professional/service is not indicated at this time. EMDR has been presented and client will research further.    Anticipated number of session for this episode of care: 9-12 sessions  Anticipation frequency of session: Biweekly  Anticipated Duration of each session: 53 or more minutes  Treatment plan will be reviewed in 90 days or when goals have been changed.       MeasurableTreatment Goal(s) related to diagnosis / functional impairment(s)  Goal 1: Patient will lower PHQ9 score to 3 or below.    I will know I've met my goal when I'm less irritable.      Objective #A (Patient Action)    Patient will identify stress management strategies for more effectively managing Bipolar Disorder.  Status: New - Date: 1/2/2024      Intervention(s)  Therapist will teach emotional recognition/identification.      Objective #B  Patient will Identify negative self-talk and behaviors: challenge core beliefs, myths, and actions.  Status: New - Date: 1/2/2024      Intervention(s)  Therapist will  help client work on cognitive restructuring .    Objective #C  Patient will  increase exercise .  Status: New - Date: 1/2/2024      Intervention(s)  Therapist will assign homework to increase level of activity especially aerobic .      Patient has  reviewed and agreed to the above plan.      Shanelle Caruso, LMFT  January 2, 2024                23-Oct-2020 03:21

## 2024-03-25 RX ORDER — DIVALPROEX SODIUM 500 1/1
500 TABLET, EXTENDED RELEASE ORAL
Qty: 180 | Refills: 1 | Status: ACTIVE | COMMUNITY
Start: 2023-06-21 | End: 1900-01-01

## 2024-05-13 RX ORDER — LACOSAMIDE 150 MG/1
150 TABLET ORAL
Qty: 60 | Refills: 5 | Status: ACTIVE | COMMUNITY
Start: 2023-06-21 | End: 1900-01-01

## 2024-05-24 ENCOUNTER — APPOINTMENT (OUTPATIENT)
Dept: NEUROLOGY | Facility: CLINIC | Age: 79
End: 2024-05-24
Payer: MEDICARE

## 2024-05-24 PROCEDURE — 99214 OFFICE O/P EST MOD 30 MIN: CPT

## 2024-05-24 PROCEDURE — G2211 COMPLEX E/M VISIT ADD ON: CPT

## 2024-05-27 NOTE — ASSESSMENT
[FreeTextEntry1] : Mr. RODRIGUEZ presented recently to [facility] EMU with new onset seizures, found to have seizures on EEG. Did not tolerate levetiracetam, transitioned successfully to divalproex. Until now, last seizure was April 2022, now had seizure in June (?May) 2023.  Plan 1. continue divalproex  q12, i lacosamide 150 q12 2. reviewed seizure triggers 3. annual labwork 4. Follow up with Dr Coleman in 4-6 months call if any breakthrough seizures
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
## 2024-06-28 ENCOUNTER — NON-APPOINTMENT (OUTPATIENT)
Age: 79
End: 2024-06-28

## 2024-06-29 ENCOUNTER — EMERGENCY (EMERGENCY)
Facility: HOSPITAL | Age: 79
LOS: 1 days | Discharge: HOME | End: 2024-06-29
Attending: EMERGENCY MEDICINE
Payer: MEDICARE

## 2024-06-29 VITALS
RESPIRATION RATE: 18 BRPM | OXYGEN SATURATION: 97 % | WEIGHT: 160.06 LBS | DIASTOLIC BLOOD PRESSURE: 80 MMHG | TEMPERATURE: 98 F | HEART RATE: 66 BPM | SYSTOLIC BLOOD PRESSURE: 132 MMHG | HEIGHT: 73 IN

## 2024-06-29 DIAGNOSIS — Z98.890 OTHER SPECIFIED POSTPROCEDURAL STATES: Chronic | ICD-10-CM

## 2024-06-29 PROCEDURE — 99284 EMERGENCY DEPT VISIT MOD MDM: CPT | Mod: FS,25

## 2024-06-29 PROCEDURE — 12001 RPR S/N/AX/GEN/TRNK 2.5CM/<: CPT

## 2024-06-29 RX ORDER — AMOXICILLIN AND CLAVULANATE POTASSIUM 500; 125 MG/1; MG/1
1 TABLET, FILM COATED ORAL ONCE
Refills: 0 | Status: COMPLETED | OUTPATIENT
Start: 2024-06-29 | End: 2024-06-29

## 2024-06-30 PROCEDURE — 73130 X-RAY EXAM OF HAND: CPT | Mod: 26,LT

## 2024-06-30 PROCEDURE — 90715 TDAP VACCINE 7 YRS/> IM: CPT

## 2024-06-30 PROCEDURE — 99283 EMERGENCY DEPT VISIT LOW MDM: CPT

## 2024-06-30 PROCEDURE — 73130 X-RAY EXAM OF HAND: CPT

## 2024-06-30 PROCEDURE — 12001 RPR S/N/AX/GEN/TRNK 2.5CM/<: CPT

## 2024-06-30 RX ORDER — AMOXICILLIN AND CLAVULANATE POTASSIUM 500; 125 MG/1; MG/1
1 TABLET, FILM COATED ORAL
Qty: 14 | Refills: 0
Start: 2024-06-30 | End: 2024-07-06

## 2024-06-30 RX ADMIN — AMOXICILLIN AND CLAVULANATE POTASSIUM 1 TABLET(S): 500; 125 TABLET, FILM COATED ORAL at 00:09

## 2024-06-30 RX ADMIN — Medication 4 MILLIGRAM(S): at 00:00

## 2024-08-22 NOTE — ED ADULT NURSE NOTE - CCCP TRG CHIEF CMPLNT
1. Peripheral artery disease (HCC)  Assessment & Plan:  Left lower extremity critical limb threatening ischemia with tissue loss and diabetic foot ulcer.  Previously attempted endovascular intervention with long segment popliteal artery occlusion.  Patient with extensive varicose veins of the left lower extremity negating usable conduit.  Plan for above-knee to below-knee popliteal artery bypass with PTFE and vein cuff.  Possible CryoVein.      altered mental status

## 2024-09-04 ENCOUNTER — EMERGENCY (EMERGENCY)
Facility: HOSPITAL | Age: 79
LOS: 1 days | End: 2024-09-04
Attending: STUDENT IN AN ORGANIZED HEALTH CARE EDUCATION/TRAINING PROGRAM
Payer: MEDICARE

## 2024-09-04 VITALS
SYSTOLIC BLOOD PRESSURE: 126 MMHG | DIASTOLIC BLOOD PRESSURE: 80 MMHG | HEIGHT: 72 IN | HEART RATE: 92 BPM | RESPIRATION RATE: 18 BRPM | OXYGEN SATURATION: 98 % | TEMPERATURE: 98 F | WEIGHT: 166.23 LBS

## 2024-09-04 VITALS
DIASTOLIC BLOOD PRESSURE: 61 MMHG | TEMPERATURE: 98 F | OXYGEN SATURATION: 98 % | SYSTOLIC BLOOD PRESSURE: 103 MMHG | RESPIRATION RATE: 18 BRPM | HEART RATE: 61 BPM

## 2024-09-04 DIAGNOSIS — Z98.890 OTHER SPECIFIED POSTPROCEDURAL STATES: Chronic | ICD-10-CM

## 2024-09-04 LAB
ANION GAP SERPL CALC-SCNC: 8 MMOL/L — SIGNIFICANT CHANGE UP (ref 5–17)
APPEARANCE UR: CLEAR — SIGNIFICANT CHANGE UP
BACTERIA # UR AUTO: NEGATIVE /HPF — SIGNIFICANT CHANGE UP
BASOPHILS # BLD AUTO: 0.02 K/UL — SIGNIFICANT CHANGE UP (ref 0–0.2)
BASOPHILS NFR BLD AUTO: 0.4 % — SIGNIFICANT CHANGE UP (ref 0–2)
BILIRUB UR-MCNC: NEGATIVE — SIGNIFICANT CHANGE UP
BUN SERPL-MCNC: 12.5 MG/DL — SIGNIFICANT CHANGE UP (ref 8–20)
CALCIUM SERPL-MCNC: 9 MG/DL — SIGNIFICANT CHANGE UP (ref 8.4–10.5)
CAST: 0 /LPF — SIGNIFICANT CHANGE UP (ref 0–4)
CHLORIDE SERPL-SCNC: 100 MMOL/L — SIGNIFICANT CHANGE UP (ref 96–108)
CO2 SERPL-SCNC: 25 MMOL/L — SIGNIFICANT CHANGE UP (ref 22–29)
COLOR SPEC: YELLOW — SIGNIFICANT CHANGE UP
CREAT SERPL-MCNC: 0.75 MG/DL — SIGNIFICANT CHANGE UP (ref 0.5–1.3)
DIFF PNL FLD: NEGATIVE — SIGNIFICANT CHANGE UP
EGFR: 92 ML/MIN/1.73M2 — SIGNIFICANT CHANGE UP
EOSINOPHIL # BLD AUTO: 0.09 K/UL — SIGNIFICANT CHANGE UP (ref 0–0.5)
EOSINOPHIL NFR BLD AUTO: 1.9 % — SIGNIFICANT CHANGE UP (ref 0–6)
GLUCOSE SERPL-MCNC: 97 MG/DL — SIGNIFICANT CHANGE UP (ref 70–99)
GLUCOSE UR QL: NEGATIVE MG/DL — SIGNIFICANT CHANGE UP
HCT VFR BLD CALC: 36.7 % — LOW (ref 39–50)
HGB BLD-MCNC: 12.1 G/DL — LOW (ref 13–17)
IMM GRANULOCYTES NFR BLD AUTO: 0.4 % — SIGNIFICANT CHANGE UP (ref 0–0.9)
KETONES UR-MCNC: ABNORMAL MG/DL
LEUKOCYTE ESTERASE UR-ACNC: NEGATIVE — SIGNIFICANT CHANGE UP
LYMPHOCYTES # BLD AUTO: 1.94 K/UL — SIGNIFICANT CHANGE UP (ref 1–3.3)
LYMPHOCYTES # BLD AUTO: 41.5 % — SIGNIFICANT CHANGE UP (ref 13–44)
MCHC RBC-ENTMCNC: 31.3 PG — SIGNIFICANT CHANGE UP (ref 27–34)
MCHC RBC-ENTMCNC: 33 GM/DL — SIGNIFICANT CHANGE UP (ref 32–36)
MCV RBC AUTO: 94.8 FL — SIGNIFICANT CHANGE UP (ref 80–100)
MONOCYTES # BLD AUTO: 0.43 K/UL — SIGNIFICANT CHANGE UP (ref 0–0.9)
MONOCYTES NFR BLD AUTO: 9.2 % — SIGNIFICANT CHANGE UP (ref 2–14)
NEUTROPHILS # BLD AUTO: 2.18 K/UL — SIGNIFICANT CHANGE UP (ref 1.8–7.4)
NEUTROPHILS NFR BLD AUTO: 46.6 % — SIGNIFICANT CHANGE UP (ref 43–77)
NITRITE UR-MCNC: NEGATIVE — SIGNIFICANT CHANGE UP
PH UR: 6.5 — SIGNIFICANT CHANGE UP (ref 5–8)
PLATELET # BLD AUTO: 79 K/UL — LOW (ref 150–400)
POTASSIUM SERPL-MCNC: 4.5 MMOL/L — SIGNIFICANT CHANGE UP (ref 3.5–5.3)
POTASSIUM SERPL-SCNC: 4.5 MMOL/L — SIGNIFICANT CHANGE UP (ref 3.5–5.3)
PROT UR-MCNC: SIGNIFICANT CHANGE UP MG/DL
RBC # BLD: 3.87 M/UL — LOW (ref 4.2–5.8)
RBC # FLD: 14.4 % — SIGNIFICANT CHANGE UP (ref 10.3–14.5)
RBC CASTS # UR COMP ASSIST: 1 /HPF — SIGNIFICANT CHANGE UP (ref 0–4)
SODIUM SERPL-SCNC: 133 MMOL/L — LOW (ref 135–145)
SP GR SPEC: 1.02 — SIGNIFICANT CHANGE UP (ref 1–1.03)
SQUAMOUS # UR AUTO: 0 /HPF — SIGNIFICANT CHANGE UP (ref 0–5)
UROBILINOGEN FLD QL: 1 MG/DL — SIGNIFICANT CHANGE UP (ref 0.2–1)
WBC # BLD: 4.68 K/UL — SIGNIFICANT CHANGE UP (ref 3.8–10.5)
WBC # FLD AUTO: 4.68 K/UL — SIGNIFICANT CHANGE UP (ref 3.8–10.5)
WBC UR QL: 0 /HPF — SIGNIFICANT CHANGE UP (ref 0–5)

## 2024-09-04 PROCEDURE — 73130 X-RAY EXAM OF HAND: CPT | Mod: 26,50

## 2024-09-04 PROCEDURE — 71045 X-RAY EXAM CHEST 1 VIEW: CPT | Mod: 26

## 2024-09-04 PROCEDURE — 99284 EMERGENCY DEPT VISIT MOD MDM: CPT | Mod: GC

## 2024-09-04 PROCEDURE — 70486 CT MAXILLOFACIAL W/O DYE: CPT | Mod: 26,MC

## 2024-09-04 PROCEDURE — 70450 CT HEAD/BRAIN W/O DYE: CPT | Mod: MC

## 2024-09-04 PROCEDURE — 72125 CT NECK SPINE W/O DYE: CPT | Mod: 26,MC

## 2024-09-04 PROCEDURE — 71045 X-RAY EXAM CHEST 1 VIEW: CPT

## 2024-09-04 PROCEDURE — 99284 EMERGENCY DEPT VISIT MOD MDM: CPT | Mod: 25

## 2024-09-04 PROCEDURE — 36415 COLL VENOUS BLD VENIPUNCTURE: CPT

## 2024-09-04 PROCEDURE — 72125 CT NECK SPINE W/O DYE: CPT | Mod: MC

## 2024-09-04 PROCEDURE — 85025 COMPLETE CBC W/AUTO DIFF WBC: CPT

## 2024-09-04 PROCEDURE — 80048 BASIC METABOLIC PNL TOTAL CA: CPT

## 2024-09-04 PROCEDURE — 87086 URINE CULTURE/COLONY COUNT: CPT

## 2024-09-04 PROCEDURE — 70450 CT HEAD/BRAIN W/O DYE: CPT | Mod: 26,MC

## 2024-09-04 PROCEDURE — 70486 CT MAXILLOFACIAL W/O DYE: CPT | Mod: MC

## 2024-09-04 PROCEDURE — 81001 URINALYSIS AUTO W/SCOPE: CPT

## 2024-09-04 PROCEDURE — 73130 X-RAY EXAM OF HAND: CPT

## 2024-09-04 NOTE — ED PROVIDER NOTE - CLINICAL SUMMARY MEDICAL DECISION MAKING FREE TEXT BOX
79M hx HLD, seizures presents following several falls yesterday. CT and x-ray imaging to eval for acute traumatic fx vs contusion. No laceration to repair today. Basic bloodwork and urine, eval for UTI vs electrolyte derangement as etiology of generalized weakness. Pt not interested in considering BIANCA, ambulating with cane in ED steadily.

## 2024-09-04 NOTE — ED PROVIDER NOTE - CARE PROVIDER_API CALL
Castillo Alejo  Plastic Surgery  [address] [place] [id number]  Phone: [phone]  Fax: [phone]  Follow Up Time: 7-10 Days

## 2024-09-04 NOTE — ED PROVIDER NOTE - ATTENDING CONTRIBUTION TO CARE
I have personally performed a history and physical examination of the patient and discussed management with the resident as well as the patient.  I reviewed the resident's note and agree with the documented findings and plan of care.  I have authored and modified critical sections of the Provider Note, including but not limited to HPI, Physical Exam and MDM.    79-year-old male history HLD, seizures on Vimpat presents following a fall yesterday.  Patient states he has been using a cane lately given his increasing falls.  Yesterday he was ambulating without a cane, fell twice, one time into some bushes.  Will obtain CT head for evaluation of possible gait instability.  Consider electrolyte abnormality versus occult infection.  X-rays showing left fourth distal intra-articular peripheral fracture.  Will place in finger splint and referred for hand clinic.  Offered PT evaluation for BIANCA placement.  Patient declined at this time and is requesting outpatient follow-up.  Disposition pending CT results.  Independent review of lab results shows no evidence of anemia, occult infection, or metabolic derangement.  No UTI on UA.

## 2024-09-04 NOTE — ED ADULT NURSE NOTE - NS ED NURSE LEVEL OF CONSCIOUSNESS SPEECH
Speaking Coherently follow up with your pmd in 2-3 days.  drink lots of fluids, return for left chest pain, shortness of breath, fever, rash.  tylenol for pain.      Follow pre-printed discharge instructions provided containing infromation on self-quarantine and monitoring.    Rest.  Fluids.    Tylenol 1-2 tablets every 6 hours as needed for fever.    Practice good hand hygiene.    Follow up with your primary care physician.    Please return to ER immediately for trouble breathing, shortness of breath, or any other problems or concerns arise.  Please call ahead if possible.

## 2024-09-04 NOTE — ED PROVIDER NOTE - PROGRESS NOTE DETAILS
Pt feels well. Updated pt regarding workup results demonstrating left 4th digit fracture, finger splint provided for pt to use at home. Instructed to f/u with hand specialist within 2 weeks. Return precautions discussed including recurrent fall, new/worsening sx, or concern for pt's safety, pt and son demonstrated understanding. Teddy Huang MD

## 2024-09-04 NOTE — ED ADULT NURSE NOTE - NSFALLRISKINTERV_ED_ALL_ED

## 2024-09-04 NOTE — ED ADULT TRIAGE NOTE - CHIEF COMPLAINT QUOTE
pt reports multiple falls over last day. sent in from urgent care for eval. pt reports unsteady gait  staring yesterday morning. multiple abrasions noted on face, right upper extremity swelling noted. denies LOC or blood thinners.

## 2024-09-04 NOTE — ED PROVIDER NOTE - PATIENT PORTAL LINK FT
You can access the FollowMyHealth Patient Portal offered by [facility] by registering at the following website: [email or web address]. By joining [facility]’s FollowMyHealth portal, you will also be able to view your health information using other applications (apps) compatible with our system.

## 2024-09-04 NOTE — ED ADULT NURSE REASSESSMENT NOTE - NS ED NURSE REASSESS COMMENT FT1
Assumed care of pt from DELMER Moe RN at TIME. Pt is resting comfortably in stretcher. NAD. Pt is A&Ox2 to person and place, per son this is pt baseline. Respirations are even and unlabored. Pt awaiting provider reassessment. Plan on going.

## 2024-09-04 NOTE — ED PROVIDER NOTE - OBJECTIVE STATEMENT
79-year-old male history HLD, seizures on Vimpat presents following a fall yesterday.  Patient states he has been using a cane lately given his increasing falls.  Yesterday he was ambulating without a cane, fell twice, one time into some bushes.  He went to a friend's house who is an RN who provided local wound care. Seen at urgent care today who referred pt to ED. He denies CP, SOB, lighteadedness, palpitations, vomiting, diaphoresis prior to falls. When asked of his opinion of considering BIANCA pt states he would not want to go. Presently reports bilateral hand pain and right hand swelling. Has been ambulatory since fall 79-year-old male history HLD, seizures on Vimpat presents following a fall yesterday.  Yesterday he was ambulating, fell twice, one time into some bushes.  He went to a friend's house who is an RN who provided local wound care. Seen at urgent care today who referred pt to ED. He denies CP, SOB, lighteadedness, palpitations, vomiting, diaphoresis prior to falls. When asked of his opinion of considering BIANCA pt states he would not want to go. Presently reports bilateral hand pain and right hand swelling. Has been ambulatory since fall, started using a cane as well. Tetanus UTD

## 2024-09-04 NOTE — ED PROVIDER NOTE - PHYSICAL EXAMINATION
General: Awake, alert, lying in bed in NAD  HEENT: Scattered abrasions to face and scalp without active bleeding. No scleral icterus or conjunctival injection. EOMI. Moist mucous membranes. Oropharynx clear.   Neck:. Soft and supple.  Cardiac: RRR, Peripheral pulses 2+ and symmetric. No LE edema.  Resp: Lungs CTAB. No accessory muscle use  Abd: Soft, non-tender, non-distended. No guarding, rebound, or rigidity.  Back: Spine midline and non-tender.   MSK: Swelling to right hand. Ecchymosis to left 2nd digit. No chest wall tenderness. Pelvis stable. Extremities full ROM without pain, soft compartments  Skin: Abrasions per above  Neuro: AO x 4. Moves all extremities symmetrically. Motor strength and sensation grossly intact. Ambulating steadily with cane  Psych: Appropriate mood and affect General: Awake, alert, lying in bed in NAD  HEENT: Scattered abrasions to face and scalp without active bleeding. No scleral icterus or conjunctival injection. EOMI. Moist mucous membranes. Oropharynx clear.   Neck:. Soft and supple.  Cardiac: RRR, Peripheral pulses 2+ and symmetric. No LE edema.  Resp: Lungs CTAB. No accessory muscle use  Abd: Soft, non-tender, non-distended. No guarding, rebound, or rigidity.  Back: Spine midline and non-tender.   MSK: Swelling to right hand. Ecchymosis to distal left 4nd digit. No chest wall tenderness. Pelvis stable. Extremities full ROM without pain, soft compartments  Skin: Abrasions per above  Neuro: AO x 4. Moves all extremities symmetrically. Motor strength and sensation grossly intact. Ambulating steadily with cane  Psych: Appropriate mood and affect

## 2024-09-04 NOTE — ED ADULT NURSE NOTE - OBJECTIVE STATEMENT
Assumed pt care at 1615.  Pt reports he "became wobbly" and fell on his lawn between 1000 and 1100 yesterday morning.  He was able to get himself up and start walking, he then became off balance and fell into a bush.  He denies blood thinners and denies LOC. Multiple abrasions to right side of face and right ear.  Swollen and ecchymotic right hand. Pt c/o right shoulder pain.  Abrasions present to right shoulder and right clavicle.  Pt has history of dyslexia, cannot read, and has a speech impediment.  Per Son at bedside.  He is at baseline mental status and has been forgetful lately.  He is alert to person, place, time and situation but cannot remember his age.

## 2024-09-06 LAB
CULTURE RESULTS: SIGNIFICANT CHANGE UP
SPECIMEN SOURCE: SIGNIFICANT CHANGE UP

## 2024-09-26 ENCOUNTER — APPOINTMENT (OUTPATIENT)
Dept: NEUROLOGY | Facility: CLINIC | Age: 79
End: 2024-09-26
Payer: MEDICARE

## 2024-09-26 VITALS
HEART RATE: 60 BPM | WEIGHT: 165 LBS | HEIGHT: 73 IN | BODY MASS INDEX: 21.87 KG/M2 | SYSTOLIC BLOOD PRESSURE: 125 MMHG | DIASTOLIC BLOOD PRESSURE: 73 MMHG

## 2024-09-26 DIAGNOSIS — Z00.00 ENCOUNTER FOR GENERAL ADULT MEDICAL EXAMINATION W/OUT ABNORMAL FINDINGS: ICD-10-CM

## 2024-09-26 DIAGNOSIS — Z51.81 ENCOUNTER FOR THERAPEUTIC DRUG LVL MONITORING: ICD-10-CM
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
## 2024-09-26 DIAGNOSIS — G40.109 LOCALIZATION-RELATED (FOCAL) (PARTIAL) SYMPTOMATIC EPILEPSY AND EPILEPTIC SYNDROMES WITH SIMPLE PARTIAL SEIZURES, NOT INTRACTABLE, W/OUT STATUS EPILEPTICUS: ICD-10-CM

## 2024-09-26 PROCEDURE — 99214 OFFICE O/P EST MOD 30 MIN: CPT

## 2024-09-26 PROCEDURE — 99204 OFFICE O/P NEW MOD 45 MIN: CPT

## 2024-09-26 PROCEDURE — G2211 COMPLEX E/M VISIT ADD ON: CPT

## 2024-09-27 NOTE — HISTORY OF PRESENT ILLNESS
[FreeTextEntry1] : Current meds: Divalproex  mg bid well tolerated,  mg bid  well tolerated,  ASA 81  Lisinopril 10 mg  Atorvastatin   Prev tried  LEV  ***09/26/2024*** MICHAEL KRAUSE 80 yo PMH HTN, HLD here with daughter, and girlfriend c/o falls. Prev seen May 2024 by Gloria Archuleta NP.  His last seizures occurred in June 2023. on VPA  mg bid and  mg bid well tolerated, no recent levels.  2 weeks ago, had a fall fell into bushes next to his house. forward on his hands, (fracture on X ray) went to ER [place] CT brain negative. He is off balance, using cane now, As per daughter overall slower, change of the handwriitg,  shuffling, as per patient he is more cautious, denies tremors, denies urinary incontinence.  Undergoes work up for weigh loss (16 lbs in 6 months) with PCP, colonoscopy pending. Have never seen cardiologist.  He is driving.   ***:5/24/2024*** w/ Gloria Oden BP Mr Michael Krause is here today for a scheduled folow up office visit and is accompanied by his girlfriend. He is doing well with no reported interval seizures  ***1/19/2024*** Mr Michael Krause is here today for a scheduled follow up office visit and is accompanied  by his wife. He is doing well with no reported interval seizures since June 2023 He received a letter from [facility] that his license will be suspended uless [facility] forms are filled out by 1/25/24. Divalproex  mg bid  *** 11/17/2023  *** Mr. MICHAEL KRAUSE returns for scheduled follow-up appointment. Mr. KRAUSE reports that in the interval since his last visit, he is doing well. No interval seizures. Wants to send in DMV paperwork to resume driving.  *** 06/21/2023  *** Recently hospitalized at [facility] for TIA vs seizure, MRI negative, staring with guttural sounds followed by transient R HP, know L hem FT spikes from prior, makes seizure likely etiology. Started on lacosamide 100 q12, continued on divalproex 500 q12 (not ER).  Now back to baseline. No side effects.  *** 12/09/2022  ***  Mr. Krause returns for scheduled follow-up.  He has not had any interval seizures since his last appointment.  His last seizures occurred in April 2022. The seizures occurred when he was taking a low dose of divalproex-250 twice a day.  He is currently taking 500 mg twice a day.  He would like to resume driving.  He has not been driving.  He is also limiting alcohol consumption to no more than 2 drinks per day.  *** 07/29/2022  *** Currently taking divalproex 250 mg 2 tabs. he was instructed to increase VPA to 2 tabs from 1 tab by Dr. Archuleta after his last breakthrough seizure. He remains seizure free since his last seizure cluster dated 4/2022. Planning to see Dr. Lima for paroxysmal left frontal headache ongoing for the last 2 years, underwent MRI last year reportedly unremarkable.  He is currently inquiring about driving restrictions, we emphasized that it is unsafe for him to drive and as per state law should not allowed to drive for 12 months.   ***4/18/2022*** Mr Michael Krause is here today for a scheduled visit after recent [facility] ER admission. He is accompanied by his girlfriend. He reports having a migraine 4/9 on 4/10 during the night he was noted to have a generalized tonic clonic seizure with tongue bite and urinary incontinence and was confused postictally. He is doing fine now with no reported seizures since admission. He seemed to be unclear of correct dose of medications  On discharge from [facility] the following AED regimen:  Depakote 250 mg BID Keppra 250mg BID Keppra 500 mg BID  ***UPDATE:9/3/2021*** Mr MICHAEL KRAUSE is here today for a scheduled follow up office visit an is accompanied by his wife. He is doing well with no reported interval seizures. He would like [facility] forms completed  *** 03/03/2021 *** Mr. Krause returns for scheduled follow-up. He has not had any seizures in the interval. He is very motivated to resume driving. I have indicated in the past that I will send the form to [facility] at his 6-month seizure-free anniversary. Mr. Krause notes that he is having erectile dysfunction, and asked whether this could be a side effect of divalproex. He had labs drawn in December. Depakote level was 57, chemistry was normal, CBC was significant for platelets of 145.  *** 12/02/2020 *** Mr. MICHAEL KRAUSE returns for scheduled follow-up appointment. Mr. KRAUSE reports that in the interval since his last visit, he is doing well. No interval seizures. He endorses that he inermittently experiences prurities, but not continuously. No rash. Mr. KRAUSE continues taking divalproex 250 q12 and has not had any other side effects.   *** 11/02/2020 ***  Following discharge, Mr. KRAUSE c/o pruritis and irritability on levetiracetam. He started divalproex 250 q12 several days ago. Still feels anxious and pruritis, but improved over last week. Mr. KRAUSE is upset at not being able to drive.   *** from DC Summary 10/26/2020 ***  75y L-handed man with a notable PMH of HLD, HTN, HSV1 with cold sores, h/o  environmental toxin exposure (prolonged tobacco in childhood, ground zero  during 911 with inhaled debris) and recent COVID-19 infection who presented to  [facility] ED on 10/22/20 with sudden episode of agonal breathing followed by AMS and  prolonged confused state. PT was sitting at home on the couch and wife reports  PT had sudden gasping with several agonal breaths followed by 2-3 min episode  of unresponsiveness a/w anterior-lateral tongue bite and prolonged post-event  confusion. No associated urinary or fecal incontinence, convulsions or tonic  stiffening. PT was taken to [facility] ED and in ED had second witnessed similar  episode a/w tachycardia to 160s and SpO2 down to 90%. In contrast to first  episode, during second witnessed ED event, PT's eyes were open and staring  directly forward into distance with reported post-event confusion of several  minutes.   PT does not recall events well and endorses patchy memory of previous day. In  addition, PT reports severe bi-frontal HA a/w photophobia. PT has never had  any history of seizures and denies any warning of first or second event. No  h/o head trauma, etoh or drug abuse, medication changes, stroke or any other  prior neurologic issue. Per girlfriend, PT had been c/o frontal headaches on  nearly a daily basis over past month which he described as throbbing and on  occasion a/w photophobia. He denied any HAs ever waking him, worsening with  position or valsalva maneuvers or prior history of headaches. In addition, he  had recently had more frequent cold sores, for which he was intermittently  taking oral valacycline. Denies any recent fevers, sweats, chills but did have  some weight loss.   Semiology/Description of event: Sudden gasps/agonal breathing followed by  unresponsive episode, tongue bite and post-event confusion.   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    Patient obtained the following studies:   CT Head No Cont (10.23.20 @ 00:28)   IMPRESSION:  NONCONTRAST HEAD CT SCAN: No CT evidence of acute intracranial hemorrhage, mass  effect or acute territorial infarct.   CT ANGIOGRAPHY NECK:  1. Patent cervical vasculature. No hemodynamically significant carotid  stenosis or flow-limiting vertebral artery stenosis. No evidence of  dissection.  2. Hyperenhancing soft tissue located between the right jugular bulb and skull  base segment of the right internal carotid artery with appears to demonstrate  subtle erosion through the floor of the right middle ear cavity. 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 Follow-up MRI with  contrast is recommended for further evaluation.  3. Borderline aneurysmal dilatation of the distal aortic arch/proximal  descending thoracic aorta, measuring approximately 3.9 cm in caliber.  4. An approximately 6 mm conical outpouching arising from the distal aortic  arch gives rise to a small branch vessels; this mayrepresent a congenital  aortic diverticulum.   CT ANGIOGRAPHY BRAIN: No vessel occlusion, flow-limiting stenosis or aneurysm  is identified about the [place].    MR Brain-Seizure, Epilepsy w/wo IV Cont (10.24.20 @ 21:07)  Comparison is made with the prior CT of 10/23/2020.  Ventricular and sulcal prominence is consistent with age-appropriate  involutional change. Small vessel white matter ischemic changes are noted. No  acute infarcts are seen. There is no new hemorrhage. After contrast  administration there is normal intracranial vascular enhancement. No abnormal  parenchymal or leptomeningeal enhancement is identified.  No abnormal signal in the temporal lobes is identified.  The sellar and parasellar structures are unremarkable.  Impression: Age-appropriate involutional and ischemic gliotic changes. No acute  infarcts, hemorrhage or mass. No abnormal enhancement.     MR Neck Soft Tissue Only w/wo IV Cont (10.24.20 @ 21:06)  Comparison is made with the prior CTA 10/23/2020 and the MRI of the brain  obtained concurrently.  There is no mass in the region of the right jugular bulb. The apparent mass  seen on the CTA likely was due to incomplete opacification of the jugular veins  on the arterial phase of the CT angiogram. Normal jugular venous flow is  identified.  Soft tissues of theneck are within normal limits. The salivary glands appear  normal. There are no masses. The airway is unremarkable.  After contrast administration there is normal vascular enhancement.    EEG on 10/23/20  EEG Summary:  Abnormal EEG in the awake, drowsy and asleep states.  - Two subtle left hemispheric electrographic seizures with unclear localization  at onset evolving over the temporal region  - Mild diffuse slowing  Impression/Clinical Correlate:  Two subtle left hemispheric electrographic seizures with unclear localization  at onset evolving over the temporal region. Additional findings indicate  non-specific mild diffuse or multifocal cerebral dysfunction.   EEG on 10/24/20  EEG Summary:  Abnormal EEG in the awake, drowsy and asleep states.  - left temporal sharp waves  - Mild diffuse slowing  Impression/Clinical Correlate:  There is evidence in current recording of left temporal cortical irritability  No further seizures seen in current recording. Two subtle left hemispheric  electrographic seizures with unclear localization at onset evolving over the  temporal region were seen and reported 10/23. Additional findings indicate  non-specific mild diffuse or multifocal cerebral dysfunction.   EEG on 10/25/20  EEG Summary:  Abnormal EEG in the awake, drowsy and asleep states.  - left temporal sharp waves  Impression/Clinical Correlate:  There is evidence in current recording of left temporal cortical irritability  No further seizures seen in current recording. Two subtle left hemispheric  electrographic seizures with unclear localization at onset evolving over the  temporal region were seen and reported 10/23.    Patient obtained a spinal tap on the morning of 10/23/20 with preliminary  results unremarkable and pending labs to be followed up in the outpatient  setting with Dr. Mariano.  Concern for glomus jugulotympanicum paraganlgioma was relieved with MRI of Neck  as no such finding was appreciated. Vascular Surgery confirming that initial  findings were questionable and likely non-actionable. Patient to follow up in  outpatient setting regarding finding of stable thoracic aortic aneurysm. (Dr. 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 Patient to be treated with  500mg of Keppra bid po. Patient informed of [facility] law regarding prohibition of  driving for the next 12 months. Patient discharged to home in a stable  condition on 10/25/20

## 2024-09-27 NOTE — HISTORY OF PRESENT ILLNESS
[FreeTextEntry1] : Current meds: Divalproex  mg bid well tolerated,  mg bid  well tolerated,  ASA 81  Lisinopril 10 mg  Atorvastatin   Prev tried  LEV  ***09/26/2024*** MICHAEL KRAUSE 78 yo PMH HTN, HLD here with daughter, and girlfriend c/o falls. Prev seen May 2024 by Gloria Archuleta NP.  His last seizures occurred in June 2023. on VPA  mg bid and  mg bid well tolerated, no recent levels.  2 weeks ago, had a fall fell into bushes next to his house. forward on his hands, (fracture on X ray) went to ER [place] CT brain negative. He is off balance, using cane now, As per daughter overall slower, change of the handwriitg,  shuffling, as per patient he is more cautious, denies tremors, denies urinary incontinence.  Undergoes work up for weigh loss (16 lbs in 6 months) with PCP, colonoscopy pending. Have never seen cardiologist.  He is driving.   ***:5/24/2024*** w/ Gloria Oden BP Mr Michael Krause is here today for a scheduled folow up office visit and is accompanied by his girlfriend. He is doing well with no reported interval seizures  ***1/19/2024*** Mr Michael Krause is here today for a scheduled follow up office visit and is accompanied  by his wife. He is doing well with no reported interval seizures since June 2023 He received a letter from [facility] that his license will be suspended uless [facility] forms are filled out by 1/25/24. Divalproex  mg bid  *** 11/17/2023  *** Mr. MICHAEL KRAUSE returns for scheduled follow-up appointment. Mr. KRAUSE reports that in the interval since his last visit, he is doing well. No interval seizures. Wants to send in DMV paperwork to resume driving.  *** 06/21/2023  *** Recently hospitalized at [facility] for TIA vs seizure, MRI negative, staring with guttural sounds followed by transient R HP, know L hem FT spikes from prior, makes seizure likely etiology. Started on lacosamide 100 q12, continued on divalproex 500 q12 (not ER).  Now back to baseline. No side effects.  *** 12/09/2022  ***  Mr. Krause returns for scheduled follow-up.  He has not had any interval seizures since his last appointment.  His last seizures occurred in April 2022. The seizures occurred when he was taking a low dose of divalproex-250 twice a day.  He is currently taking 500 mg twice a day.  He would like to resume driving.  He has not been driving.  He is also limiting alcohol consumption to no more than 2 drinks per day.  *** 07/29/2022  *** Currently taking divalproex 250 mg 2 tabs. he was instructed to increase VPA to 2 tabs from 1 tab by Dr. Archuleta after his last breakthrough seizure. He remains seizure free since his last seizure cluster dated 4/2022. Planning to see Dr. Lima for paroxysmal left frontal headache ongoing for the last 2 years, underwent MRI last year reportedly unremarkable.  He is currently inquiring about driving restrictions, we emphasized that it is unsafe for him to drive and as per state law should not allowed to drive for 12 months.   ***4/18/2022*** Mr Michael Krause is here today for a scheduled visit after recent [facility] ER admission. He is accompanied by his girlfriend. He reports having a migraine 4/9 on 4/10 during the night he was noted to have a generalized tonic clonic seizure with tongue bite and urinary incontinence and was confused postictally. He is doing fine now with no reported seizures since admission. He seemed to be unclear of correct dose of medications  On discharge from [facility] the following AED regimen:  Depakote 250 mg BID Keppra 250mg BID Keppra 500 mg BID  ***UPDATE:9/3/2021*** Mr MICHAEL KRAUSE is here today for a scheduled follow up office visit an is accompanied by his wife. He is doing well with no reported interval seizures. He would like [facility] forms completed  *** 03/03/2021 *** Mr. Krause returns for scheduled follow-up. He has not had any seizures in the interval. He is very motivated to resume driving. I have indicated in the past that I will send the form to [facility] at his 6-month seizure-free anniversary. Mr. Krause notes that he is having erectile dysfunction, and asked whether this could be a side effect of divalproex. He had labs drawn in December. Depakote level was 57, chemistry was normal, CBC was significant for platelets of 145.  *** 12/02/2020 *** Mr. MICHAEL KRAUSE returns for scheduled follow-up appointment. Mr. KRAUSE reports that in the interval since his last visit, he is doing well. No interval seizures. He endorses that he inermittently experiences prurities, but not continuously. No rash. Mr. KRAUSE continues taking divalproex 250 q12 and has not had any other side effects.   *** 11/02/2020 ***  Following discharge, Mr. KRAUSE c/o pruritis and irritability on levetiracetam. He started divalproex 250 q12 several days ago. Still feels anxious and pruritis, but improved over last week. Mr. KRAUSE is upset at not being able to drive.   *** from DC Summary 10/26/2020 ***  75y L-handed man with a notable PMH of HLD, HTN, HSV1 with cold sores, h/o  environmental toxin exposure (prolonged tobacco in childhood, ground zero  during 911 with inhaled debris) and recent COVID-19 infection who presented to  [facility] ED on 10/22/20 with sudden episode of agonal breathing followed by AMS and  prolonged confused state. PT was sitting at home on the couch and wife reports  PT had sudden gasping with several agonal breaths followed by 2-3 min episode  of unresponsiveness a/w anterior-lateral tongue bite and prolonged post-event  confusion. No associated urinary or fecal incontinence, convulsions or tonic  stiffening. PT was taken to [facility] ED and in ED had second witnessed similar  episode a/w tachycardia to 160s and SpO2 down to 90%. In contrast to first  episode, during second witnessed ED event, PT's eyes were open and staring  directly forward into distance with reported post-event confusion of several  minutes.   PT does not recall events well and endorses patchy memory of previous day. In  addition, PT reports severe bi-frontal HA a/w photophobia. PT has never had  any history of seizures and denies any warning of first or second event. No  h/o head trauma, etoh or drug abuse, medication changes, stroke or any other  prior neurologic issue. Per girlfriend, PT had been c/o frontal headaches on  nearly a daily basis over past month which he described as throbbing and on  occasion a/w photophobia. He denied any HAs ever waking him, worsening with  position or valsalva maneuvers or prior history of headaches. In addition, he  had recently had more frequent cold sores, for which he was intermittently  taking oral valacycline. Denies any recent fevers, sweats, chills but did have  some weight loss.   Semiology/Description of event: Sudden gasps/agonal breathing followed by  unresponsive episode, tongue bite and post-event confusion.   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    Patient obtained the following studies:   CT Head No Cont (10.23.20 @ 00:28)   IMPRESSION:  NONCONTRAST HEAD CT SCAN: No CT evidence of acute intracranial hemorrhage, mass  effect or acute territorial infarct.   CT ANGIOGRAPHY NECK:  1. Patent cervical vasculature. No hemodynamically significant carotid  stenosis or flow-limiting vertebral artery stenosis. No evidence of  dissection.  2. Hyperenhancing soft tissue located between the right jugular bulb and skull  base segment of the right internal carotid artery with appears to demonstrate  subtle erosion through the floor of the right middle ear cavity. 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 Follow-up MRI with  contrast is recommended for further evaluation.  3. Borderline aneurysmal dilatation of the distal aortic arch/proximal  descending thoracic aorta, measuring approximately 3.9 cm in caliber.  4. An approximately 6 mm conical outpouching arising from the distal aortic  arch gives rise to a small branch vessels; this mayrepresent a congenital  aortic diverticulum.   CT ANGIOGRAPHY BRAIN: No vessel occlusion, flow-limiting stenosis or aneurysm  is identified about the [place].    MR Brain-Seizure, Epilepsy w/wo IV Cont (10.24.20 @ 21:07)  Comparison is made with the prior CT of 10/23/2020.  Ventricular and sulcal prominence is consistent with age-appropriate  involutional change. Small vessel white matter ischemic changes are noted. No  acute infarcts are seen. There is no new hemorrhage. After contrast  administration there is normal intracranial vascular enhancement. No abnormal  parenchymal or leptomeningeal enhancement is identified.  No abnormal signal in the temporal lobes is identified.  The sellar and parasellar structures are unremarkable.  Impression: Age-appropriate involutional and ischemic gliotic changes. No acute  infarcts, hemorrhage or mass. No abnormal enhancement.     MR Neck Soft Tissue Only w/wo IV Cont (10.24.20 @ 21:06)  Comparison is made with the prior CTA 10/23/2020 and the MRI of the brain  obtained concurrently.  There is no mass in the region of the right jugular bulb. The apparent mass  seen on the CTA likely was due to incomplete opacification of the jugular veins  on the arterial phase of the CT angiogram. Normal jugular venous flow is  identified.  Soft tissues of theneck are within normal limits. The salivary glands appear  normal. There are no masses. The airway is unremarkable.  After contrast administration there is normal vascular enhancement.    EEG on 10/23/20  EEG Summary:  Abnormal EEG in the awake, drowsy and asleep states.  - Two subtle left hemispheric electrographic seizures with unclear localization  at onset evolving over the temporal region  - Mild diffuse slowing  Impression/Clinical Correlate:  Two subtle left hemispheric electrographic seizures with unclear localization  at onset evolving over the temporal region. Additional findings indicate  non-specific mild diffuse or multifocal cerebral dysfunction.   EEG on 10/24/20  EEG Summary:  Abnormal EEG in the awake, drowsy and asleep states.  - left temporal sharp waves  - Mild diffuse slowing  Impression/Clinical Correlate:  There is evidence in current recording of left temporal cortical irritability  No further seizures seen in current recording. Two subtle left hemispheric  electrographic seizures with unclear localization at onset evolving over the  temporal region were seen and reported 10/23. Additional findings indicate  non-specific mild diffuse or multifocal cerebral dysfunction.   EEG on 10/25/20  EEG Summary:  Abnormal EEG in the awake, drowsy and asleep states.  - left temporal sharp waves  Impression/Clinical Correlate:  There is evidence in current recording of left temporal cortical irritability  No further seizures seen in current recording. Two subtle left hemispheric  electrographic seizures with unclear localization at onset evolving over the  temporal region were seen and reported 10/23.    Patient obtained a spinal tap on the morning of 10/23/20 with preliminary  results unremarkable and pending labs to be followed up in the outpatient  setting with Dr. Mariano.  Concern for glomus jugulotympanicum paraganlgioma was relieved with MRI of Neck  as no such finding was appreciated. Vascular Surgery confirming that initial  findings were questionable and likely non-actionable. Patient to follow up in  outpatient setting regarding finding of stable thoracic aortic aneurysm. (Dr. 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 Patient to be treated with  500mg of Keppra bid po. Patient informed of [facility] law regarding prohibition of  driving for the next 12 months. Patient discharged to home in a stable  condition on 10/25/20

## 2024-09-27 NOTE — DATA REVIEWED
[de-identified] : 09/20/2024  CT HEAD: No acute intracranial hemorrhage, mass effect, or osseous fracture. CT MAXILLOFACIAL BONES: No acute maxillofacial bone or mandibular fracture. CT CERVICAL SPINE: No acute cervical spine fracture or traumatic malalignment.

## 2024-09-27 NOTE — ASSESSMENT
[FreeTextEntry1] : MICHAEL RODRIGUEZ 78 yo PMH HTN, HLD, seizure, Last seizure in June (?May) 2023.  No interval episodes, on VPA ER  500 mg bid and  mg bid  Few falls cardiogenic vs mechanical.   Plan  Seizures  - Continue Divalproex  bid and Lacosamide 150 mg bid.  - Ordered levels trough also cbc LFT and cmp. - ordered 48hr aEEG - MR w w/o gado seizure protocol  - reviewed seizure triggers  Falls etiology cardiogenic vs mechanical  - Referral placed to cardiology (HR 60 todays visit), pt never seen cardiologist before - Advised to hydrate extra  - Referral placed to movement disorder, slower over all,, tremors and h/o multiple falls - will order PT eval after diag work up completed.  - pt was advised not to drive until diagnostic work up completed  - Follow up with Dr Mariano in 4-6 months call if any breakthrough seizures  x 45 min
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
## 2024-09-27 NOTE — DATA REVIEWED
[de-identified] : 09/20/2024  CT HEAD: No acute intracranial hemorrhage, mass effect, or osseous fracture. CT MAXILLOFACIAL BONES: No acute maxillofacial bone or mandibular fracture. CT CERVICAL SPINE: No acute cervical spine fracture or traumatic malalignment.

## 2024-09-27 NOTE — DATA REVIEWED
[de-identified] : 09/20/2024  CT HEAD: No acute intracranial hemorrhage, mass effect, or osseous fracture. CT MAXILLOFACIAL BONES: No acute maxillofacial bone or mandibular fracture. CT CERVICAL SPINE: No acute cervical spine fracture or traumatic malalignment.

## 2024-09-27 NOTE — DATA REVIEWED
[de-identified] : 09/20/2024  CT HEAD: No acute intracranial hemorrhage, mass effect, or osseous fracture. CT MAXILLOFACIAL BONES: No acute maxillofacial bone or mandibular fracture. CT CERVICAL SPINE: No acute cervical spine fracture or traumatic malalignment.
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
## 2024-09-27 NOTE — ASSESSMENT
[FreeTextEntry1] : MICHAEL RODRIGUEZ 80 yo PMH HTN, HLD, seizure, Last seizure in June (?May) 2023.  No interval episodes, on VPA ER  500 mg bid and  mg bid  Few falls cardiogenic vs mechanical.   Plan  Seizures  - Continue Divalproex  bid and Lacosamide 150 mg bid.  - Ordered levels trough also cbc LFT and cmp. - ordered 48hr aEEG - MR w w/o gado seizure protocol  - reviewed seizure triggers  Falls etiology cardiogenic vs mechanical  - Referral placed to cardiology (HR 60 todays visit), pt never seen cardiologist before - Advised to hydrate extra  - Referral placed to movement disorder, slower over all,, tremors and h/o multiple falls - will order PT eval after diag work up completed.  - pt was advised not to drive until diagnostic work up completed  - Follow up with Dr Mariano in 4-6 months call if any breakthrough seizures  x 45 min

## 2024-09-27 NOTE — PHYSICAL EXAM
[FreeTextEntry1] : Alert and oriented x 3, speech fluent, names easily, follows requests, good recall for recent and remote events. EOM full without sustained nystagmus, PERRL, face symmetrical, stuttering as per daughter at baseline Motor - symmetric strength. normal rapid-alternating movements. no cogwhilling  Sensory - intact LT bilaterally Coord - fine tremor in L hand Gait - stands with difficulty, slow gait no shaffling noted, ambulated with cane

## 2024-09-27 NOTE — REVIEW OF SYSTEMS
[As Noted in HPI] : as noted in HPI [Seizures] : convulsions [Negative] : Psychiatric [Confused or Disoriented] : no confusion [Memory Lapses or Loss] : no memory loss [Arm Weakness] : no arm weakness [Hand Weakness] : no hand weakness [Leg Weakness] : no leg weakness [Difficulties in Speech] : no speech difficulties [Limping] : not limping

## 2024-10-01 ENCOUNTER — LABORATORY RESULT (OUTPATIENT)
Age: 79
End: 2024-10-01

## 2024-10-01 LAB
ALBUMIN SERPL ELPH-MCNC: 4.2 G/DL
ALP BLD-CCNC: 40 U/L
ALT SERPL-CCNC: 12 U/L
ANION GAP SERPL CALC-SCNC: 12 MMOL/L
AST SERPL-CCNC: 24 U/L
BILIRUB SERPL-MCNC: 0.5 MG/DL
BUN SERPL-MCNC: 16 MG/DL
CALCIUM SERPL-MCNC: 9.4 MG/DL
CHLORIDE SERPL-SCNC: 100 MMOL/L
CO2 SERPL-SCNC: 25 MMOL/L
CREAT SERPL-MCNC: 0.93 MG/DL
EGFR: 84 ML/MIN/1.73M2
GLUCOSE SERPL-MCNC: 82 MG/DL
POTASSIUM SERPL-SCNC: 4.4 MMOL/L
PROT SERPL-MCNC: 6.7 G/DL
SODIUM SERPL-SCNC: 137 MMOL/L

## 2024-10-02 LAB
HCT VFR BLD CALC: 38.9 %
HGB BLD-MCNC: 12.1 G/DL
MCHC RBC-ENTMCNC: 30.7 PG
MCHC RBC-ENTMCNC: 31.1 GM/DL
MCV RBC AUTO: 98.7 FL
PLATELET # BLD AUTO: 93 K/UL
RBC # BLD: 3.94 M/UL
RBC # FLD: 14.3 %
VALPROATE SERPL-MCNC: 94 UG/ML
WBC # FLD AUTO: 4.39 K/UL

## 2024-10-03 LAB — LACOSAMIDE (VIMPAT): 5.45 UG/ML

## 2024-11-14 ENCOUNTER — APPOINTMENT (OUTPATIENT)
Dept: CARDIOLOGY | Facility: CLINIC | Age: 79
End: 2024-11-14

## 2024-12-03 ENCOUNTER — APPOINTMENT (OUTPATIENT)
Dept: NEUROLOGY | Facility: CLINIC | Age: 79
End: 2024-12-03

## 2025-01-24 ENCOUNTER — APPOINTMENT (OUTPATIENT)
Dept: NEUROLOGY | Facility: CLINIC | Age: 80
End: 2025-01-24

## 2025-07-21 NOTE — PATIENT PROFILE ADULT - MONEY FOR FOOD
Caller: ISRAEL RYAN    Relationship: SELF    Best call back number:   Telephone Information:   Mobile [phone]         Requested Prescriptions:   Requested Prescriptions     Pending Prescriptions Disp Refills    HYDROcodone-acetaminophen (NORCO)  MG per tablet 135 tablet 0     Sig: Take 1.5 tablets by mouth Every 4 (Four) Hours As Needed for Moderate Pain.        Pharmacy where request should be sent: [id number] - [place], [place] - [address] [facility] OF [address] [phone] [facility] [phone] FX     Last office visit with prescribing clinician: 7/3/2025   Last telemedicine visit with prescribing clinician: Visit date not found   Next office visit with prescribing clinician: 8/1/2025         Does the patient have less than a 3 day supply:  [x] Yes  [] No    Would you like a call back once the refill request has been completed: [] Yes [x] No    If the office needs to give you a call back, can they leave a voicemail: [] Yes [x] No    
no